# Patient Record
Sex: MALE | Race: WHITE | NOT HISPANIC OR LATINO | Employment: FULL TIME | URBAN - NONMETROPOLITAN AREA
[De-identification: names, ages, dates, MRNs, and addresses within clinical notes are randomized per-mention and may not be internally consistent; named-entity substitution may affect disease eponyms.]

---

## 2017-06-26 ENCOUNTER — OFFICE VISIT (OUTPATIENT)
Dept: FAMILY MEDICINE CLINIC | Facility: CLINIC | Age: 57
End: 2017-06-26

## 2017-06-26 VITALS
HEART RATE: 77 BPM | SYSTOLIC BLOOD PRESSURE: 122 MMHG | WEIGHT: 284 LBS | OXYGEN SATURATION: 98 % | BODY MASS INDEX: 38.47 KG/M2 | HEIGHT: 72 IN | DIASTOLIC BLOOD PRESSURE: 70 MMHG | TEMPERATURE: 98.2 F

## 2017-06-26 DIAGNOSIS — I10 ESSENTIAL HYPERTENSION: ICD-10-CM

## 2017-06-26 DIAGNOSIS — L03.031 CELLULITIS OF TOE, RIGHT: Primary | ICD-10-CM

## 2017-06-26 PROCEDURE — 99213 OFFICE O/P EST LOW 20 MIN: CPT | Performed by: NURSE PRACTITIONER

## 2017-06-26 RX ORDER — LOSARTAN POTASSIUM AND HYDROCHLOROTHIAZIDE 12.5; 1 MG/1; MG/1
1 TABLET ORAL DAILY
Qty: 30 TABLET | Refills: 5 | Status: SHIPPED | OUTPATIENT
Start: 2017-06-26 | End: 2017-09-13 | Stop reason: SDUPTHER

## 2017-06-26 RX ORDER — CEPHALEXIN 500 MG/1
500 CAPSULE ORAL 2 TIMES DAILY
Qty: 10 CAPSULE | Refills: 0 | Status: SHIPPED | OUTPATIENT
Start: 2017-06-26 | End: 2017-09-13

## 2017-06-26 RX ORDER — BIOTIN 1 MG
1000 TABLET ORAL DAILY
COMMUNITY
End: 2017-09-13

## 2017-06-26 RX ORDER — PREDNISONE 10 MG/1
10 TABLET ORAL DAILY
Qty: 7 TABLET | Refills: 0 | Status: SHIPPED | OUTPATIENT
Start: 2017-06-26 | End: 2017-09-13

## 2017-06-26 RX ORDER — FLUTICASONE PROPIONATE 50 MCG
2 SPRAY, SUSPENSION (ML) NASAL DAILY
Qty: 1 EACH | Refills: 0 | Status: SHIPPED | OUTPATIENT
Start: 2017-06-26 | End: 2017-07-26

## 2017-06-28 PROBLEM — L03.031 CELLULITIS OF TOE, RIGHT: Status: ACTIVE | Noted: 2017-06-28

## 2017-06-28 NOTE — PATIENT INSTRUCTIONS
Continue with heart healthy diet and exercise.  Will place on abx x 5 days as prophylaxis, he may also do epsom salt soaks to his Right foot twice daily prn.

## 2017-06-28 NOTE — PROGRESS NOTES
Subjective   Cy Blair is a 57 y.o. male who presents to the office for HTN follow-up.  Also has an area on his Right great toe that he wants assessed.  Had LifeScreening tests (abd aorta, carotid and BLE ultrasounds) performed in March that he states were normal.  History of Present Illness     The following portions of the patient's history were reviewed and updated as appropriate: allergies, current medications, past family history, past medical history, past social history, past surgical history and problem list.    Review of Systems   Constitutional: Negative for chills, fatigue and fever.   HENT: Negative for congestion, sneezing, sore throat and trouble swallowing.    Eyes: Negative for visual disturbance.   Respiratory: Negative for cough, chest tightness, shortness of breath and wheezing.    Cardiovascular: Negative for chest pain, palpitations and leg swelling.   Gastrointestinal: Negative for abdominal pain, constipation, diarrhea, nausea and vomiting.   Genitourinary: Negative for dysuria, frequency and urgency.   Musculoskeletal: Negative for neck pain.   Skin: Positive for wound. Negative for rash.   Neurological: Negative for dizziness, weakness and headaches.   Psychiatric/Behavioral:        In the past two weeks the pt has not felt down, depressed, hopeless or lost interest in doing things   All other systems reviewed and are negative.      Objective   Physical Exam   Constitutional: He is oriented to person, place, and time. He appears well-developed and well-nourished. He is cooperative. He does not have a sickly appearance. He does not appear ill.   HENT:   Head: Normocephalic and atraumatic.   Right Ear: External ear normal.   Left Ear: External ear normal.   Nose: Nose normal.   Mouth/Throat: Uvula is midline, oropharynx is clear and moist and mucous membranes are normal.   Eyes: Conjunctivae, EOM and lids are normal. Pupils are equal, round, and reactive to light. Right eye exhibits no  discharge. Left eye exhibits no discharge. No scleral icterus.   Neck: Trachea normal, normal range of motion and phonation normal. Neck supple. Carotid bruit is not present. No thyromegaly present.   Cardiovascular: Normal rate, regular rhythm, normal heart sounds and intact distal pulses.  Exam reveals no gallop and no friction rub.    No murmur heard.  Pulmonary/Chest: Effort normal and breath sounds normal. No respiratory distress. He has no wheezes. He has no rales.   Abdominal: Soft. Normal appearance and bowel sounds are normal. He exhibits no distension. There is no tenderness. There is no rebound and no guarding.   Musculoskeletal: Normal range of motion. He exhibits no edema.      Skin Integrity  -  Right foot skin intact: Right great toe with cellulitis, crosses over the nail. .  Lymphadenopathy:     He has no cervical adenopathy.   Neurological: He is alert and oriented to person, place, and time. No cranial nerve deficit. GCS eye subscore is 4. GCS verbal subscore is 5. GCS motor subscore is 6.   Skin: Skin is warm, dry and intact. No rash noted.   Psychiatric: He has a normal mood and affect. His behavior is normal. Judgment and thought content normal.   Nursing note and vitals reviewed.      Assessment/Plan   Cy was seen today for follow-up.    Diagnoses and all orders for this visit:    Cellulitis of toe, right    Essential hypertension    Other orders  -     losartan-hydrochlorothiazide (HYZAAR) 100-12.5 MG per tablet; Take 1 tablet by mouth Daily.  -     predniSONE (DELTASONE) 10 MG tablet; Take 1 tablet by mouth Daily.  -     fluticasone (FLONASE) 50 MCG/ACT nasal spray; 2 sprays into each nostril Daily for 30 days.  -     cephalexin (KEFLEX) 500 MG capsule; Take 1 capsule by mouth 2 (Two) Times a Day.

## 2017-09-13 ENCOUNTER — OFFICE VISIT (OUTPATIENT)
Dept: FAMILY MEDICINE CLINIC | Facility: CLINIC | Age: 57
End: 2017-09-13

## 2017-09-13 ENCOUNTER — LAB (OUTPATIENT)
Dept: LAB | Facility: OTHER | Age: 57
End: 2017-09-13

## 2017-09-13 VITALS
WEIGHT: 286 LBS | OXYGEN SATURATION: 99 % | HEART RATE: 85 BPM | TEMPERATURE: 98.2 F | DIASTOLIC BLOOD PRESSURE: 68 MMHG | BODY MASS INDEX: 38.74 KG/M2 | SYSTOLIC BLOOD PRESSURE: 136 MMHG | HEIGHT: 72 IN

## 2017-09-13 DIAGNOSIS — K59.09 OTHER CONSTIPATION: ICD-10-CM

## 2017-09-13 DIAGNOSIS — I10 ESSENTIAL HYPERTENSION: ICD-10-CM

## 2017-09-13 DIAGNOSIS — Z00.00 GENERAL MEDICAL EXAM: ICD-10-CM

## 2017-09-13 DIAGNOSIS — L60.0 INGROWN RIGHT BIG TOENAIL: Primary | ICD-10-CM

## 2017-09-13 DIAGNOSIS — Z12.5 PROSTATE CANCER SCREENING: ICD-10-CM

## 2017-09-13 DIAGNOSIS — L60.0 INGROWN RIGHT BIG TOENAIL: ICD-10-CM

## 2017-09-13 DIAGNOSIS — Z00.00 GENERAL MEDICAL EXAM: Primary | ICD-10-CM

## 2017-09-13 DIAGNOSIS — K21.9 GASTROESOPHAGEAL REFLUX DISEASE WITHOUT ESOPHAGITIS: ICD-10-CM

## 2017-09-13 LAB
ALBUMIN SERPL-MCNC: 4.5 G/DL (ref 3.2–5.5)
ALBUMIN/GLOB SERPL: 1.5 G/DL (ref 1–3)
ALP SERPL-CCNC: 54 U/L (ref 15–121)
ALT SERPL W P-5'-P-CCNC: 21 U/L (ref 10–60)
ANION GAP SERPL CALCULATED.3IONS-SCNC: 10 MMOL/L (ref 5–15)
AST SERPL-CCNC: 20 U/L (ref 10–60)
BASOPHILS # BLD AUTO: 0.03 10*3/MM3 (ref 0–0.2)
BASOPHILS NFR BLD AUTO: 0.6 % (ref 0–2)
BILIRUB SERPL-MCNC: 1 MG/DL (ref 0.2–1)
BUN BLD-MCNC: 18 MG/DL (ref 8–25)
BUN/CREAT SERPL: 18 (ref 7–25)
CALCIUM SPEC-SCNC: 9.7 MG/DL (ref 8.4–10.8)
CHLORIDE SERPL-SCNC: 102 MMOL/L (ref 100–112)
CHOLEST SERPL-MCNC: 150 MG/DL (ref 150–200)
CO2 SERPL-SCNC: 27 MMOL/L (ref 20–32)
CREAT BLD-MCNC: 1 MG/DL (ref 0.4–1.3)
DEPRECATED RDW RBC AUTO: 40.4 FL (ref 35.1–43.9)
EOSINOPHIL # BLD AUTO: 0.14 10*3/MM3 (ref 0–0.7)
EOSINOPHIL NFR BLD AUTO: 2.9 % (ref 0–7)
ERYTHROCYTE [DISTWIDTH] IN BLOOD BY AUTOMATED COUNT: 13 % (ref 11.5–14.5)
GFR SERPL CREATININE-BSD FRML MDRD: 76 ML/MIN/1.73 (ref 56–130)
GLOBULIN UR ELPH-MCNC: 3.1 GM/DL (ref 2.5–4.6)
GLUCOSE BLD-MCNC: 109 MG/DL (ref 70–100)
HBA1C MFR BLD: 5 % (ref 4–5.6)
HCT VFR BLD AUTO: 43.6 % (ref 39–49)
HDLC SERPL-MCNC: 39 MG/DL (ref 35–100)
HGB BLD-MCNC: 15.5 G/DL (ref 13.7–17.3)
LDLC SERPL CALC-MCNC: 82 MG/DL
LDLC/HDLC SERPL: 2.1 {RATIO}
LYMPHOCYTES # BLD AUTO: 1.41 10*3/MM3 (ref 0.6–4.2)
LYMPHOCYTES NFR BLD AUTO: 28.7 % (ref 10–50)
MCH RBC QN AUTO: 31.2 PG (ref 26.5–34)
MCHC RBC AUTO-ENTMCNC: 35.6 G/DL (ref 31.5–36.3)
MCV RBC AUTO: 87.7 FL (ref 80–98)
MONOCYTES # BLD AUTO: 0.46 10*3/MM3 (ref 0–0.9)
MONOCYTES NFR BLD AUTO: 9.4 % (ref 0–12)
NEUTROPHILS # BLD AUTO: 2.87 10*3/MM3 (ref 2–8.6)
NEUTROPHILS NFR BLD AUTO: 58.4 % (ref 37–80)
PLATELET # BLD AUTO: 216 10*3/MM3 (ref 150–450)
PMV BLD AUTO: 10.9 FL (ref 8–12)
POTASSIUM BLD-SCNC: 4.5 MMOL/L (ref 3.4–5.4)
PROT SERPL-MCNC: 7.6 G/DL (ref 6.7–8.2)
RBC # BLD AUTO: 4.97 10*6/MM3 (ref 4.37–5.74)
SODIUM BLD-SCNC: 139 MMOL/L (ref 134–146)
TRIGL SERPL-MCNC: 145 MG/DL (ref 35–160)
VLDLC SERPL-MCNC: 29 MG/DL
WBC NRBC COR # BLD: 4.91 10*3/MM3 (ref 3.2–9.8)

## 2017-09-13 PROCEDURE — 80061 LIPID PANEL: CPT | Performed by: NURSE PRACTITIONER

## 2017-09-13 PROCEDURE — 84443 ASSAY THYROID STIM HORMONE: CPT | Performed by: NURSE PRACTITIONER

## 2017-09-13 PROCEDURE — 80053 COMPREHEN METABOLIC PANEL: CPT | Performed by: NURSE PRACTITIONER

## 2017-09-13 PROCEDURE — 99213 OFFICE O/P EST LOW 20 MIN: CPT | Performed by: NURSE PRACTITIONER

## 2017-09-13 PROCEDURE — 36415 COLL VENOUS BLD VENIPUNCTURE: CPT | Performed by: NURSE PRACTITIONER

## 2017-09-13 PROCEDURE — 83036 HEMOGLOBIN GLYCOSYLATED A1C: CPT | Performed by: NURSE PRACTITIONER

## 2017-09-13 PROCEDURE — 84439 ASSAY OF FREE THYROXINE: CPT | Performed by: NURSE PRACTITIONER

## 2017-09-13 PROCEDURE — 84153 ASSAY OF PSA TOTAL: CPT | Performed by: NURSE PRACTITIONER

## 2017-09-13 PROCEDURE — 85025 COMPLETE CBC W/AUTO DIFF WBC: CPT | Performed by: NURSE PRACTITIONER

## 2017-09-13 RX ORDER — LOSARTAN POTASSIUM AND HYDROCHLOROTHIAZIDE 12.5; 1 MG/1; MG/1
1 TABLET ORAL DAILY
Qty: 90 TABLET | Refills: 1 | Status: SHIPPED | OUTPATIENT
Start: 2017-09-13 | End: 2017-12-20 | Stop reason: SDUPTHER

## 2017-09-13 RX ORDER — OMEPRAZOLE 20 MG/1
20 CAPSULE, DELAYED RELEASE ORAL DAILY
COMMUNITY
End: 2019-03-18

## 2017-09-13 NOTE — PROGRESS NOTES
Subjective   Cy Blair is a 57 y.o. male who presents to the office for HTN followup, is pleased with his blood pressure reading today.  Continues to have pain and drainage with his Right great toe.  Denies streaking but does have an odor.  Will obtain fasting labs today.  History of Present Illness     The following portions of the patient's history were reviewed and updated as appropriate: allergies, current medications, past family history, past medical history, past social history, past surgical history and problem list.    Review of Systems   Constitutional: Negative for chills, fatigue and fever.   HENT: Negative for congestion, sneezing, sore throat and trouble swallowing.    Eyes: Negative for visual disturbance.   Respiratory: Negative for cough, chest tightness, shortness of breath and wheezing.    Cardiovascular: Negative for chest pain, palpitations and leg swelling.   Gastrointestinal: Negative for abdominal pain, constipation, diarrhea, nausea and vomiting.   Genitourinary: Negative for dysuria, frequency and urgency.   Musculoskeletal: Negative for neck pain.   Skin: Positive for wound. Negative for rash.   Neurological: Negative for dizziness, weakness and headaches.   Psychiatric/Behavioral:        In the past two weeks the pt has not felt down, depressed, hopeless or lost interest in doing things   All other systems reviewed and are negative.      Objective   Physical Exam   Constitutional: He is oriented to person, place, and time. He appears well-developed and well-nourished. He is cooperative. He does not have a sickly appearance. He does not appear ill.   HENT:   Head: Normocephalic and atraumatic.   Right Ear: External ear normal.   Left Ear: External ear normal.   Nose: Nose normal.   Mouth/Throat: Uvula is midline, oropharynx is clear and moist and mucous membranes are normal.   Eyes: Conjunctivae, EOM and lids are normal. Pupils are equal, round, and reactive to light. Right eye exhibits  no discharge. Left eye exhibits no discharge. No scleral icterus.   Neck: Trachea normal, normal range of motion and phonation normal. Neck supple. No thyromegaly present.   Cardiovascular: Normal rate, regular rhythm, normal heart sounds and intact distal pulses.  Exam reveals no gallop and no friction rub.    No murmur heard.  Pulmonary/Chest: Effort normal and breath sounds normal. No respiratory distress. He has no wheezes. He has no rales.   Abdominal: Soft. Normal appearance and bowel sounds are normal. He exhibits no distension. There is no tenderness. There is no rebound and no guarding.   Musculoskeletal: Normal range of motion. He exhibits no edema.      Skin Integrity  -  He has right foot ingrown toenail (swelling with bloody exudate and pain he rates a 5)..  Lymphadenopathy:     He has no cervical adenopathy.   Neurological: He is alert and oriented to person, place, and time. No cranial nerve deficit. GCS eye subscore is 4. GCS verbal subscore is 5. GCS motor subscore is 6.   Skin: Skin is warm, dry and intact. No rash noted.   Psychiatric: He has a normal mood and affect. His behavior is normal. Judgment and thought content normal.   Nursing note and vitals reviewed.      Assessment/Plan   Cy was seen today for follow-up.    Diagnoses and all orders for this visit:    Ingrown right big toenail  -     Ambulatory Referral to Podiatry  -     Hemoglobin A1c; Future    Essential hypertension  -     Hemoglobin A1c; Future    Gastroesophageal reflux disease without esophagitis    Other constipation    Prostate cancer screening    Other orders  -     losartan-hydrochlorothiazide (HYZAAR) 100-12.5 MG per tablet; Take 1 tablet by mouth Daily.

## 2017-09-14 LAB
PSA SERPL-MCNC: 0.18 NG/ML (ref 0–4)
T4 FREE SERPL-MCNC: 1.28 NG/DL (ref 0.78–2.19)
TSH SERPL DL<=0.05 MIU/L-ACNC: 1.56 MIU/ML (ref 0.46–4.68)

## 2017-10-05 ENCOUNTER — OFFICE VISIT (OUTPATIENT)
Dept: PODIATRY | Facility: CLINIC | Age: 57
End: 2017-10-05

## 2017-10-05 VITALS — BODY MASS INDEX: 38.74 KG/M2 | WEIGHT: 286 LBS | HEIGHT: 72 IN

## 2017-10-05 DIAGNOSIS — L60.0 INGROWN TOENAIL: Primary | ICD-10-CM

## 2017-10-05 DIAGNOSIS — M79.674 PAIN OF TOE OF RIGHT FOOT: ICD-10-CM

## 2017-10-05 PROCEDURE — 99203 OFFICE O/P NEW LOW 30 MIN: CPT | Performed by: PODIATRIST

## 2017-10-05 RX ORDER — RANITIDINE 150 MG/1
150 TABLET ORAL 2 TIMES DAILY
COMMUNITY
End: 2018-04-25

## 2017-10-05 NOTE — PROGRESS NOTES
Cy Blair  1960  57 y.o. male   Patient presents today for possible right hallux ingrown.     10/05/2017  Chief Complaint   Patient presents with   • Right Foot - Ingrown Toenail           History of Present Illness    Cy Blair is a 57 y.o. male who presents for evaluation of a possible ingrown toenail on his right foot.  States he does not feel that this started out as an ingrown toenail but has irritation when he wore some ill fitting work shoes.  States that progressively noticed the inside of his nail become more painful and started bleeding.  He states he appears there is a skin fold growing over the edge of his nail.  This problem has been present for a couple of months and also recently he hasa similar problem began on the opposite side nail for the past 3 weeks.  He states he has tried trimming the nail with minimal improvement.  He denies any trauma or injuries.  Denies any previous history of ingrown toenails.        Past Medical History:   Diagnosis Date   • Candidiasis of skin     Candidiasis of skin - has flareups during the summer   • Essential (primary) hypertension          Past Surgical History:   Procedure Laterality Date   • KIDNEY STONE SURGERY     • NECK SURGERY      for herniated discs and spinal stenosis         Family History   Problem Relation Age of Onset   • Heart attack Mother 81     POSSIBLE MI   • Heart disease Father    • Sudden death Other    • Hypertension Brother    • Dementia Maternal Grandmother    • No Known Problems Maternal Grandfather    • No Known Problems Paternal Grandmother    • Clotting disorder Paternal Grandfather          Social History     Social History   • Marital status: Unknown     Spouse name: N/A   • Number of children: N/A   • Years of education: N/A     Occupational History   • Not on file.     Social History Main Topics   • Smoking status: Never Smoker   • Smokeless tobacco: Never Used   • Alcohol use No   • Drug use: No   • Sexual activity: Defer  "    Other Topics Concern   • Not on file     Social History Narrative         Current Outpatient Prescriptions   Medication Sig Dispense Refill   • aspirin 81 MG chewable tablet Chew 81 mg Daily.     • diphenhydrAMINE (BENADRYL) 25 mg capsule Take 25 mg by mouth Daily As Needed.     • fluticasone (FLONASE) 50 MCG/ACT nasal spray 2 sprays into each nostril Daily.     • losartan-hydrochlorothiazide (HYZAAR) 100-12.5 MG per tablet Take 1 tablet by mouth Daily. 90 tablet 1   • omeprazole (priLOSEC) 20 MG capsule Take 20 mg by mouth Daily.     • Yktjer-IeQbx-KdBkgu-FA-Omega (MULTIVITAMIN/MINERALS PO) Med Name: multivitamin & minerals #11-folic acid oral     • raNITIdine (ZANTAC) 150 MG tablet Take 150 mg by mouth 2 (Two) Times a Day.     • senna-docusate (SENOKOT S) 8.6-50 MG per tablet 1-2 tablets Daily.       No current facility-administered medications for this visit.          OBJECTIVE    Ht 72\" (182.9 cm)  Wt 286 lb (130 kg)  BMI 38.79 kg/m2      Review of Systems   Constitutional:  Denies recent weight loss, weight gain, fever or chills, no change in exercise tolerance  Musculoskeletal: Toe/foot pain.   Skin: No wounds or lesions  Neurological: Denies paresthesias.  Psychiatric/Behavioral: Denies depression        Physical Exam   Constitutional: he appears well-developed and well-nourished.   CV: No chest pain. Normal RR  Resp: Non labored respirations  Psychiatric: he has a normal mood and affect. her behavior is normal.      Lower Extremity Exam:  Vascular: DP/PT pulses palpable 2+.   right hallux edema  Toes warm  Neuro: Protective sensation intact, b/l.  DTRs intact  Integument: No open wounds.  Ingrown bilateral nail border, right hallux. Mild erythema, edema. +tenderness to palpation.  Web spaces c/d/i  No skin lesions  Musculoskeletal: LE muscle strength 5/5.   Gait normal  Ankle ROM full without pain or crepitus  STJ ROM full without pain or crepitus  No digital deformities      Nail Removal  Date/Time: " 10/11/2017 7:48 PM  Performed by: TERRIE ELLER  Authorized by: TERRIE ELLER   Consent: Written consent obtained.  Risks and benefits: risks, benefits and alternatives were discussed  Consent given by: patient  Location: right foot  Location details: right big toe  Anesthesia: digital block    Anesthesia:  Local Anesthetic: lidocaine 2% without epinephrine  Anesthetic total: 3 mL  Preparation: skin prepped with Betadine  Amount removed: partial  Nail removed location: bilateral borders.  Wedge excision of skin of nail fold: no  Nail bed sutured: no  Nail matrix removed: none  Removed nail replaced and anchored: no  Dressing: 4x4, antibiotic ointment and gauze roll  Patient tolerance: Patient tolerated the procedure well with no immediate complications                ASSESSMENT AND PLAN    Cy was seen today for ingrown toenail.    Diagnoses and all orders for this visit:    Ingrown toenail    Pain of toe of right foot    -Comprehensive foot and ankle exam performed  -Diagnosis, prevention, and treatment of ingrown toe nails discussed with patient, including risks and potential benefits of nail avulsion both temporary and permanent versus simple debridement.  -Pt elected for partial temporary  avulsion of right hallux  -Aftercare instructions for soapy ramos soaks BID  -Recheck 2 weeks          This document has been electronically signed by Terrie Eller DPM on October 11, 2017 7:48 PM     EMR Dragon/Transcription disclaimer:   Much of this encounter note is an electronic transcription/translation of spoken language to printed text. The electronic translation of spoken language may permit erroneous, or at times, nonsensical words or phrases to be inadvertently transcribed; Although I have reviewed the note for such errors, some may still exist.    Terrie Eller DPM  10/11/2017  7:48 PM

## 2017-10-11 PROCEDURE — 11730 AVULSION NAIL PLATE SIMPLE 1: CPT | Performed by: PODIATRIST

## 2017-10-19 ENCOUNTER — OFFICE VISIT (OUTPATIENT)
Dept: PODIATRY | Facility: CLINIC | Age: 57
End: 2017-10-19

## 2017-10-19 VITALS — BODY MASS INDEX: 38.74 KG/M2 | WEIGHT: 286 LBS | HEIGHT: 72 IN

## 2017-10-19 DIAGNOSIS — S91.209D NAIL AVULSION OF TOE, SUBSEQUENT ENCOUNTER: Primary | ICD-10-CM

## 2017-10-19 DIAGNOSIS — L60.0 INGROWN TOENAIL: ICD-10-CM

## 2017-10-19 PROCEDURE — 99212 OFFICE O/P EST SF 10 MIN: CPT | Performed by: PODIATRIST

## 2017-10-19 NOTE — PROGRESS NOTES
Cy Blair  1960  57 y.o. male   Patient presents today for right great toe follow-up.      Chief Complaint   Patient presents with   • Right Foot - Follow-up           History of Present Illness    Cy Blair is a 57 y.o. male who presents for Follow-up of right hallux total temporary nail avulsion.  He is doing well with a little bit of residual drainage and sensitivity at the very distal aspect of his toe.  Much improved overall.    Past Medical History:   Diagnosis Date   • Candidiasis of skin     Candidiasis of skin - has flareups during the summer   • Essential (primary) hypertension          Past Surgical History:   Procedure Laterality Date   • KIDNEY STONE SURGERY     • NECK SURGERY      for herniated discs and spinal stenosis         Family History   Problem Relation Age of Onset   • Heart attack Mother 81     POSSIBLE MI   • Heart disease Father    • Sudden death Other    • Hypertension Brother    • Dementia Maternal Grandmother    • No Known Problems Maternal Grandfather    • No Known Problems Paternal Grandmother    • Clotting disorder Paternal Grandfather          Social History     Social History   • Marital status: Unknown     Spouse name: N/A   • Number of children: N/A   • Years of education: N/A     Occupational History   • Not on file.     Social History Main Topics   • Smoking status: Never Smoker   • Smokeless tobacco: Never Used   • Alcohol use No   • Drug use: No   • Sexual activity: Defer     Other Topics Concern   • Not on file     Social History Narrative         Current Outpatient Prescriptions   Medication Sig Dispense Refill   • aspirin 81 MG chewable tablet Chew 81 mg Daily.     • diphenhydrAMINE (BENADRYL) 25 mg capsule Take 25 mg by mouth Daily As Needed.     • fluticasone (FLONASE) 50 MCG/ACT nasal spray 2 sprays into each nostril Daily.     • losartan-hydrochlorothiazide (HYZAAR) 100-12.5 MG per tablet Take 1 tablet by mouth Daily. 90 tablet 1   • omeprazole (priLOSEC) 20 MG  "capsule Take 20 mg by mouth Daily.     • Gurqnc-AmZve-InQypt-FA-Omega (MULTIVITAMIN/MINERALS PO) Med Name: multivitamin & minerals #11-folic acid oral     • raNITIdine (ZANTAC) 150 MG tablet Take 150 mg by mouth 2 (Two) Times a Day.     • senna-docusate (SENOKOT S) 8.6-50 MG per tablet 1-2 tablets Daily.       No current facility-administered medications for this visit.          OBJECTIVE    Ht 72\" (182.9 cm)  Wt 286 lb (130 kg)  BMI 38.79 kg/m2      Review of Systems   Constitutional:  Denies recent weight loss, weight gain, fever or chills, no change in exercise tolerance  Musculoskeletal: Toe/foot pain.   Skin: No wounds or lesions  Neurological: Denies paresthesias.  Psychiatric/Behavioral: Denies depression        Physical Exam   Constitutional: he appears well-developed and well-nourished.   CV: No chest pain. Normal RR  Resp: Non labored respirations  Psychiatric: he has a normal mood and affect. her behavior is normal.      Lower Extremity Exam:  Vascular: DP/PT pulses palpable 2+.   right hallux edema  Toes warm  Neuro: Protective sensation intact, b/l.  DTRs intact  Integument: No open wounds.  Right hallux nail bed clean dry and intact.  No signs of infection.  Web spaces c/d/i  No skin lesions  Musculoskeletal: LE muscle strength 5/5.   Gait normal  Ankle ROM full without pain or crepitus  STJ ROM full without pain or crepitus  No digital deformities      Procedures          ASSESSMENT AND PLAN    Cy was seen today for follow-up.    Diagnoses and all orders for this visit:    Nail avulsion of toe, subsequent encounter    Ingrown toenail    -Comprehensive foot and ankle exam performed  -Doing well.  May begin to wean soaks and bandaging over the next 2 weeks  -Recheck as needed          This document has been electronically signed by George Porras DPM on October 21, 2017 2:14 PM     EMR Dragon/Transcription disclaimer:   Much of this encounter note is an electronic transcription/translation of " spoken language to printed text. The electronic translation of spoken language may permit erroneous, or at times, nonsensical words or phrases to be inadvertently transcribed; Although I have reviewed the note for such errors, some may still exist.    George Porras DPM  10/21/2017  2:14 PM

## 2017-12-20 RX ORDER — LOSARTAN POTASSIUM AND HYDROCHLOROTHIAZIDE 12.5; 1 MG/1; MG/1
1 TABLET ORAL DAILY
Qty: 90 TABLET | Refills: 1 | Status: SHIPPED | OUTPATIENT
Start: 2017-12-20 | End: 2018-04-25 | Stop reason: SDUPTHER

## 2018-04-25 ENCOUNTER — OFFICE VISIT (OUTPATIENT)
Dept: FAMILY MEDICINE CLINIC | Facility: CLINIC | Age: 58
End: 2018-04-25

## 2018-04-25 VITALS
TEMPERATURE: 97.2 F | OXYGEN SATURATION: 98 % | HEART RATE: 75 BPM | WEIGHT: 276 LBS | BODY MASS INDEX: 37.38 KG/M2 | HEIGHT: 72 IN | DIASTOLIC BLOOD PRESSURE: 86 MMHG | SYSTOLIC BLOOD PRESSURE: 140 MMHG

## 2018-04-25 DIAGNOSIS — I10 ESSENTIAL HYPERTENSION: Primary | ICD-10-CM

## 2018-04-25 PROCEDURE — 99213 OFFICE O/P EST LOW 20 MIN: CPT | Performed by: NURSE PRACTITIONER

## 2018-04-25 RX ORDER — LOSARTAN POTASSIUM AND HYDROCHLOROTHIAZIDE 12.5; 1 MG/1; MG/1
1 TABLET ORAL DAILY
Qty: 90 TABLET | Refills: 1 | Status: SHIPPED | OUTPATIENT
Start: 2018-04-25 | End: 2019-03-15 | Stop reason: SDUPTHER

## 2018-04-26 NOTE — PROGRESS NOTES
Subjective   Cy Blair is a 57 y.o. male who presents to the office for lab follow-up for HTN.  Has had labs drawn by his work and wants to review, these will be scanned under today's visit.  BP was noted to be elevated at that time at 146/98.  Has an appt to see french Vern on Friday.    History of Present Illness     The following portions of the patient's history were reviewed and updated as appropriate: allergies, current medications, past family history, past medical history, past social history, past surgical history and problem list.    Review of Systems   Constitutional: Negative for chills, fatigue and fever.   HENT: Negative for congestion, sneezing, sore throat and trouble swallowing.    Eyes: Negative for visual disturbance.   Respiratory: Negative for cough, chest tightness, shortness of breath and wheezing.    Cardiovascular: Negative for chest pain, palpitations and leg swelling.   Gastrointestinal: Negative for abdominal pain, constipation, diarrhea, nausea and vomiting.   Genitourinary: Negative for dysuria, frequency and urgency.   Musculoskeletal: Negative for neck pain.   Skin: Negative for rash.   Neurological: Negative for dizziness, weakness and headaches.   Psychiatric/Behavioral:        In the past two weeks the pt has not felt down, depressed, hopeless or lost interest in doing things   All other systems reviewed and are negative.      Objective   Physical Exam   Constitutional: He is oriented to person, place, and time. He appears well-developed and well-nourished. He is cooperative.  Non-toxic appearance. He does not have a sickly appearance. He does not appear ill.   HENT:   Head: Normocephalic and atraumatic.   Right Ear: Tympanic membrane and external ear normal.   Left Ear: Tympanic membrane and external ear normal.   Nose: Nose normal.   Mouth/Throat: Uvula is midline, oropharynx is clear and moist and mucous membranes are normal.   Eyes: Conjunctivae, EOM and lids are normal.  Pupils are equal, round, and reactive to light. Right eye exhibits no discharge. Left eye exhibits no discharge. No scleral icterus.   Neck: Normal range of motion. Neck supple. No thyromegaly present.   Cardiovascular: Normal rate, normal heart sounds and intact distal pulses.  Exam reveals no gallop and no friction rub.    No murmur heard.  Pulmonary/Chest: Effort normal and breath sounds normal. No respiratory distress. He has no wheezes. He has no rales.   Abdominal: Soft. Bowel sounds are normal. He exhibits no distension. There is no tenderness. There is no rebound and no guarding.   Musculoskeletal: Normal range of motion. He exhibits no edema.   Lymphadenopathy:     He has no cervical adenopathy.   Neurological: He is alert and oriented to person, place, and time. No cranial nerve deficit. GCS eye subscore is 4. GCS verbal subscore is 5. GCS motor subscore is 6.   Skin: Skin is warm and dry. No rash noted.   Psychiatric: He has a normal mood and affect. His behavior is normal. Judgment and thought content normal.   Nursing note and vitals reviewed.      Assessment/Plan   Cy was seen today for follow-up.    Diagnoses and all orders for this visit:    Essential hypertension  -     Lipid Panel; Future  -     Hemoglobin A1c; Future  -     Basic Metabolic Panel    Other orders  -     lisinopril (PRINIVIL,ZESTRIL) 10 MG tablet; Take one-half tablet by mouth daily.    Encouraged meds as directed as well as healthy lifestyle.  Reviewed JANIYA# [id number].

## 2018-04-27 ENCOUNTER — OFFICE VISIT (OUTPATIENT)
Dept: PODIATRY | Facility: CLINIC | Age: 58
End: 2018-04-27

## 2018-04-27 VITALS — BODY MASS INDEX: 37.65 KG/M2 | HEIGHT: 72 IN | WEIGHT: 278 LBS

## 2018-04-27 DIAGNOSIS — S91.209D NAIL AVULSION OF TOE, SUBSEQUENT ENCOUNTER: Primary | ICD-10-CM

## 2018-04-27 DIAGNOSIS — L60.0 INGROWN TOENAIL: ICD-10-CM

## 2018-04-27 PROCEDURE — 11750 EXCISION NAIL&NAIL MATRIX: CPT | Performed by: PODIATRIST

## 2018-04-27 PROCEDURE — 99212 OFFICE O/P EST SF 10 MIN: CPT | Performed by: PODIATRIST

## 2018-04-27 RX ORDER — OMEGA-3S/DHA/EPA/FISH OIL/D3 300MG-1000
1000 CAPSULE ORAL DAILY
COMMUNITY

## 2018-04-30 RX ORDER — LISINOPRIL 10 MG/1
TABLET ORAL
Qty: 30 TABLET | Refills: 2 | Status: SHIPPED | OUTPATIENT
Start: 2018-04-30 | End: 2018-06-28 | Stop reason: DRUGHIGH

## 2018-05-17 ENCOUNTER — OFFICE VISIT (OUTPATIENT)
Dept: PODIATRY | Facility: CLINIC | Age: 58
End: 2018-05-17

## 2018-05-17 VITALS — WEIGHT: 278 LBS | HEIGHT: 72 IN | BODY MASS INDEX: 37.65 KG/M2

## 2018-05-17 DIAGNOSIS — L60.0 INGROWN RIGHT BIG TOENAIL: ICD-10-CM

## 2018-05-17 DIAGNOSIS — L03.031 CELLULITIS OF TOE, RIGHT: ICD-10-CM

## 2018-05-17 DIAGNOSIS — S91.209D NAIL AVULSION OF TOE, SUBSEQUENT ENCOUNTER: Primary | ICD-10-CM

## 2018-05-17 PROCEDURE — 11750 EXCISION NAIL&NAIL MATRIX: CPT | Performed by: PODIATRIST

## 2018-05-17 PROCEDURE — 99212 OFFICE O/P EST SF 10 MIN: CPT | Performed by: PODIATRIST

## 2018-06-04 ENCOUNTER — OFFICE VISIT (OUTPATIENT)
Dept: PODIATRY | Facility: CLINIC | Age: 58
End: 2018-06-04

## 2018-06-04 VITALS — WEIGHT: 277.78 LBS | BODY MASS INDEX: 37.62 KG/M2 | HEIGHT: 72 IN

## 2018-06-04 DIAGNOSIS — L03.031 ABSCESS OR CELLULITIS, TOE, RIGHT: ICD-10-CM

## 2018-06-04 DIAGNOSIS — S91.209D NAIL AVULSION OF TOE, SUBSEQUENT ENCOUNTER: Primary | ICD-10-CM

## 2018-06-04 DIAGNOSIS — L02.611 ABSCESS OR CELLULITIS, TOE, RIGHT: ICD-10-CM

## 2018-06-04 DIAGNOSIS — L60.0 INGROWN RIGHT BIG TOENAIL: ICD-10-CM

## 2018-06-04 PROCEDURE — 99213 OFFICE O/P EST LOW 20 MIN: CPT | Performed by: PODIATRIST

## 2018-06-04 RX ORDER — CEPHALEXIN 500 MG/1
500 CAPSULE ORAL 3 TIMES DAILY
Qty: 30 CAPSULE | Refills: 0 | Status: SHIPPED | OUTPATIENT
Start: 2018-06-04 | End: 2018-06-28

## 2018-06-04 NOTE — PROGRESS NOTES
Cy Blair  1960  58 y.o. male       Patient presents today for re-check right great toe.      Chief Complaint   Patient presents with   • Right Foot - Follow-up           History of Present Illness    Cy Blair is a 58 y.o. male who presents for Recheck of right hallux partial permanent nail avulsion.  He did have a partial permanent nail avulsion performed of the medial and lateral borders.  He states medial border since we doing very well there is still some discomfort to the lateral border.  There is also associated redness and drainage.    Past Medical History:   Diagnosis Date   • Candidiasis of skin     Candidiasis of skin - has flareups during the summer   • Essential (primary) hypertension          Past Surgical History:   Procedure Laterality Date   • KIDNEY STONE SURGERY     • NECK SURGERY      for herniated discs and spinal stenosis         Family History   Problem Relation Age of Onset   • Heart attack Mother 81        POSSIBLE MI   • Heart disease Father    • Sudden death Other    • Hypertension Brother    • Dementia Maternal Grandmother    • No Known Problems Maternal Grandfather    • No Known Problems Paternal Grandmother    • Clotting disorder Paternal Grandfather          Social History     Social History   • Marital status: Unknown     Spouse name: N/A   • Number of children: N/A   • Years of education: N/A     Occupational History   • Not on file.     Social History Main Topics   • Smoking status: Never Smoker   • Smokeless tobacco: Never Used   • Alcohol use No   • Drug use: No   • Sexual activity: Defer     Other Topics Concern   • Not on file     Social History Narrative   • No narrative on file         Current Outpatient Prescriptions   Medication Sig Dispense Refill   • aspirin 81 MG chewable tablet Chew 81 mg Daily.     • diphenhydrAMINE (BENADRYL) 25 mg capsule Take 25 mg by mouth Daily As Needed.     • fluticasone (FLONASE) 50 MCG/ACT nasal spray 2 sprays into each nostril Daily.   "   • lisinopril (PRINIVIL,ZESTRIL) 10 MG tablet Take one-half tablet by mouth daily. 30 tablet 2   • losartan-hydrochlorothiazide (HYZAAR) 100-12.5 MG per tablet Take 1 tablet by mouth Daily. 90 tablet 1   • Omega-3 Fatty Acids (FISH OIL BURP-LESS) 1200 MG capsule Take  by mouth.     • omeprazole (priLOSEC) 20 MG capsule Take 20 mg by mouth Daily.     • Stmpos-JzNmw-UqKqiy-FA-Omega (MULTIVITAMIN/MINERALS PO) Med Name: multivitamin & minerals #11-folic acid oral     • senna-docusate (SENOKOT S) 8.6-50 MG per tablet 1-2 tablets Daily.     • cephalexin (KEFLEX) 500 MG capsule Take 1 capsule by mouth 3 (Three) Times a Day. 30 capsule 0     No current facility-administered medications for this visit.          OBJECTIVE    Ht 182.9 cm (72\")   Wt 126 kg (277 lb 12.5 oz)   BMI 37.67 kg/m²       Review of Systems   Constitutional:  Denies recent weight loss, weight gain, fever or chills, no change in exercise tolerance  Musculoskeletal: Toe/foot pain.   Skin: No wounds or lesions  Neurological: Denies paresthesias.  Psychiatric/Behavioral: Denies depression        Physical Exam   Constitutional: he appears well-developed and well-nourished.   CV: No chest pain. Normal RR  Resp: Non labored respirations  Psychiatric: he has a normal mood and affect. her behavior is normal.      Lower Extremity Exam:  Vascular: DP/PT pulses palpable 2+.   Right hallux edema  Toes warm  Neuro: Protective sensation intact, b/l.  DTRs intact  Integument: No open wounds.  Medial and lateral nail borders of right hallux with mild debris.  There is surrounding erythema and mild serous drainage most notably to the lateral border.  Positive tenderness to palpation.  Web spaces c/d/i  No skin lesions  Musculoskeletal: LE muscle strength 5/5.   Gait normal  Ankle ROM full without pain or crepitus  STJ ROM full without pain or crepitus  No digital deformities        Procedures          ASSESSMENT AND PLAN    Cy was seen today for " follow-up.    Diagnoses and all orders for this visit:    Nail avulsion of toe, subsequent encounter    Ingrown right big toenail    Abscess or cellulitis, toe, right    Other orders  -     cephalexin (KEFLEX) 500 MG capsule; Take 1 capsule by mouth 3 (Three) Times a Day.      -Comprehensive foot and ankle exam performed  -Right hallux nail borders debrided with small curet.  We'll start on Keflex for persistent erythema.  -Continue soaks and bandaging  -Recheck 2 weeks            This document has been electronically signed by George Porras DPM on June 5, 2018 8:20 AM     EMR Dragon/Transcription disclaimer:   Much of this encounter note is an electronic transcription/translation of spoken language to printed text. The electronic translation of spoken language may permit erroneous, or at times, nonsensical words or phrases to be inadvertently transcribed; Although I have reviewed the note for such errors, some may still exist.    George Porras DPM  6/5/2018  8:20 AM

## 2018-06-05 ENCOUNTER — TELEPHONE (OUTPATIENT)
Dept: PODIATRY | Facility: CLINIC | Age: 58
End: 2018-06-05

## 2018-06-05 NOTE — TELEPHONE ENCOUNTER
Script was faxed and electronically sent in. I tried to call patient there was no answer. I left a voicemail.

## 2018-06-05 NOTE — TELEPHONE ENCOUNTER
DAPHNIE      SAYS HE WENT TO THE PHARMACY LAST NIGHT AND HIS SCRIPT WAS NOT CALLED IN.     CAN YOU PLEASE MAKE SURE IT IS CALLED IN?  PATIENT STATES HE WILL BE LEAVING TOWN TONIGHT.    THANKS.

## 2018-06-05 NOTE — TELEPHONE ENCOUNTER
LEFT  A VOICEMAIL THAT HE IS STILL LOOKING TO GET HIS ANTIBIOTICS CALLED IN.    HE STATED ON THE VOICEMAIL THAT HE HAS AN INFECTION AND HE NEEDS TO START THE MEDS ASAP.  HE STATED AGAIN HE IS GOING OUT OF TOWN.    PLEASE CALL THE SCRIPT IN TO THE PHARMACY.    THANKS.

## 2018-06-19 ENCOUNTER — OFFICE VISIT (OUTPATIENT)
Dept: PODIATRY | Facility: CLINIC | Age: 58
End: 2018-06-19

## 2018-06-19 VITALS — WEIGHT: 277.78 LBS | BODY MASS INDEX: 37.62 KG/M2 | HEIGHT: 72 IN

## 2018-06-19 DIAGNOSIS — S91.209D NAIL AVULSION OF TOE, SUBSEQUENT ENCOUNTER: Primary | ICD-10-CM

## 2018-06-19 DIAGNOSIS — L03.031 CELLULITIS OF TOE, RIGHT: ICD-10-CM

## 2018-06-19 PROCEDURE — 99212 OFFICE O/P EST SF 10 MIN: CPT | Performed by: PODIATRIST

## 2018-06-19 RX ORDER — LOSARTAN POTASSIUM AND HYDROCHLOROTHIAZIDE 12.5; 1 MG/1; MG/1
TABLET ORAL
Qty: 90 TABLET | Refills: 0 | Status: SHIPPED | OUTPATIENT
Start: 2018-06-19 | End: 2018-06-28 | Stop reason: SDUPTHER

## 2018-06-19 NOTE — PROGRESS NOTES
Cy Blair  1960  58 y.o. male       Patient presents today for re-check right great toe.      Chief Complaint   Patient presents with   • Right Foot - Follow-up           History of Present Illness    Cy Blair is a 58 y.o. male who presents for Recheck of right hallux partial permanent nail avulsion.  Redness and tenderness improved.    Past Medical History:   Diagnosis Date   • Candidiasis of skin     Candidiasis of skin - has flareups during the summer   • Essential (primary) hypertension          Past Surgical History:   Procedure Laterality Date   • KIDNEY STONE SURGERY     • NECK SURGERY      for herniated discs and spinal stenosis         Family History   Problem Relation Age of Onset   • Heart attack Mother 81        POSSIBLE MI   • Heart disease Father    • Sudden death Other    • Hypertension Brother    • Dementia Maternal Grandmother    • No Known Problems Maternal Grandfather    • No Known Problems Paternal Grandmother    • Clotting disorder Paternal Grandfather          Social History     Social History   • Marital status: Unknown     Spouse name: N/A   • Number of children: N/A   • Years of education: N/A     Occupational History   • Not on file.     Social History Main Topics   • Smoking status: Never Smoker   • Smokeless tobacco: Never Used   • Alcohol use No   • Drug use: No   • Sexual activity: Defer     Other Topics Concern   • Not on file     Social History Narrative   • No narrative on file         Current Outpatient Prescriptions   Medication Sig Dispense Refill   • aspirin 81 MG chewable tablet Chew 81 mg Daily.     • diphenhydrAMINE (BENADRYL) 25 mg capsule Take 25 mg by mouth Daily As Needed.     • fluticasone (FLONASE) 50 MCG/ACT nasal spray 2 sprays into each nostril Daily.     • lisinopril (PRINIVIL,ZESTRIL) 10 MG tablet Take one-half tablet by mouth daily. 30 tablet 2   • losartan-hydrochlorothiazide (HYZAAR) 100-12.5 MG per tablet Take 1 tablet by mouth Daily. 90 tablet 1   •  "losartan-hydrochlorothiazide (HYZAAR) 100-12.5 MG per tablet TAKE 1 TABLET DAILY 90 tablet 0   • Omega-3 Fatty Acids (FISH OIL BURP-LESS) 1200 MG capsule Take  by mouth.     • omeprazole (priLOSEC) 20 MG capsule Take 20 mg by mouth Daily.     • Rqstiw-JeTzp-XtZday-FA-Omega (MULTIVITAMIN/MINERALS PO) Med Name: multivitamin & minerals #11-folic acid oral     • senna-docusate (SENOKOT S) 8.6-50 MG per tablet 1-2 tablets Daily.     • cephalexin (KEFLEX) 500 MG capsule Take 1 capsule by mouth 3 (Three) Times a Day. 30 capsule 0     No current facility-administered medications for this visit.          OBJECTIVE    Ht 182.9 cm (72\")   Wt 126 kg (277 lb 12.5 oz)   BMI 37.67 kg/m²       Review of Systems   Constitutional:  Denies recent weight loss, weight gain, fever or chills, no change in exercise tolerance  Musculoskeletal: Toe/foot pain.   Skin: No wounds or lesions  Neurological: Denies paresthesias.  Psychiatric/Behavioral: Denies depression        Physical Exam   Constitutional: he appears well-developed and well-nourished.   CV: No chest pain. Normal RR  Resp: Non labored respirations  Psychiatric: he has a normal mood and affect. her behavior is normal.      Lower Extremity Exam:  Vascular: DP/PT pulses palpable 2+.   Right hallux edema  Toes warm  Neuro: Protective sensation intact, b/l.  DTRs intact  Integument: No open wounds.  Medial and lateral nail borders of right hallux c/d/i. Erythema improved  Minimal Positive tenderness to palpation.  Web spaces c/d/i  No skin lesions  Musculoskeletal: LE muscle strength 5/5.   Gait normal  Ankle ROM full without pain or crepitus  STJ ROM full without pain or crepitus  No digital deformities        Procedures          ASSESSMENT AND PLAN    Cy was seen today for follow-up.    Diagnoses and all orders for this visit:    Nail avulsion of toe, subsequent encounter    Cellulitis of toe, right      -Comprehensive foot and ankle exam performed  -Completed Keflex " course  -Wean soaks, bandaging  -Recheck as needed            This document has been electronically signed by George Porras DPM on June 20, 2018 6:07 PM     EMR Dragon/Transcription disclaimer:   Much of this encounter note is an electronic transcription/translation of spoken language to printed text. The electronic translation of spoken language may permit erroneous, or at times, nonsensical words or phrases to be inadvertently transcribed; Although I have reviewed the note for such errors, some may still exist.    George Porras DPM  6/20/2018  6:07 PM

## 2018-06-28 ENCOUNTER — LAB (OUTPATIENT)
Dept: LAB | Facility: OTHER | Age: 58
End: 2018-06-28

## 2018-06-28 ENCOUNTER — OFFICE VISIT (OUTPATIENT)
Dept: FAMILY MEDICINE CLINIC | Facility: CLINIC | Age: 58
End: 2018-06-28

## 2018-06-28 VITALS
OXYGEN SATURATION: 99 % | BODY MASS INDEX: 37.36 KG/M2 | DIASTOLIC BLOOD PRESSURE: 70 MMHG | HEART RATE: 68 BPM | HEIGHT: 72 IN | WEIGHT: 275.8 LBS | SYSTOLIC BLOOD PRESSURE: 142 MMHG | TEMPERATURE: 98.3 F

## 2018-06-28 DIAGNOSIS — I10 ESSENTIAL HYPERTENSION: ICD-10-CM

## 2018-06-28 DIAGNOSIS — K21.9 GASTROESOPHAGEAL REFLUX DISEASE WITHOUT ESOPHAGITIS: Primary | ICD-10-CM

## 2018-06-28 LAB
ANION GAP SERPL CALCULATED.3IONS-SCNC: 11 MMOL/L (ref 5–15)
BUN BLD-MCNC: 17 MG/DL (ref 9–20)
BUN/CREAT SERPL: 17.2 (ref 7–25)
CALCIUM SPEC-SCNC: 9.7 MG/DL (ref 8.4–10.2)
CHLORIDE SERPL-SCNC: 103 MMOL/L (ref 98–107)
CHOLEST SERPL-MCNC: 138 MG/DL (ref 150–200)
CO2 SERPL-SCNC: 27 MMOL/L (ref 22–30)
CREAT BLD-MCNC: 0.99 MG/DL (ref 0.66–1.25)
GFR SERPL CREATININE-BSD FRML MDRD: 77 ML/MIN/1.73 (ref 49–113)
GLUCOSE BLD-MCNC: 102 MG/DL (ref 74–99)
HBA1C MFR BLD: 5.2 % (ref 4–5.6)
HDLC SERPL-MCNC: 39 MG/DL (ref 40–59)
LDLC SERPL CALC-MCNC: 77 MG/DL
LDLC/HDLC SERPL: 1.97 {RATIO} (ref 0–3.55)
POTASSIUM BLD-SCNC: 4.4 MMOL/L (ref 3.4–5)
SODIUM BLD-SCNC: 141 MMOL/L (ref 137–145)
TRIGL SERPL-MCNC: 110 MG/DL
VLDLC SERPL-MCNC: 22 MG/DL

## 2018-06-28 PROCEDURE — 36415 COLL VENOUS BLD VENIPUNCTURE: CPT | Performed by: NURSE PRACTITIONER

## 2018-06-28 PROCEDURE — 80048 BASIC METABOLIC PNL TOTAL CA: CPT | Performed by: NURSE PRACTITIONER

## 2018-06-28 PROCEDURE — 83036 HEMOGLOBIN GLYCOSYLATED A1C: CPT | Performed by: NURSE PRACTITIONER

## 2018-06-28 PROCEDURE — 80061 LIPID PANEL: CPT | Performed by: NURSE PRACTITIONER

## 2018-06-28 PROCEDURE — 99213 OFFICE O/P EST LOW 20 MIN: CPT | Performed by: NURSE PRACTITIONER

## 2018-06-28 RX ORDER — LISINOPRIL 20 MG/1
20 TABLET ORAL DAILY
Qty: 30 TABLET | Refills: 5 | Status: SHIPPED | OUTPATIENT
Start: 2018-06-28 | End: 2018-11-07 | Stop reason: DRUGHIGH

## 2018-06-28 NOTE — PROGRESS NOTES
Subjective   Cy Blair is a 58 y.o. male who presents to the office for HTN follow-up.  Has noticed BP remains elevated and has actually been taking Lisinopril 10 mg to see if that would help.    History of Present Illness     The following portions of the patient's history were reviewed and updated as appropriate: allergies, current medications, past family history, past medical history, past social history, past surgical history and problem list.    Review of Systems   Constitutional: Negative for chills, fatigue and fever.   HENT: Negative for congestion, sneezing, sore throat and trouble swallowing.    Eyes: Negative for visual disturbance.   Respiratory: Negative for cough, chest tightness, shortness of breath and wheezing.    Cardiovascular: Negative for chest pain, palpitations and leg swelling.   Gastrointestinal: Negative for abdominal pain, constipation, diarrhea, nausea and vomiting.   Genitourinary: Negative for dysuria, frequency and urgency.   Musculoskeletal: Negative for neck pain.   Skin: Negative for rash.   Neurological: Negative for dizziness, weakness and headaches.   Psychiatric/Behavioral:        In the past two weeks the pt has not felt down, depressed, hopeless or lost interest in doing things   All other systems reviewed and are negative.      Objective   Physical Exam   Constitutional: He is oriented to person, place, and time. He appears well-developed and well-nourished. He is cooperative.  Non-toxic appearance. He does not have a sickly appearance. He does not appear ill.   HENT:   Head: Normocephalic and atraumatic.   Right Ear: External ear normal.   Left Ear: External ear normal.   Nose: Nose normal.   Mouth/Throat: Uvula is midline, oropharynx is clear and moist and mucous membranes are normal.   Eyes: Conjunctivae, EOM and lids are normal. Pupils are equal, round, and reactive to light. Right eye exhibits no discharge. Left eye exhibits no discharge. No scleral icterus.   Neck:  Trachea normal, normal range of motion and phonation normal. Neck supple. No thyromegaly present.   Cardiovascular: Normal rate, regular rhythm, normal heart sounds and intact distal pulses.  Exam reveals no gallop and no friction rub.    No murmur heard.  Pulmonary/Chest: Effort normal and breath sounds normal. No respiratory distress. He has no wheezes. He has no rales.   Abdominal: Soft. Normal appearance and bowel sounds are normal. He exhibits no distension. There is no tenderness. There is no rebound and no guarding.   Musculoskeletal: Normal range of motion. He exhibits no edema.     Vascular Status -  His right foot exhibits no edema. His left foot exhibits no edema.  Lymphadenopathy:     He has no cervical adenopathy.   Neurological: He is alert and oriented to person, place, and time. No cranial nerve deficit. GCS eye subscore is 4. GCS verbal subscore is 5. GCS motor subscore is 6.   Skin: Skin is warm and dry. No rash noted.   Psychiatric: He has a normal mood and affect. His speech is normal and behavior is normal. Judgment and thought content normal. Cognition and memory are normal.   Nursing note and vitals reviewed.      Assessment/Plan   Cy was seen today for follow-up.    Diagnoses and all orders for this visit:    Gastroesophageal reflux disease without esophagitis    Essential hypertension    Other orders  -     lisinopril (PRINIVIL,ZESTRIL) 20 MG tablet; Take 1 tablet by mouth Daily.    Encouraged meds as directed.  Will trial dual therapy with his HTN agents.

## 2018-07-02 ENCOUNTER — TELEPHONE (OUTPATIENT)
Dept: FAMILY MEDICINE CLINIC | Facility: CLINIC | Age: 58
End: 2018-07-02

## 2018-09-14 RX ORDER — LOSARTAN POTASSIUM AND HYDROCHLOROTHIAZIDE 12.5; 1 MG/1; MG/1
TABLET ORAL
Qty: 90 TABLET | Refills: 1 | Status: SHIPPED | OUTPATIENT
Start: 2018-09-14 | End: 2018-10-31 | Stop reason: SDUPTHER

## 2018-10-31 ENCOUNTER — OFFICE VISIT (OUTPATIENT)
Dept: FAMILY MEDICINE CLINIC | Facility: CLINIC | Age: 58
End: 2018-10-31

## 2018-10-31 VITALS
TEMPERATURE: 97.8 F | SYSTOLIC BLOOD PRESSURE: 162 MMHG | WEIGHT: 278 LBS | BODY MASS INDEX: 37.65 KG/M2 | HEIGHT: 72 IN | OXYGEN SATURATION: 98 % | DIASTOLIC BLOOD PRESSURE: 94 MMHG | HEART RATE: 70 BPM

## 2018-10-31 DIAGNOSIS — I10 ESSENTIAL HYPERTENSION: Primary | ICD-10-CM

## 2018-10-31 DIAGNOSIS — I16.1 HYPERTENSIVE EMERGENCY: ICD-10-CM

## 2018-10-31 PROCEDURE — 99213 OFFICE O/P EST LOW 20 MIN: CPT | Performed by: NURSE PRACTITIONER

## 2018-10-31 RX ORDER — CLONIDINE HYDROCHLORIDE 0.1 MG/1
0.2 TABLET ORAL EVERY 12 HOURS SCHEDULED
Status: DISCONTINUED | OUTPATIENT
Start: 2018-10-31 | End: 2019-03-18

## 2018-11-07 DIAGNOSIS — I10 ESSENTIAL HYPERTENSION: Primary | ICD-10-CM

## 2018-11-07 RX ORDER — LISINOPRIL 40 MG/1
40 TABLET ORAL DAILY
Qty: 30 TABLET | Refills: 5 | Status: SHIPPED | OUTPATIENT
Start: 2018-11-07 | End: 2019-03-18 | Stop reason: SDUPTHER

## 2018-11-15 PROBLEM — I16.1 HYPERTENSIVE EMERGENCY: Status: ACTIVE | Noted: 2018-11-15

## 2018-11-16 NOTE — PROGRESS NOTES
Subjective   Cy Blair is a 58 y.o. male who presents to the office for HTN follow-up.  Has been without his Lisinopril for forth-eight hours.  Denies new headache complaints or vision changes.  History of Present Illness     The following portions of the patient's history were reviewed and updated as appropriate: allergies, current medications, past family history, past medical history, past social history, past surgical history and problem list.    Review of Systems   Constitutional: Negative for chills, fatigue and fever.   HENT: Negative for congestion, sneezing, sore throat and trouble swallowing.    Eyes: Negative for visual disturbance.   Respiratory: Negative for cough, chest tightness, shortness of breath and wheezing.    Cardiovascular: Negative for chest pain, palpitations and leg swelling.   Gastrointestinal: Negative for abdominal pain, constipation, diarrhea, nausea and vomiting.   Genitourinary: Negative for dysuria, frequency and urgency.   Musculoskeletal: Negative for neck pain.   Skin: Negative for rash.   Neurological: Negative for dizziness, weakness and headaches.   Psychiatric/Behavioral:        In the past two weeks the pt has not felt down, depressed, hopeless or lost interest in doing things   All other systems reviewed and are negative.      Objective   Physical Exam   Constitutional: He is oriented to person, place, and time. He appears well-developed and well-nourished. He is cooperative.  Non-toxic appearance. He does not have a sickly appearance. He does not appear ill.   HENT:   Head: Normocephalic and atraumatic.   Right Ear: External ear normal.   Left Ear: External ear normal.   Nose: Nose normal.   Mouth/Throat: Uvula is midline, oropharynx is clear and moist and mucous membranes are normal.   Eyes: Conjunctivae, EOM and lids are normal. Pupils are equal, round, and reactive to light. Right eye exhibits no discharge. Left eye exhibits no discharge. No scleral icterus.   Neck:  Trachea normal, normal range of motion and phonation normal. Neck supple. No thyromegaly present.   Cardiovascular: Normal rate, regular rhythm, normal heart sounds and intact distal pulses. Exam reveals no gallop and no friction rub.   No murmur heard.  Patient received two Clonidine at 3:39 pm and experienced no adverse side effects   Pulmonary/Chest: Effort normal and breath sounds normal. No respiratory distress. He has no wheezes. He has no rales.   Abdominal: Soft. Normal appearance and bowel sounds are normal. He exhibits no distension. There is no tenderness. There is no rebound and no guarding.   Musculoskeletal: Normal range of motion. He exhibits no edema.   Lymphadenopathy:     He has no cervical adenopathy.   Neurological: He is alert and oriented to person, place, and time. No cranial nerve deficit. GCS eye subscore is 4. GCS verbal subscore is 5. GCS motor subscore is 6.   Skin: Skin is warm, dry and intact. Capillary refill takes less than 2 seconds. No rash noted.   Psychiatric: He has a normal mood and affect. His behavior is normal. Judgment and thought content normal.   Nursing note and vitals reviewed.      Assessment/Plan   Cy was seen today for hypertension.    Diagnoses and all orders for this visit:    Essential hypertension  -     CloNIDine (CATAPRES) tablet 0.2 mg; Take 2 tablets by mouth Every 12 (Twelve) Hours.

## 2019-03-15 RX ORDER — LOSARTAN POTASSIUM AND HYDROCHLOROTHIAZIDE 12.5; 1 MG/1; MG/1
TABLET ORAL
Qty: 90 TABLET | Refills: 1 | Status: SHIPPED | OUTPATIENT
Start: 2019-03-15 | End: 2019-09-01 | Stop reason: SDUPTHER

## 2019-03-18 DIAGNOSIS — I10 ESSENTIAL HYPERTENSION: ICD-10-CM

## 2019-03-18 RX ORDER — LISINOPRIL 40 MG/1
40 TABLET ORAL DAILY
Qty: 30 TABLET | Refills: 5 | Status: SHIPPED | OUTPATIENT
Start: 2019-03-18 | End: 2019-03-20 | Stop reason: SDUPTHER

## 2019-03-20 DIAGNOSIS — I10 ESSENTIAL HYPERTENSION: ICD-10-CM

## 2019-03-20 RX ORDER — LISINOPRIL 40 MG/1
40 TABLET ORAL DAILY
Qty: 90 TABLET | Refills: 1 | Status: SHIPPED | OUTPATIENT
Start: 2019-03-20 | End: 2019-09-24 | Stop reason: SDUPTHER

## 2019-09-03 RX ORDER — LOSARTAN POTASSIUM AND HYDROCHLOROTHIAZIDE 12.5; 1 MG/1; MG/1
TABLET ORAL
Qty: 90 TABLET | Refills: 1 | Status: SHIPPED | OUTPATIENT
Start: 2019-09-03 | End: 2020-02-27

## 2019-09-24 DIAGNOSIS — I10 ESSENTIAL HYPERTENSION: ICD-10-CM

## 2019-09-24 RX ORDER — LISINOPRIL 40 MG/1
TABLET ORAL
Qty: 90 TABLET | Refills: 1 | Status: SHIPPED | OUTPATIENT
Start: 2019-09-24 | End: 2020-03-23

## 2019-12-20 ENCOUNTER — LAB (OUTPATIENT)
Dept: LAB | Facility: OTHER | Age: 59
End: 2019-12-20

## 2019-12-20 ENCOUNTER — OFFICE VISIT (OUTPATIENT)
Dept: FAMILY MEDICINE CLINIC | Facility: CLINIC | Age: 59
End: 2019-12-20

## 2019-12-20 VITALS
HEART RATE: 76 BPM | HEIGHT: 72 IN | BODY MASS INDEX: 37.11 KG/M2 | WEIGHT: 274 LBS | OXYGEN SATURATION: 98 % | DIASTOLIC BLOOD PRESSURE: 68 MMHG | SYSTOLIC BLOOD PRESSURE: 124 MMHG | TEMPERATURE: 98.6 F

## 2019-12-20 DIAGNOSIS — I10 ESSENTIAL HYPERTENSION: Primary | ICD-10-CM

## 2019-12-20 DIAGNOSIS — Z12.5 PROSTATE CANCER SCREENING: ICD-10-CM

## 2019-12-20 DIAGNOSIS — I10 ESSENTIAL HYPERTENSION: ICD-10-CM

## 2019-12-20 DIAGNOSIS — K21.9 GASTROESOPHAGEAL REFLUX DISEASE WITHOUT ESOPHAGITIS: ICD-10-CM

## 2019-12-20 DIAGNOSIS — Z82.49 FAMILY HISTORY OF MI (MYOCARDIAL INFARCTION): ICD-10-CM

## 2019-12-20 DIAGNOSIS — R07.9 CHEST PAIN, UNSPECIFIED TYPE: ICD-10-CM

## 2019-12-20 DIAGNOSIS — G47.19 EXCESSIVE DAYTIME SLEEPINESS: ICD-10-CM

## 2019-12-20 LAB
ALBUMIN SERPL-MCNC: 4.9 G/DL (ref 3.5–5)
ALBUMIN/GLOB SERPL: 1.4 G/DL (ref 1.1–1.8)
ALP SERPL-CCNC: 79 U/L (ref 38–126)
ALT SERPL W P-5'-P-CCNC: 19 U/L
ANION GAP SERPL CALCULATED.3IONS-SCNC: 9 MMOL/L (ref 5–15)
AST SERPL-CCNC: 23 U/L (ref 17–59)
BASOPHILS # BLD AUTO: 0.03 10*3/MM3 (ref 0–0.2)
BASOPHILS NFR BLD AUTO: 0.4 % (ref 0–1.5)
BILIRUB SERPL-MCNC: 1.2 MG/DL (ref 0.2–1.3)
BILIRUB UR QL STRIP: NEGATIVE
BUN BLD-MCNC: 15 MG/DL (ref 7–23)
BUN/CREAT SERPL: 13.6 (ref 7–25)
CALCIUM SPEC-SCNC: 10.2 MG/DL (ref 8.4–10.2)
CHLORIDE SERPL-SCNC: 101 MMOL/L (ref 101–112)
CHOLEST SERPL-MCNC: 150 MG/DL (ref 150–200)
CLARITY UR: ABNORMAL
CO2 SERPL-SCNC: 30 MMOL/L (ref 22–30)
COLOR UR: ABNORMAL
CREAT BLD-MCNC: 1.1 MG/DL (ref 0.7–1.3)
DEPRECATED RDW RBC AUTO: 39.8 FL (ref 37–54)
EOSINOPHIL # BLD AUTO: 0.08 10*3/MM3 (ref 0–0.4)
EOSINOPHIL NFR BLD AUTO: 1.1 % (ref 0.3–6.2)
ERYTHROCYTE [DISTWIDTH] IN BLOOD BY AUTOMATED COUNT: 12.8 % (ref 12.3–15.4)
GFR SERPL CREATININE-BSD FRML MDRD: 68 ML/MIN/1.73 (ref 49–113)
GLOBULIN UR ELPH-MCNC: 3.4 GM/DL (ref 2.3–3.5)
GLUCOSE BLD-MCNC: 98 MG/DL (ref 70–99)
GLUCOSE UR STRIP-MCNC: NEGATIVE MG/DL
HCT VFR BLD AUTO: 44.2 % (ref 37.5–51)
HDLC SERPL-MCNC: 42 MG/DL (ref 40–59)
HGB BLD-MCNC: 16 G/DL (ref 13–17.7)
HGB UR QL STRIP.AUTO: NEGATIVE
KETONES UR QL STRIP: ABNORMAL
LDLC SERPL CALC-MCNC: 89 MG/DL
LDLC/HDLC SERPL: 2.11 {RATIO} (ref 0–3.55)
LEUKOCYTE ESTERASE UR QL STRIP.AUTO: NEGATIVE
LYMPHOCYTES # BLD AUTO: 1.84 10*3/MM3 (ref 0.7–3.1)
LYMPHOCYTES NFR BLD AUTO: 25.7 % (ref 19.6–45.3)
MCH RBC QN AUTO: 30.8 PG (ref 26.6–33)
MCHC RBC AUTO-ENTMCNC: 36.2 G/DL (ref 31.5–35.7)
MCV RBC AUTO: 85.2 FL (ref 79–97)
MONOCYTES # BLD AUTO: 0.61 10*3/MM3 (ref 0.1–0.9)
MONOCYTES NFR BLD AUTO: 8.5 % (ref 5–12)
NEUTROPHILS # BLD AUTO: 4.6 10*3/MM3 (ref 1.7–7)
NEUTROPHILS NFR BLD AUTO: 64.3 % (ref 42.7–76)
NITRITE UR QL STRIP: NEGATIVE
PH UR STRIP.AUTO: 6 [PH] (ref 5.5–8)
PLATELET # BLD AUTO: 226 10*3/MM3 (ref 140–450)
PMV BLD AUTO: 10.5 FL (ref 6–12)
POTASSIUM BLD-SCNC: 3.8 MMOL/L (ref 3.4–5)
PROT SERPL-MCNC: 8.3 G/DL (ref 6.3–8.6)
PROT UR QL STRIP: NEGATIVE
RBC # BLD AUTO: 5.19 10*6/MM3 (ref 4.14–5.8)
SODIUM BLD-SCNC: 140 MMOL/L (ref 137–145)
SP GR UR STRIP: 1.01 (ref 1–1.03)
TRIGL SERPL-MCNC: 97 MG/DL
UROBILINOGEN UR QL STRIP: ABNORMAL
VLDLC SERPL-MCNC: 19.4 MG/DL
WBC NRBC COR # BLD: 7.16 10*3/MM3 (ref 3.4–10.8)

## 2019-12-20 PROCEDURE — 84443 ASSAY THYROID STIM HORMONE: CPT | Performed by: NURSE PRACTITIONER

## 2019-12-20 PROCEDURE — 84439 ASSAY OF FREE THYROXINE: CPT | Performed by: NURSE PRACTITIONER

## 2019-12-20 PROCEDURE — 82607 VITAMIN B-12: CPT | Performed by: NURSE PRACTITIONER

## 2019-12-20 PROCEDURE — 80053 COMPREHEN METABOLIC PANEL: CPT | Performed by: NURSE PRACTITIONER

## 2019-12-20 PROCEDURE — 81003 URINALYSIS AUTO W/O SCOPE: CPT | Performed by: NURSE PRACTITIONER

## 2019-12-20 PROCEDURE — 99214 OFFICE O/P EST MOD 30 MIN: CPT | Performed by: NURSE PRACTITIONER

## 2019-12-20 PROCEDURE — 80061 LIPID PANEL: CPT | Performed by: NURSE PRACTITIONER

## 2019-12-20 PROCEDURE — 85025 COMPLETE CBC W/AUTO DIFF WBC: CPT | Performed by: NURSE PRACTITIONER

## 2019-12-20 PROCEDURE — 36415 COLL VENOUS BLD VENIPUNCTURE: CPT | Performed by: NURSE PRACTITIONER

## 2019-12-20 PROCEDURE — G0103 PSA SCREENING: HCPCS | Performed by: NURSE PRACTITIONER

## 2019-12-20 PROCEDURE — 82306 VITAMIN D 25 HYDROXY: CPT | Performed by: NURSE PRACTITIONER

## 2019-12-20 PROCEDURE — 93000 ELECTROCARDIOGRAM COMPLETE: CPT | Performed by: NURSE PRACTITIONER

## 2019-12-20 PROCEDURE — 83036 HEMOGLOBIN GLYCOSYLATED A1C: CPT | Performed by: NURSE PRACTITIONER

## 2019-12-21 LAB
25(OH)D3 SERPL-MCNC: 38.1 NG/ML (ref 30–100)
HBA1C MFR BLD: 5.29 % (ref 4.8–5.6)
PSA SERPL-MCNC: 0.18 NG/ML (ref 0–4)
T4 FREE SERPL-MCNC: 1.52 NG/DL (ref 0.93–1.7)
TSH SERPL DL<=0.05 MIU/L-ACNC: 1.76 UIU/ML (ref 0.27–4.2)
VIT B12 BLD-MCNC: 952 PG/ML (ref 211–946)

## 2020-01-06 PROBLEM — Z82.49 FAMILY HISTORY OF MI (MYOCARDIAL INFARCTION): Status: ACTIVE | Noted: 2020-01-06

## 2020-01-06 NOTE — PROGRESS NOTES
"Subjective   Cy Blair is a 59 y.o. male who presents to the office complaining of an episode that occurred at his work last Saturday.  He works third shift so it occurred at midnight.  During that episode he felt like \"if I go out I may not wake up.\"  Denied pain or that the room was spinning.  Did experience 'lights flashing' and epigastric burning with cold sweats for 15 minutes.  Denies nausea, chest pain or pain down his LEFT arm.  Said that it \"scared me\" but he kept on working.  Did not go to ER.  Since this episode he has felt more tired than normal, heart has been fluttering.  Dad had MI at age 59.  Admits his eating habits are \"horrible because of work.\"  Eats chili cheese Fritos, peanut butter crackers and Cheeze-Its, 5-Hour energy drinks at work.    Last upper GI has been in last ten years.    History of Present Illness     The following portions of the patient's history were reviewed and updated as appropriate: allergies, current medications, past family history, past medical history, past social history, past surgical history and problem list.    Review of Systems   Constitutional: Negative for chills, fatigue and fever.   HENT: Negative for congestion, sneezing, sore throat and trouble swallowing.    Eyes: Negative for visual disturbance.   Respiratory: Negative for cough, chest tightness, shortness of breath and wheezing.    Cardiovascular: Positive for palpitations. Negative for chest pain and leg swelling.   Gastrointestinal: Positive for abdominal pain. Negative for constipation, diarrhea, nausea and vomiting.   Endocrine: Positive for heat intolerance.   Genitourinary: Negative for dysuria, frequency and urgency.   Musculoskeletal: Negative for neck pain.   Skin: Negative for rash.   Neurological: Negative for dizziness, weakness and headaches.   Psychiatric/Behavioral:        In the past two weeks the pt has not felt down, depressed, hopeless or lost interest in doing things   All other " systems reviewed and are negative.      Objective   Physical Exam   Constitutional: He is oriented to person, place, and time. He appears well-developed and well-nourished. He is cooperative.  Non-toxic appearance. No distress.   HENT:   Head: Normocephalic and atraumatic.   Right Ear: Tympanic membrane and external ear normal.   Left Ear: Tympanic membrane and external ear normal.   Nose: Nose normal.   Mouth/Throat: Uvula is midline, oropharynx is clear and moist and mucous membranes are normal.   Eyes: Pupils are equal, round, and reactive to light. Conjunctivae, EOM and lids are normal. Right eye exhibits no discharge. Left eye exhibits no discharge. No scleral icterus.   Neck: Trachea normal, normal range of motion and phonation normal. Neck supple. No thyromegaly present.   Cardiovascular: Normal rate, regular rhythm, normal heart sounds and intact distal pulses. Exam reveals no gallop and no friction rub.   No murmur heard.  EKG shows sinus chepe with a ventricular rate of 57 bpm, incomplete RIGHT BBB with poor R wave progression, no inverted T waves or prolonged QRS, I do not have a previous EKG to compare   Pulmonary/Chest: Effort normal and breath sounds normal. No respiratory distress. He has no wheezes. He has no rales.   Abdominal: Soft. Normal appearance and bowel sounds are normal. He exhibits no distension. There is no tenderness. There is no rebound and no guarding.   Musculoskeletal: Normal range of motion. He exhibits no edema.   Lymphadenopathy:     He has no cervical adenopathy.   Neurological: He is alert and oriented to person, place, and time. No cranial nerve deficit. GCS eye subscore is 4. GCS verbal subscore is 5. GCS motor subscore is 6.   Skin: Skin is warm, dry and intact. Capillary refill takes less than 2 seconds. No rash noted.   Psychiatric: He has a normal mood and affect. His behavior is normal. Judgment and thought content normal.   Nursing note and vitals  reviewed.      Assessment/Plan   Cy was seen today for fatigue.    Diagnoses and all orders for this visit:    Essential hypertension  -     CBC & Differential; Future  -     Comprehensive Metabolic Panel; Future  -     Hemoglobin A1c; Future  -     T4, Free; Future  -     TSH; Future  -     Urinalysis With Culture If Indicated -; Future  -     Lipid Panel; Future    Gastroesophageal reflux disease without esophagitis  -     Ambulatory Referral to Gastroenterology    Prostate cancer screening  -     PSA Screen; Future    Excessive daytime sleepiness  -     Vitamin B12; Future  -     Vitamin D 25 Hydroxy; Future  -     XR Chest 2 View    Family history of MI (myocardial infarction)  -     Ambulatory Referral to Cardiology    Encouraged to start making healthy diet changes.  Walk for 15 minutes daily.

## 2020-01-07 DIAGNOSIS — R07.9 CHEST PAIN, UNSPECIFIED TYPE: Primary | ICD-10-CM

## 2020-02-25 ENCOUNTER — OFFICE VISIT (OUTPATIENT)
Dept: FAMILY MEDICINE CLINIC | Facility: CLINIC | Age: 60
End: 2020-02-25

## 2020-02-25 VITALS
DIASTOLIC BLOOD PRESSURE: 78 MMHG | SYSTOLIC BLOOD PRESSURE: 120 MMHG | WEIGHT: 275 LBS | HEART RATE: 75 BPM | BODY MASS INDEX: 37.25 KG/M2 | OXYGEN SATURATION: 99 % | HEIGHT: 72 IN

## 2020-02-25 DIAGNOSIS — R03.1 LOW BLOOD PRESSURE, NOT HYPOTENSION: Primary | ICD-10-CM

## 2020-02-25 DIAGNOSIS — H53.9 VISUAL DISTURBANCE: ICD-10-CM

## 2020-02-25 DIAGNOSIS — K21.9 GASTROESOPHAGEAL REFLUX DISEASE WITHOUT ESOPHAGITIS: ICD-10-CM

## 2020-02-25 PROCEDURE — 99213 OFFICE O/P EST LOW 20 MIN: CPT | Performed by: NURSE PRACTITIONER

## 2020-02-25 NOTE — PROGRESS NOTES
"Subjective   Cy Blair is a 59 y.o. male who presents to the office for HTN follow-up.  Tells me that he's had another episode of previous issue that occurred back in December with low BP reading after difficult passage of bowel movement and visual disturbance, as in his vision is shaky.  Has brought in blood pressure and pulse readings with time marked, BP dropped to 76/57 but pulse remained normal at 90.  But this episode that happened yesterday was \"not as strong as last time.\"  Says that he feels his heart fluttering.  Abnormal EKG on 1/7/2020.    Referrals were made back in December but he admits to not following through and refused them.  Will have my  open those back up and get him sent to those specialties.  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    Review of Systems   Constitutional: Negative for chills, fatigue and fever.   HENT: Negative for congestion, sneezing, sore throat and trouble swallowing.    Eyes: Positive for visual disturbance.   Respiratory: Negative for cough, chest tightness, shortness of breath and wheezing.    Cardiovascular: Positive for palpitations. Negative for chest pain and leg swelling.   Gastrointestinal: Negative for abdominal pain, constipation, diarrhea, nausea and vomiting.   Genitourinary: Negative for dysuria, frequency and urgency.   Musculoskeletal: Negative for neck pain.   Skin: Negative for rash.   Neurological: Negative for dizziness, weakness and headaches.   Psychiatric/Behavioral:        In the past two weeks the pt has not felt down, depressed, hopeless or lost interest in doing things   All other systems reviewed and are " negative.      Objective   Physical Exam   Constitutional: He is oriented to person, place, and time. He appears well-developed and well-nourished. He is cooperative.  Non-toxic appearance. No distress.   HENT:   Head: Normocephalic and atraumatic.   Right Ear: External ear normal.   Left Ear: External ear normal.   Nose: Nose normal.   Mouth/Throat: Oropharynx is clear and moist.   Eyes: Pupils are equal, round, and reactive to light. Conjunctivae and EOM are normal. Right eye exhibits no discharge. Left eye exhibits no discharge. No scleral icterus.   Neck: Normal range of motion. Neck supple. No thyromegaly present.   Cardiovascular: Normal rate, regular rhythm, normal heart sounds and intact distal pulses. Exam reveals no gallop and no friction rub.   No murmur heard.  Pulmonary/Chest: Effort normal and breath sounds normal. No respiratory distress. He has no wheezes. He has no rales.   Abdominal: Soft. Normal appearance and bowel sounds are normal. He exhibits no distension. There is no tenderness. There is no rebound and no guarding.   Musculoskeletal: Normal range of motion. He exhibits no edema.   Lymphadenopathy:     He has no cervical adenopathy.   Neurological: He is alert and oriented to person, place, and time. No cranial nerve deficit. GCS eye subscore is 4. GCS verbal subscore is 5. GCS motor subscore is 6.   Skin: Skin is warm, dry and intact. Capillary refill takes less than 2 seconds. No rash noted.   Psychiatric: He has a normal mood and affect. His behavior is normal. Judgment and thought content normal.   Nursing note and vitals reviewed.      Assessment/Plan   Cy was seen today for hypertension.    Diagnoses and all orders for this visit:    Low blood pressure, not hypotension    Visual disturbance  -     CT Head Without Contrast; Future    Gastroesophageal reflux disease without esophagitis    Encouraged medications as ordered.    Will reopen GI and cardio referrals.    Make position  changes slowly.  Continue keeping logs of abnormal vital signs.

## 2020-02-26 ENCOUNTER — HOSPITAL ENCOUNTER (OUTPATIENT)
Facility: HOSPITAL | Age: 60
Setting detail: HOSPITAL OUTPATIENT SURGERY
End: 2020-02-26
Attending: INTERNAL MEDICINE | Admitting: INTERNAL MEDICINE

## 2020-02-26 ENCOUNTER — OFFICE VISIT (OUTPATIENT)
Dept: GASTROENTEROLOGY | Facility: CLINIC | Age: 60
End: 2020-02-26

## 2020-02-26 VITALS
BODY MASS INDEX: 37.84 KG/M2 | SYSTOLIC BLOOD PRESSURE: 140 MMHG | WEIGHT: 279.4 LBS | HEIGHT: 72 IN | DIASTOLIC BLOOD PRESSURE: 82 MMHG | HEART RATE: 74 BPM

## 2020-02-26 DIAGNOSIS — R10.13 EPIGASTRIC PAIN: ICD-10-CM

## 2020-02-26 DIAGNOSIS — K21.00 GASTROESOPHAGEAL REFLUX DISEASE WITH ESOPHAGITIS: Primary | ICD-10-CM

## 2020-02-26 PROCEDURE — 99214 OFFICE O/P EST MOD 30 MIN: CPT | Performed by: NURSE PRACTITIONER

## 2020-02-26 RX ORDER — SODIUM CHLORIDE 0.9 % (FLUSH) 0.9 %
10 SYRINGE (ML) INJECTION AS NEEDED
Status: CANCELLED | OUTPATIENT
Start: 2020-02-26

## 2020-02-26 RX ORDER — OMEPRAZOLE 20 MG/1
20 CAPSULE, DELAYED RELEASE ORAL DAILY
COMMUNITY
End: 2020-03-18

## 2020-02-26 RX ORDER — SODIUM CHLORIDE 0.9 % (FLUSH) 0.9 %
3 SYRINGE (ML) INJECTION EVERY 12 HOURS SCHEDULED
Status: CANCELLED | OUTPATIENT
Start: 2020-02-26

## 2020-02-26 RX ORDER — DEXTROSE AND SODIUM CHLORIDE 5; .45 G/100ML; G/100ML
30 INJECTION, SOLUTION INTRAVENOUS CONTINUOUS PRN
Status: CANCELLED | OUTPATIENT
Start: 2020-02-26

## 2020-02-26 NOTE — PROGRESS NOTES
Chief Complaint   Patient presents with   • Heartburn       Subjective    Cy Blair is a 59 y.o. male. he is here today.    59-year-old male presents to discuss reflux.  Reports he was referred previously but did not follow-up.  Reports previously he was evaluated at [facility] but has moved to [place] to take care of his aging parents.  States he works third shift and does not eat as he should typically chili cheese Fritos, peanut butter crackers cheese its and 5-hour energy drinks as needed at work.    Heartburn   He complains of abdominal pain, heartburn and nausea (some not frequent ). He reports no belching, no chest pain, no choking, no coughing, no dysphagia, no early satiety, no globus sensation, no hoarse voice or no sore throat. This is a chronic (several years ago given prilosec then symptoms restarted 3 years ago and have progressively became more severe ) problem. The problem occurs frequently. The problem has been waxing and waning. The heartburn duration is less than a minute. The heartburn is located in the substernum. The heartburn is of moderate intensity. The heartburn wakes him from sleep. The heartburn limits his activity. The heartburn changes with position. The symptoms are aggravated by lying down. Associated symptoms include fatigue. Belching, bloating with intake   Wakes up with tongue burning, gurgling sounds . Risk factors include lack of exercise and obesity. He has tried a PPI (prilosec works best. In the past has tried prevacid and nexium ) for the symptoms. The treatment provided mild relief.     Plan; schedule patient for EGD to further evaluate recommend continue PPI daily increase to 40 mg/day.       The following portions of the patient's history were reviewed and updated as appropriate:   Past Medical History:   Diagnosis Date   • Candidiasis of skin     Candidiasis of skin - has flareups during the summer   • Essential (primary) hypertension      Past Surgical  History:   Procedure Laterality Date   • KIDNEY STONE SURGERY     • NECK SURGERY      for herniated discs and spinal stenosis     Family History   Problem Relation Age of Onset   • Heart attack Mother 81        POSSIBLE MI   • Heart disease Father    • Sudden death Other    • Hypertension Brother    • Dementia Maternal Grandmother    • No Known Problems Maternal Grandfather    • No Known Problems Paternal Grandmother    • Clotting disorder Paternal Grandfather        Prior to Admission medications    Medication Sig Start Date End Date Taking? Authorizing Provider   aspirin 81 MG chewable tablet Chew 81 mg Daily.   Yes ProviderFernando MD   cefdinir (OMNICEF) 300 MG capsule Take 1 capsule by mouth 2 (Two) Times a Day. 3/21/19  Yes Lakesha Lincoln APRN   lisinopril (PRINIVIL,ZESTRIL) 40 MG tablet TAKE 1 TABLET DAILY 9/24/19  Yes Sarah Gilbert APRN   losartan-hydrochlorothiazide (HYZAAR) 100-12.5 MG per tablet TAKE 1 TABLET DAILY 9/3/19  Yes Sarah Gilbert APRN   Omega-3 Fatty Acids (FISH OIL BURP-LESS) 1200 MG capsule Take  by mouth.   Yes ProviderFernando MD   omeprazole (priLOSEC) 20 MG capsule Take 20 mg by mouth Daily.   Yes ProviderFernando MD   [id number]-[id number]-[id number]-FA-Omega (MULTIVITAMIN/MINERALS PO) Med Name: multivitamin & minerals #11-folic acid oral   Yes ProviderFernando MD   senna-docusate (SENOKOT S) 8.6-50 MG per tablet 1-2 tablets Daily.   Yes ProviderFernando MD   vitamin C (ASCORBIC ACID) 500 MG tablet Take 500 mg by mouth Daily.   Yes Emergency, Nurse Chioma, RN     No Known Allergies  Social History     Socioeconomic History   • Marital status: Unknown     Spouse name: Not on file   • Number of children: Not on file   • Years of education: Not on file   • Highest education level: Not on file   Tobacco Use   • Smoking status: Never Smoker   • Smokeless tobacco: Never Used   Substance and Sexual Activity   • Alcohol use: No   • Drug use: No   • Sexual activity: Defer  "      Review of Systems  Review of Systems   Constitutional: Positive for diaphoresis (cold sweats ) and fatigue. Negative for activity change, appetite change, chills, fever and unexpected weight change.   HENT: Negative for hoarse voice, sore throat and trouble swallowing.    Respiratory: Negative for cough, choking and shortness of breath.    Cardiovascular: Negative for chest pain.   Gastrointestinal: Positive for abdominal distention, abdominal pain, constipation (chronic ), heartburn and nausea (some not frequent ). Negative for anal bleeding, blood in stool, diarrhea, dysphagia, rectal pain and vomiting.   Musculoskeletal: Negative for arthralgias.   Skin: Negative for pallor.   Neurological: Negative for light-headedness.        /82 (BP Location: Left arm)   Pulse 74   Ht 182.9 cm (72\")   Wt 127 kg (279 lb 6.4 oz)   BMI 37.89 kg/m²     Objective    Physical Exam   Constitutional: He is oriented to person, place, and time. He appears well-developed and well-nourished. He is cooperative. No distress.   HENT:   Head: Normocephalic and atraumatic.   Neck: Normal range of motion. Neck supple. No thyromegaly present.   Cardiovascular: Normal rate, regular rhythm and normal heart sounds.   Pulmonary/Chest: Effort normal and breath sounds normal. He has no wheezes. He has no rhonchi. He has no rales.   Abdominal: Soft. Normal appearance and bowel sounds are normal. He exhibits no distension. There is no hepatosplenomegaly. There is tenderness in the epigastric area. There is no rigidity and no guarding. No hernia.   Lymphadenopathy:     He has no cervical adenopathy.   Neurological: He is alert and oriented to person, place, and time.   Skin: Skin is warm, dry and intact. No rash noted. No pallor.   Psychiatric: He has a normal mood and affect. His speech is normal.     Lab on 12/20/2019   Component Date Value Ref Range Status   • Color, UA 12/20/2019 Dark Yellow* Yellow, Straw Final   • Appearance, UA " 12/20/2019 Slightly Cloudy* Clear Final   • pH, UA 12/20/2019 6.0  5.5 - 8.0 Final   • Specific Gravity, UA 12/20/2019 1.015  1.005 - 1.030 Final   • Glucose, UA 12/20/2019 Negative  Negative Final   • Ketones, UA 12/20/2019 Trace* Negative Final   • Bilirubin, UA 12/20/2019 Negative  Negative Final   • Blood, UA 12/20/2019 Negative  Negative Final   • Protein, UA 12/20/2019 Negative  Negative Final   • Leuk Esterase, UA 12/20/2019 Negative  Negative Final   • Nitrite, UA 12/20/2019 Negative  Negative Final   • Urobilinogen, UA 12/20/2019 1.0 E.U./dL  0.2 - 1.0 E.U./dL Final   • Glucose 12/20/2019 98  70 - 99 mg/dL Final   • BUN 12/20/2019 15  7 - 23 mg/dL Final   • Creatinine 12/20/2019 1.10  0.70 - 1.30 mg/dL Final   • Sodium 12/20/2019 140  137 - 145 mmol/L Final   • Potassium 12/20/2019 3.8  3.4 - 5.0 mmol/L Final   • Chloride 12/20/2019 101  101 - 112 mmol/L Final   • CO2 12/20/2019 30.0  22.0 - 30.0 mmol/L Final   • Calcium 12/20/2019 10.2  8.4 - 10.2 mg/dL Final   • Total Protein 12/20/2019 8.3  6.3 - 8.6 g/dL Final   • Albumin 12/20/2019 4.90  3.50 - 5.00 g/dL Final   • ALT (SGPT) 12/20/2019 19  <=50 U/L Final   • AST (SGOT) 12/20/2019 23  17 - 59 U/L Final   • Alkaline Phosphatase 12/20/2019 79  38 - 126 U/L Final   • Total Bilirubin 12/20/2019 1.2  0.2 - 1.3 mg/dL Final   • eGFR Non  Amer 12/20/2019 68  49 - 113 mL/min/1.73 Final   • Globulin 12/20/2019 3.4  2.3 - 3.5 gm/dL Final   • A/G Ratio 12/20/2019 1.4  1.1 - 1.8 g/dL Final   • BUN/Creatinine Ratio 12/20/2019 13.6  7.0 - 25.0 Final   • Anion Gap 12/20/2019 9.0  5.0 - 15.0 mmol/L Final   • Hemoglobin A1C 12/20/2019 5.29  4.80 - 5.60 % Final   • Free T4 12/20/2019 1.52  0.93 - 1.70 ng/dL Final   • TSH 12/20/2019 1.760  0.270 - 4.200 uIU/mL Final   • Vitamin B-12 12/20/2019 952* 211 - 946 pg/mL Final   • 25 Hydroxy, Vitamin D 12/20/2019 38.1  30.0 - 100.0 ng/ml Final   • Total Cholesterol 12/20/2019 150  150 - 200 mg/dL Final   • Triglycerides  12/20/2019 97  <=150 mg/dL Final   • HDL Cholesterol 12/20/2019 42  40 - 59 mg/dL Final   • LDL Cholesterol  12/20/2019 89  <=100 mg/dL Final   • VLDL Cholesterol 12/20/2019 19.4  mg/dL Final   • LDL/HDL Ratio 12/20/2019 2.11  0.00 - 3.55 Final   • PSA 12/20/2019 0.180  0.000 - 4.000 ng/mL Final   • WBC 12/20/2019 7.16  3.40 - 10.80 10*3/mm3 Final   • RBC 12/20/2019 5.19  4.14 - 5.80 10*6/mm3 Final   • Hemoglobin 12/20/2019 16.0  13.0 - 17.7 g/dL Final   • Hematocrit 12/20/2019 44.2  37.5 - 51.0 % Final   • MCV 12/20/2019 85.2  79.0 - 97.0 fL Final   • MCH 12/20/2019 30.8  26.6 - 33.0 pg Final   • MCHC 12/20/2019 36.2* 31.5 - 35.7 g/dL Final   • RDW 12/20/2019 12.8  12.3 - 15.4 % Final   • RDW-SD 12/20/2019 39.8  37.0 - 54.0 fl Final   • MPV 12/20/2019 10.5  6.0 - 12.0 fL Final   • Platelets 12/20/2019 226  140 - 450 10*3/mm3 Final   • Neutrophil % 12/20/2019 64.3  42.7 - 76.0 % Final   • Lymphocyte % 12/20/2019 25.7  19.6 - 45.3 % Final   • Monocyte % 12/20/2019 8.5  5.0 - 12.0 % Final   • Eosinophil % 12/20/2019 1.1  0.3 - 6.2 % Final   • Basophil % 12/20/2019 0.4  0.0 - 1.5 % Final   • Neutrophils, Absolute 12/20/2019 4.60  1.70 - 7.00 10*3/mm3 Final   • Lymphocytes, Absolute 12/20/2019 1.84  0.70 - 3.10 10*3/mm3 Final   • Monocytes, Absolute 12/20/2019 0.61  0.10 - 0.90 10*3/mm3 Final   • Eosinophils, Absolute 12/20/2019 0.08  0.00 - 0.40 10*3/mm3 Final   • Basophils, Absolute 12/20/2019 0.03  0.00 - 0.20 10*3/mm3 Final     Assessment/Plan      1. Gastroesophageal reflux disease with esophagitis    2. Epigastric pain    .       Orders placed during this encounter include:  Orders Placed This Encounter   Procedures   • Follow Anesthesia Guidelines / Standing Orders     Standing Status:   Future   • Obtain Informed Consent     Standing Status:   Future     Order Specific Question:   Informed Consent Given For     Answer:   ESOPHAGOGASTRODUODENOSCOPY possible dilation       ESOPHAGOGASTRODUODENOSCOPY possible  dilation (N/A)    Review and/or summary of lab tests, radiology, procedures, medications. Review and summary of old records and obtaining of history. The risks and benefits of my recommendations, as well as other treatment options were discussed with the patient today. Questions were answered.    No orders of the defined types were placed in this encounter.      Follow-up: Return in about 4 weeks (around 3/25/2020).          This document has been electronically signed by MARTINA Montelongo on February 26, 2020 11:47 AM             Results for orders placed or performed in visit on 12/20/19   PSA Screen   Result Value Ref Range    PSA 0.180 0.000 - 4.000 ng/mL   Urinalysis With Culture If Indicated - Urine, Clean Catch   Result Value Ref Range    Color, UA Dark Yellow (A) Yellow, Straw    Appearance, UA Slightly Cloudy (A) Clear    pH, UA 6.0 5.5 - 8.0    Specific Gravity, UA 1.015 1.005 - 1.030    Glucose, UA Negative Negative    Ketones, UA Trace (A) Negative    Bilirubin, UA Negative Negative    Blood, UA Negative Negative    Protein, UA Negative Negative    Leuk Esterase, UA Negative Negative    Nitrite, UA Negative Negative    Urobilinogen, UA 1.0 E.U./dL 0.2 - 1.0 E.U./dL   CBC Auto Differential   Result Value Ref Range    WBC 7.16 3.40 - 10.80 10*3/mm3    RBC 5.19 4.14 - 5.80 10*6/mm3    Hemoglobin 16.0 13.0 - 17.7 g/dL    Hematocrit 44.2 37.5 - 51.0 %    MCV 85.2 79.0 - 97.0 fL    MCH 30.8 26.6 - 33.0 pg    MCHC 36.2 (H) 31.5 - 35.7 g/dL    RDW 12.8 12.3 - 15.4 %    RDW-SD 39.8 37.0 - 54.0 fl    MPV 10.5 6.0 - 12.0 fL    Platelets 226 140 - 450 10*3/mm3    Neutrophil % 64.3 42.7 - 76.0 %    Lymphocyte % 25.7 19.6 - 45.3 %    Monocyte % 8.5 5.0 - 12.0 %    Eosinophil % 1.1 0.3 - 6.2 %    Basophil % 0.4 0.0 - 1.5 %    Neutrophils, Absolute 4.60 1.70 - 7.00 10*3/mm3    Lymphocytes, Absolute 1.84 0.70 - 3.10 10*3/mm3    Monocytes, Absolute 0.61 0.10 - 0.90 10*3/mm3    Eosinophils, Absolute 0.08 0.00 - 0.40  10*3/mm3    Basophils, Absolute 0.03 0.00 - 0.20 10*3/mm3   Vitamin D 25 Hydroxy   Result Value Ref Range    25 Hydroxy, Vitamin D 38.1 30.0 - 100.0 ng/ml   TSH   Result Value Ref Range    TSH 1.760 0.270 - 4.200 uIU/mL   T4, Free   Result Value Ref Range    Free T4 1.52 0.93 - 1.70 ng/dL   Hemoglobin A1c   Result Value Ref Range    Hemoglobin A1C 5.29 4.80 - 5.60 %   Vitamin B12   Result Value Ref Range    Vitamin B-12 952 (H) 211 - 946 pg/mL   Lipid Panel   Result Value Ref Range    Total Cholesterol 150 150 - 200 mg/dL    Triglycerides 97 <=150 mg/dL    HDL Cholesterol 42 40 - 59 mg/dL    LDL Cholesterol  89 <=100 mg/dL    VLDL Cholesterol 19.4 mg/dL    LDL/HDL Ratio 2.11 0.00 - 3.55   Comprehensive Metabolic Panel   Result Value Ref Range    Glucose 98 70 - 99 mg/dL    BUN 15 7 - 23 mg/dL    Creatinine 1.10 0.70 - 1.30 mg/dL    Sodium 140 137 - 145 mmol/L    Potassium 3.8 3.4 - 5.0 mmol/L    Chloride 101 101 - 112 mmol/L    CO2 30.0 22.0 - 30.0 mmol/L    Calcium 10.2 8.4 - 10.2 mg/dL    Total Protein 8.3 6.3 - 8.6 g/dL    Albumin 4.90 3.50 - 5.00 g/dL    ALT (SGPT) 19 <=50 U/L    AST (SGOT) 23 17 - 59 U/L    Alkaline Phosphatase 79 38 - 126 U/L    Total Bilirubin 1.2 0.2 - 1.3 mg/dL    eGFR Non  Amer 68 49 - 113 mL/min/1.73    Globulin 3.4 2.3 - 3.5 gm/dL    A/G Ratio 1.4 1.1 - 1.8 g/dL    BUN/Creatinine Ratio 13.6 7.0 - 25.0    Anion Gap 9.0 5.0 - 15.0 mmol/L   Results for orders placed or performed in visit on 06/28/18   Hemoglobin A1c   Result Value Ref Range    Hemoglobin A1C 5.2 4 - 5.6 %   Lipid Panel   Result Value Ref Range    Total Cholesterol 138 (L) 150 - 200 mg/dL    Triglycerides 110 <=150 mg/dL    HDL Cholesterol 39 (L) 40 - 59 mg/dL    LDL Cholesterol  77 <=100 mg/dL    VLDL Cholesterol 22 mg/dL    LDL/HDL Ratio 1.97 0.00 - 3.55   Results for orders placed or performed in visit on 04/25/18   Basic Metabolic Panel   Result Value Ref Range    Glucose 102 (H) 74 - 99 mg/dL    BUN 17 9 - 20  mg/dL    Creatinine 0.99 0.66 - 1.25 mg/dL    Sodium 141 137 - 145 mmol/L    Potassium 4.4 3.4 - 5.0 mmol/L    Chloride 103 98 - 107 mmol/L    CO2 27.0 22.0 - 30.0 mmol/L    Calcium 9.7 8.4 - 10.2 mg/dL    eGFR Non  Amer 77 49 - 113 mL/min/1.73    BUN/Creatinine Ratio 17.2 7.0 - 25.0    Anion Gap 11.0 5.0 - 15.0 mmol/L   Results for orders placed or performed during the hospital encounter of 02/19/18   POCT Influenza A/B   Result Value Ref Range    Rapid Influenza A Ag neg     Rapid Influenza B Ag pos     Internal Control Passed Passed    Lot Number [id number]     Expiration Date 3/20    Results for orders placed or performed in visit on 09/13/17   CBC Auto Differential   Result Value Ref Range    WBC 4.91 3.20 - 9.80 10*3/mm3    RBC 4.97 4.37 - 5.74 10*6/mm3    Hemoglobin 15.5 13.7 - 17.3 g/dL    Hematocrit 43.6 39.0 - 49.0 %    MCV 87.7 80.0 - 98.0 fL    MCH 31.2 26.5 - 34.0 pg    MCHC 35.6 31.5 - 36.3 g/dL    RDW 13.0 11.5 - 14.5 %    RDW-SD 40.4 35.1 - 43.9 fl    MPV 10.9 8.0 - 12.0 fL    Platelets 216 150 - 450 10*3/mm3    Neutrophil % 58.4 37.0 - 80.0 %    Lymphocyte % 28.7 10.0 - 50.0 %    Monocyte % 9.4 0.0 - 12.0 %    Eosinophil % 2.9 0.0 - 7.0 %    Basophil % 0.6 0.0 - 2.0 %    Neutrophils, Absolute 2.87 2.00 - 8.60 10*3/mm3    Lymphocytes, Absolute 1.41 0.60 - 4.20 10*3/mm3    Monocytes, Absolute 0.46 0.00 - 0.90 10*3/mm3    Eosinophils, Absolute 0.14 0.00 - 0.70 10*3/mm3    Basophils, Absolute 0.03 0.00 - 0.20 10*3/mm3     *Note: Due to a large number of results and/or encounters for the requested time period, some results have not been displayed. A complete set of results can be found in Results Review.

## 2020-02-26 NOTE — PATIENT INSTRUCTIONS
Heartburn  Heartburn is a type of pain or discomfort that can happen in the throat or chest. It is often described as a burning pain. It may also cause a bad, acid-like taste in the mouth. Heartburn may feel worse when you lie down or bend over, and it is often worse at night. Heartburn may be caused by stomach contents that move back up into the esophagus (reflux).  Follow these instructions at home:  Eating and drinking    · Avoid certain foods and drinks as told by your health care provider. This may include:  ? Coffee and tea (with or without caffeine).  ? Drinks that contain alcohol.  ? Energy drinks and sports drinks.  ? Carbonated drinks or sodas.  ? Chocolate and cocoa.  ? Peppermint and mint flavorings.  ? Garlic and onions.  ? Horseradish.  ? Spicy and acidic foods, including peppers, chili powder, jaffe powder, vinegar, hot sauces, and barbecue sauce.  ? Citrus fruit juices and citrus fruits, such as oranges, diego, and limes.  ? Tomato-based foods, such as red sauce, chili, salsa, and pizza with red sauce.  ? Fried and fatty foods, such as donuts, french fries, potato chips, and high-fat dressings.  ? High-fat meats, such as hot dogs and fatty cuts of red and white meats, such as rib eye steak, sausage, ham, and breaux.  ? High-fat dairy items, such as whole milk, butter, and cream cheese.  · Eat small, frequent meals instead of large meals.  · Avoid drinking large amounts of liquid with your meals.  · Avoid eating meals during the 2-3 hours before bedtime.  · Avoid lying down right after you eat.  · Do not exercise right after you eat.  Lifestyle         · If you are overweight, reduce your weight to an amount that is healthy for you. Ask your health care provider for guidance about a safe weight loss goal.  · Do not use any products that contain nicotine or tobacco, such as cigarettes, e-cigarettes, and chewing tobacco. These can make your symptoms worse. If you need help quitting, ask your health care  provider.  · Wear loose-fitting clothing. Do not wear anything tight around your waist that causes pressure on your abdomen.  · Raise (elevate) the head of your bed about 6 inches (15 cm) when you sleep.  · Try to reduce your stress, such as with yoga or meditation. If you need help reducing stress, ask your health care provider.  General instructions  · Pay attention to any changes in your symptoms.  · Take over-the-counter and prescription medicines only as told by your health care provider.  ? Do not take aspirin, ibuprofen, or other NSAIDs unless your health care provider told you to do so.  ? Stop medicines only as told by your health care provider. If you stop taking some medicines too quickly, your symptoms may get worse.  · Keep all follow-up visits as told by your health care provider. This is important.  Contact a health care provider if:  · You have new symptoms.  · You have unexplained weight loss.  · You have difficulty swallowing, or it hurts to swallow.  · You have wheezing or a persistent cough.  · Your symptoms do not improve with treatment.  · You have frequent heartburn for more than 2 weeks.  Get help right away if:  · You have pain in your arms, neck, jaw, teeth, or back.  · You feel sweaty, dizzy, or light-headed.  · You have chest pain or shortness of breath.  · You vomit and your vomit looks like blood or coffee grounds.  · Your stool is bloody or black.  These symptoms may represent a serious problem that is an emergency. Do not wait to see if the symptoms will go away. Get medical help right away. Call your local emergency services (911 in the [place] Do not drive yourself to the hospital.  Summary  · Heartburn is a type of pain or discomfort that can happen in the throat or chest. It is often described as a burning pain. It may also cause a bad, acid-like taste in the mouth.  · Avoid certain foods and drinks as told by your health care provider.  · Take over-the-counter and prescription  medicines only as told by your health care provider. Do not take aspirin, ibuprofen, or other NSAIDs unless your health care provider told you to do so.  · Contact a health care provider if your symptoms do not improve or they get worse.  This information is not intended to replace advice given to you by your health care provider. Make sure you discuss any questions you have with your health care provider.  Document Released: 05/06/2010 Document Revised: 05/20/2019 Document Reviewed: 05/20/2019  [employer] Interactive Patient Education © 2020 [employer]

## 2020-02-27 RX ORDER — LOSARTAN POTASSIUM AND HYDROCHLOROTHIAZIDE 12.5; 1 MG/1; MG/1
TABLET ORAL
Qty: 90 TABLET | Refills: 1 | Status: SHIPPED | OUTPATIENT
Start: 2020-02-27 | End: 2020-08-17

## 2020-03-05 ENCOUNTER — OFFICE VISIT (OUTPATIENT)
Dept: GASTROENTEROLOGY | Facility: CLINIC | Age: 60
End: 2020-03-05

## 2020-03-05 VITALS
DIASTOLIC BLOOD PRESSURE: 88 MMHG | BODY MASS INDEX: 37.65 KG/M2 | WEIGHT: 278 LBS | HEART RATE: 67 BPM | HEIGHT: 72 IN | SYSTOLIC BLOOD PRESSURE: 138 MMHG

## 2020-03-05 DIAGNOSIS — R14.0 BLOATING: ICD-10-CM

## 2020-03-05 DIAGNOSIS — K21.00 GASTROESOPHAGEAL REFLUX DISEASE WITH ESOPHAGITIS: Primary | ICD-10-CM

## 2020-03-05 PROCEDURE — 99213 OFFICE O/P EST LOW 20 MIN: CPT | Performed by: NURSE PRACTITIONER

## 2020-03-05 NOTE — PATIENT INSTRUCTIONS
ÍÑÞÉ ÇáãÚÏÉ  Heartburn  ÍÑÞÉ ÇáãÚÏÉ åí Ãáã Ãæ ÅÒÚÇÌ ÞÏ íÕíÈ ÇáÍáÞ Ãæ ÇáÕÏÑ. æÚÇÏÉ ãÇ íæÕÝ ÈÃäå Ãáã ÍÇÑÞ. ßãÇ Ãäå ÞÏ íÓÈÈ ãÐÇÞðÇ ÍãÖíðÇ ÓíÆðÇ Ýí ÇáÝã. æÑÈãÇ íÒÏÇÏ ÇáÃáã ÇáäÇÊÌ Úä ÍÑÞÉ ÇáãÚÏÉ ÚäÏ ÇáÇÓÊáÞÇÁ Ãæ ÇáÇäÍäÇÁ¡ æÚÇÏÉ ãÇ ÊÓæÁ ÇáÍÇáÉ Ýí ÇáãÓÇÁ. ßÐáß ÞÏ ÊÍÏË ÍÑÞÉ ÇáãÚÏÉ äÊíÌÉ áÇÑÊÌÇÚ ãÍÊæíÇÊ ÇáãÚÏÉ áÃÚáì Åáì ÏÇÎá ÇáãÑíÁ (ÌóÒúÑ).  íÌÈ ÇÊÈÇÚ åÐå ÇáÊÚáíãÇÊ Ýí ÇáãäÒá:  ÇáÃßá æÇáÔÑÈ    • ÊÌäÈ ÊäÇæá ÇáÃØÚãÉ æÇáãÔÑæÈÇÊ ÇáÊí íæÕí ãõÞÏøöã ÇáÑÚÇíÉ ÇáÕÍíÉ ÇáãÊÇÈÚ áß ÈÊÌäÈåÇ. æÑÈãÇ íÔãá Ðáß:  ? ÇáÞåæÉ æÇáÔÇí (ÈÇáßÇÝííä Ãæ ÇáÎÇáííä ãäå).  ? ÇáãÔÑæÈÇÊ ÇáãÍÊæíÉ Úáì ßÍæá.  ? ãÔÑæÈÇÊ ÇáØÇÞÉ æÇáãÔÑæÈÇÊ ÇáÑíÇÖíÉ.  ? ÇáãÔÑæÈÇÊ ÇáÛÇÒíÉ Ãæ ÇáÕæÏÇ.  ? ÇáÔæßæáÇÊÉ æÇáßÇßÇæ.  ? äßåÇÊ ÇáÝáÝá æÇáäÚäÇÚ.  ? ÇáËæã æÇáÈÕá.  ? ÇáÝÌá ÇáÍÇÑ.  ? ÇáÃØÚãÉ ÇáÍÑíÝÉ Ãæ ÇáÍãÖíÉ¡ æãä ÈíäåÇ ÇáÈåÇÑÇÊ æãÓÍæÞ ÇáÝáÝá ÇáÍÇÑ æãÓÍæÞ ÇáßÇÑí æÇáÎá æÇáÕáÕÉ ÇáÍÇÑÉ æÕáÕÉ ÇáÈÇÑÈßíæ.  ? ÚÕÇÆÑ ÇáÝæÇßå ÇáÍãÖíÉ¡ ãËá ÇáÈÑÊÞÇá æÇááíãæä æÇááíãæä ÇáÍÇãÖ.  ? ÇáÃØÚãÉ ÇáÊí ÊÚÊãÏ Úáì ÇáØãÇØã ãËá ÇáÕáÕÉ æÇáÝáÝá ÇáÍÇÑ æÇáÓáØÉ æÇáÈíÊÒÇ ÇáãÒæÏÉ ÈÇáÕáÕÉ ÇáÍãÑÇÁ.  ? ÇáÃØÚãÉ ÇáãÞáíÉ æÇáÛäíÉ ÈÇáÏåæä¡ ãËá ÇáÏæäÇÊÓ æÇáÈØÇØÓ ÇáãÞáíÉ æÔÑÇÆÍ ÇáÈØÇØÓ æÇáÊÊÈíáÇÊ ÚÇáíÉ ÇáÏåæä.  ? ÇááÍæã ÚÇáíÉ ÇáÏåæä¡ ãËá ÇáåæÊ ÏæÌ æÇááÍæã ÇáÍãÑÇÁ æÇáÈíÖÇÁ ÇáÛäíÉ ÈÇáÏåæä¡ ãËá ÔÑÇÆÍ ÇáÖáæÚ æÇáÓæÓíÓ æÇáßÝá æÇááÍã ÇáãÞÏÏ.  ? ãäÊÌÇÊ ÇáÃáÈÇä ÚÇáíÉ ÇáÏåæä¡ ãËá ÇáÍáíÈ ßÇãá ÇáÏÓã æÇáÒÈÏ æÇáÌÈä ÇáßÑíãí.  • íÌÈ ÊäÇæá æÌÈÇÊ ÕÛíÑÉ æãÊßÑÑÉ ÈÏáÇð ãä ÇáæÌÈÇÊ ÇáßÈíÑÉ.  • íÌÈ ÊÌäÈ ÔÑÈ ßãíÇÊ ßÈíÑÉ ãä ÇáÓæÇÆá ãÚ ÇáæÌÈÇÊ.  • íÌÈ ÊÌäÈ ÊäÇæá æÌÈÇÊ ÎáÇá 2-3 ÓÇÚÇÊ ÞÈá Çáäæã.  • íÌÈ ÊÌäÈ ÇáÇÓÊáÞÇÁ ãÈÇÔÑÉ ÈÚÏ ÇáÃßá.  • ýíÊÚíä ÚÏã ããÇÑÓÉ ÇáÊãÑíäÇÊ ÇáÑíÇÖíÉ ÈÚÏ ÇáÃßá ãÈÇÔÑÉ.  äãØ ÇáÍíÇÉ      • Ýí ÍÇáÉ ÒíÇÏÉ ÇáæÒä¡ ÝíÌÈ ÎÝÖ ÇáæÒä Åáì ÇáæÒä ÇáÕÍí. íÌÈ ÇÓÊÔÇÑÉ æØáÈ ÊæÌíåÇÊ ãÞÏã ÇáÑÚÇíÉ ÇáÕÍíÉ Íæá ßíÝíÉ ÎÝÖ ÇáæÒä ÈØÑíÞÉ ÕÍíÉ æÂãäÉ.  • íÊÚíä ÚÏã ÇÓÊÎÏÇã Ãí ãäÊÌÇÊ ÊÍÊæí Úáì ÇáäíßæÊíä Ãæ ÇáÊÈÛ¡ ãËá ÇáÓÌÇÆÑ æÇáÓÌÇÆÑ ÇáÅáßÊÑæäíÉ æÊÈÛ ÇáãÖÛ. ÝåÐå ÇáãäÊÌÇÊ ãä ÔÃäåÇ ÇáÊÓÈÈ Ýí ÊÝÇÞã ÇáÃÚÑÇÖ ÇáÊí ÊÚÇäí ãäåÇ. íÌÈ ÇÓÊÔÇÑÉ ãÞÏã ÇáÑÚÇíÉ ÇáÕÍíÉ ÅÐÇ ÇÍÊÌÊ Åáì  ÇáãÓÇÚÏÉ ááÅÞáÇÚ Úä ÇáÊÏÎíä.  • íÌÈ ÇÑÊÏÇÁ ãáÇÈÓ æÇÓÚÉ. æíÌÈ ÚÏã ÇÑÊÏÇÁ ÔíÁ ÖíÞ Íæá ÇáÎÕÑ ÞÏ íÖÛØ Úáì ÇáãÚÏÉ.  • íÌÈ ÑÝÚ ÑÃÓ ÓÑíÑß ÈãÞÏÇÑ 6 ÈæÕÇÊ (15 Óã) ÊÞÑíÈðÇ ÚäÏ Çáäæã.  • íÌÈ ãÍÇæáÉ ÇáÍÏ ãä ÇáÖÛØ ÇáÐí ÊÊÚÑÖ áå¡ Úä ØÑíÞ ããÇÑÓÉ ÇáíæÌÇ Ãæ ÇáÊÃãá Úáì ÓÈíá ÇáãËÇá. ÃãÇ Ýí ÍÇáÉ ÇáÍÇÌÉ áãÓÇÚÏÉ áÎÝÖ ãÓÊæì ÇáÅÌåÇÏ¡ Ýíãßä ÇÓÊÔÇÑÉ ãõÞÏã ÇáÑÚÇíÉ ÇáÕÍíÉ.  ÊÚáíãÇÊ ÚÇãÉ  • íÊÚíä ÇáÇäÊÈÇå áÌãíÚ ÇáÊÛíÑÇÊ ÇáÊí ÊØÑÃ Úáì ÇáÃÚÑÇÖ ÇáÊí ÊÚÇäíåÇ.  • íÊÚíä ÊäÇæá ÇáÃÏæíÉ ÇáÊí ÊõÕÑÝ ÈæÕÝÉ ØÈíÉ Ãæ Ïæä æÕÝÉ ØÈíÉ æÝÞðÇ áãÇ íÎÈÑßö Èå ãÞÏã ÇáÑÚÇíÉ ÇáÕÍíÉ ÇáÎÇÕ Èß.  ? íÊÚíä ÚÏã ÊäÇæá ÇáÃÓÈÑíä Ãæ ÇáÃíÈæÈÑæÝíä Ãæ ÛíÑå ãä ãÖÇÏÇÊ ÇáÇáÊåÇÈÇÊ ÇááÇÓÊíÑæíÏíÉ ÅáÇ ÈãæÇÝÞÉ ãÞÏã ÇáÑÚÇíÉ.  ? íÌÈ Ãä íßæä ÅíÞÇÝ ÊäÇæá Ãí ÏæÇÁ ÝÞØ ÈÍÓÈ ÅÑÔÇÏÇÊ ãÞÏã ÇáÑÚÇíÉ ÇáÕÍíÉ ÇáãÊÇÈÚ áß. æÓÈÈ Ðáß Ãä ËãÉ ÃÏæíÉ ãÚíäÉ ÞÏ íõÄÏí ÇáÊÚÌá Ýí ÅíÞÇÝåÇ Åáì ÊÝÇÞã ÇáÃÚÑÇÖ æÒíÇÏÊåÇ ÓæÁðÇ.  • íÊÚíä ÇáÇáÊÒÇã ÈÌãíÚ ãæÇÚíÏ ÇáãÊÇÈÚÉ æÝÞðÇ áÊæÌíåÇÊ ãÞÏã ÇáÑÚÇíÉ ÇáÕÍíÉ. ÝåÐÇ ÃãÑ ãåã.  íÌÈ ÇáÇÊÕÇá ÈãÞÏã ÇáÑÚÇíÉ ÇáÕÍíÉ Ýí ÇáÍÇáÇÊ ÇáÊÇáíÉ:  • ÙåæÑ ÃÚÑÇÖ ÌÏíÏÉ Úáíß.  • ÝÞÏÇä ÇáæÒä ÈÔßá ÛíÑ ãÈÑÑ.  • ÕÚæÈÉ ÇáÈáÚ Ãæ ÇáÊÃáã ÚäÏ ÇáÈáÚ.  • ÇáÅÕÇÈÉ ÈÃÒíÒ Ãæ ÓÚÇá ãÓÊãÑ.  • ÚÏã ÇáÔÚæÑ ÈÊÍÓä ãÚ ÊáÞí ÇáÚáÇÌ.  • ÇáÅÕÇÈÉ ÈÍÑÞÉ ÇáãÚÏÉ Úáì äÍæ ãÊßÑÑ áÃßËÑ ãä ÃÓÈæÚíä.  íÊÚíä ØáÈ ÇáãÓÇÚÏÉ Úáì ÇáÝæÑ Ýí ÇáÍÇáÇÊ ÇáÊÇáíÉ:  • ÇáÔÚæÑ ÈÃáã Ýí ÇáÐÑÇÚíä Ãæ ÇáÚäÞ Ãæ ÇáÝßíä Ãæ ÇáÃÓäÇä Ãæ ÇáÙåÑ.  • ÇáÊÚÑÞ Ãæ ÇáÔÚæÑ ÈÏæÇÑ Ãæ ÏæÎÉ.  • ÇáÅÕÇÈÉ ÈÖíÞ Ýí ÇáÊäÝÓ Ãæ Ãáã ÈÇáÕÏÑ.  • ÇáÊÞíÄ ãÚ ÊÔÇÈå ÇáÞíÁ ÈÇáÏã Ãæ ÍÈíÈÇÊ ÇáÞåæÉ.  • ÇÎÊáÇØ ÇáÈÑÇÒ ÈÇáÏã Ãæ íßæä áæäå ÃÓæÏðÇ.  ÑÈãÇ ÊãËá åÐå ÇáÃÚÑÇÖ ãÔßáÉ ÎØíÑÉ ÊÔßøá ÍÇáÉ ØÈíÉ ØÇÑÆÉ. íÊÚíä ÚÏã ÇäÊÙÇÑ ãÇ ÅÐÇ ßÇäÊ ÇáÃÚÑÇÖ ÓÊÒæá Ãã áÇ. æíÌÈ ÇáÍÕæá Úáì ÇáãÓÇÚÏÉ ÇáØÈíÉ Úáì ÇáÝæÑ. ÇÊÕá ÈÎÏãÇÊ ÇáØæÇÑÆ ÇáãÍáíÉ (911 Ýí ÇáæáÇíÇÊ ÇáãÊÍÏÉ). ÊÌäÈ ÇáÞíÇÏÉ ÈäÝÓß Åáì ÇáãÓÊÔÝì.  ãáÎÕ  • ÍÑÞÉ ÇáãÚÏÉ åí Ãáã Ãæ ÅÒÚÇÌ ÞÏ íÕíÈ ÇáÍáÞ Ãæ ÇáÕÏÑ. æÚÇÏÉ ãÇ íæÕÝ ÈÃäå Ãáã ÍÇÑÞ. ßãÇ Ãäå ÞÏ íÓÈÈ ãÐÇÞðÇ ÍãÖíðÇ ÓíÆðÇ Ýí ÇáÝã.  • ÊÌäÈ ÊäÇæá ÇáÃØÚãÉ æÇáãÔÑæÈÇÊ ÇáÊí íæÕí ãõÞÏøöã ÇáÑÚÇíÉ ÇáÕÍíÉ ÇáãÊÇÈÚ áß ÈÊÌäÈåÇ.  • íÊÚíä ÊäÇæá  ÇáÃÏæíÉ ÇáÊí ÊõÕÑÝ ÈæÕÝÉ ØÈíÉ Ãæ Ïæä æÕÝÉ ØÈíÉ æÝÞðÇ áãÇ íÎÈÑßö Èå ãÞÏã ÇáÑÚÇíÉ ÇáÕÍíÉ ÇáÎÇÕ Èß. íÊÚíä ÚÏã ÊäÇæá ÇáÃÓÈÑíä Ãæ ÇáÃíÈæÈÑæÝíä Ãæ ÛíÑå ãä ãÖÇÏÇÊ ÇáÇáÊåÇÈÇÊ ÇááÇÓÊíÑæíÏíÉ ÅáÇ ÈãæÇÝÞÉ ãÞÏã ÇáÑÚÇíÉ.  • íÌÈ ÇáÇÊÕÇá ÈãõÞÏøöã ÇáÑÚÇíÉ ÇáÕÍíÉ ÅÐÇ áã ÊÊÍÓä ÇáÃÚÑÇÖ Ãæ ÅÐÇ ÊÝÇÞãÊ.  áíÓ ÇáåÏÝ ãä åÐå ÇáãÚáæãÇÊ Ãä Êßæä ÈÏíáÇð ááÅÑÔÇÏÇÊ ÇáÊí íÞÏãåÇ ãæÝÑ ÇáÑÚÇíÉ ÇáÕÍíÉ. ÊÃßÏ ãä ãäÇÞÔÉ ÃíÉ ÃÓÆáÉ ÊÏæÑ Ýí Ðåäß ãÚ ãæÝÑ ÇáÑÚÇíÉ ÇáÕÍíÉ.þ  Document Released: 04/10/2017 Document Revised: 06/27/2019 Document Reviewed: 06/27/2019  [employer]vier Interactive Patient Education © 2020 [employer]

## 2020-03-05 NOTE — PROGRESS NOTES
Chief Complaint   Patient presents with   • Heartburn       Subjective    Cy Blair is a 59 y.o. male. he is here today for follow-up.    59-year-old male presents to discuss reflux.  He was previously seen for same symptoms scheduled for EGD but canceled procedure.  Reports he has follow-up with cardiologist tomorrow and is concerned about a blood pressure dropping during anesthesia and is going to first follow-up with cardiology before undergoing sedation for any procedure.  States he still does not eat as well as he should typically eats snacks such as cheese, chips, 5-hour energy as needed to get through night shift at work.  Symptoms seem to be worsening especially with intake.  He has been on Prilosec for some time.  In the past he has tried Nexium and Prevacid.    Heartburn   He complains of abdominal pain, heartburn and nausea. He reports no belching, no chest pain, no choking, no coughing, no dysphagia, no early satiety, no globus sensation, no hoarse voice or no sore throat. This is a chronic problem. The current episode started more than 1 year ago. The problem occurs frequently. The problem has been waxing and waning. The heartburn is located in the substernum. The heartburn is of moderate intensity. The heartburn wakes him from sleep. The heartburn limits his activity. The heartburn changes with position. The symptoms are aggravated by certain foods. Associated symptoms include fatigue. He has tried a PPI for the symptoms. The treatment provided mild relief.            The following portions of the patient's history were reviewed and updated as appropriate:   Past Medical History:   Diagnosis Date   • Candidiasis of skin     Candidiasis of skin - has flareups during the summer   • Essential (primary) hypertension      Past Surgical History:   Procedure Laterality Date   • KIDNEY STONE SURGERY     • NECK SURGERY      for herniated discs and spinal stenosis     Family History   Problem Relation Age of  Onset   • Heart attack Mother 81        POSSIBLE MI   • Heart disease Father    • Sudden death Other    • Hypertension Brother    • Dementia Maternal Grandmother    • No Known Problems Maternal Grandfather    • No Known Problems Paternal Grandmother    • Clotting disorder Paternal Grandfather        Prior to Admission medications    Medication Sig Start Date End Date Taking? Authorizing Provider   aspirin 81 MG chewable tablet Chew 81 mg Daily.   Yes Fernando Saldivar MD   cefdinir (OMNICEF) 300 MG capsule Take 1 capsule by mouth 2 (Two) Times a Day. 3/21/19  Yes Lakesha Lincoln APRN   lisinopril (PRINIVIL,ZESTRIL) 40 MG tablet TAKE 1 TABLET DAILY 9/24/19  Yes Sarah Gilbert APRN   losartan-hydrochlorothiazide (HYZAAR) 100-12.5 MG per tablet TAKE 1 TABLET DAILY 2/27/20  Yes Sarah Gilbert APRN   Omega-3 Fatty Acids (FISH OIL BURP-LESS) 1200 MG capsule Take  by mouth.   Yes ProviderFernando MD   omeprazole (priLOSEC) 20 MG capsule Take 20 mg by mouth Daily.   Yes ProviderFernando MD   Iuqrsu-NeAjx-AhOhzv-FA-Omega (MULTIVITAMIN/MINERALS PO) Med Name: multivitamin & minerals #11-folic acid oral   Yes ProviderFernando MD   senna-docusate (SENOKOT S) 8.6-50 MG per tablet 1-2 tablets Daily.   Yes ProviderFernando MD   vitamin C (ASCORBIC ACID) 500 MG tablet Take 500 mg by mouth Daily.   Yes Emergency, Nurse Chioma, RN     No Known Allergies  Social History     Socioeconomic History   • Marital status: Unknown     Spouse name: Not on file   • Number of children: Not on file   • Years of education: Not on file   • Highest education level: Not on file   Tobacco Use   • Smoking status: Never Smoker   • Smokeless tobacco: Never Used   Substance and Sexual Activity   • Alcohol use: No   • Drug use: No   • Sexual activity: Defer       Review of Systems  Review of Systems   Constitutional: Positive for fatigue. Negative for activity change, appetite change, chills, diaphoresis, fever and  "unexpected weight change.   HENT: Negative for hoarse voice, sore throat and trouble swallowing.    Respiratory: Negative for cough, choking and shortness of breath.    Cardiovascular: Negative for chest pain.   Gastrointestinal: Positive for abdominal distention (bloated ), abdominal pain, heartburn and nausea. Negative for anal bleeding, blood in stool, constipation, diarrhea, dysphagia, rectal pain and vomiting.   Musculoskeletal: Negative for arthralgias.   Skin: Negative for pallor.   Neurological: Negative for light-headedness.        /88 (BP Location: Left arm)   Pulse 67   Ht 182.9 cm (72\")   Wt 126 kg (278 lb)   BMI 37.70 kg/m²     Objective    Physical Exam   Constitutional: He is oriented to person, place, and time. He appears well-developed and well-nourished. He is cooperative. No distress.   HENT:   Head: Normocephalic and atraumatic.   Neck: Normal range of motion. Neck supple. No thyromegaly present.   Cardiovascular: Normal rate, regular rhythm and normal heart sounds.   Pulmonary/Chest: Effort normal and breath sounds normal. He has no wheezes. He has no rhonchi. He has no rales.   Abdominal: Soft. Normal appearance and bowel sounds are normal. He exhibits no distension. There is no hepatosplenomegaly. There is tenderness in the epigastric area. There is no rigidity and no guarding. No hernia.   Lymphadenopathy:     He has no cervical adenopathy.   Neurological: He is alert and oriented to person, place, and time.   Skin: Skin is warm, dry and intact. No rash noted. No pallor.   Psychiatric: He has a normal mood and affect. His speech is normal.     Lab on 12/20/2019   Component Date Value Ref Range Status   • Color, UA 12/20/2019 Dark Yellow* Yellow, Straw Final   • Appearance, UA 12/20/2019 Slightly Cloudy* Clear Final   • pH, UA 12/20/2019 6.0  5.5 - 8.0 Final   • Specific Gravity, UA 12/20/2019 1.015  1.005 - 1.030 Final   • Glucose, UA 12/20/2019 Negative  Negative Final   • Ketones, " UA 12/20/2019 Trace* Negative Final   • Bilirubin, UA 12/20/2019 Negative  Negative Final   • Blood, UA 12/20/2019 Negative  Negative Final   • Protein, UA 12/20/2019 Negative  Negative Final   • Leuk Esterase, UA 12/20/2019 Negative  Negative Final   • Nitrite, UA 12/20/2019 Negative  Negative Final   • Urobilinogen, UA 12/20/2019 1.0 E.U./dL  0.2 - 1.0 E.U./dL Final   • Glucose 12/20/2019 98  70 - 99 mg/dL Final   • BUN 12/20/2019 15  7 - 23 mg/dL Final   • Creatinine 12/20/2019 1.10  0.70 - 1.30 mg/dL Final   • Sodium 12/20/2019 140  137 - 145 mmol/L Final   • Potassium 12/20/2019 3.8  3.4 - 5.0 mmol/L Final   • Chloride 12/20/2019 101  101 - 112 mmol/L Final   • CO2 12/20/2019 30.0  22.0 - 30.0 mmol/L Final   • Calcium 12/20/2019 10.2  8.4 - 10.2 mg/dL Final   • Total Protein 12/20/2019 8.3  6.3 - 8.6 g/dL Final   • Albumin 12/20/2019 4.90  3.50 - 5.00 g/dL Final   • ALT (SGPT) 12/20/2019 19  <=50 U/L Final   • AST (SGOT) 12/20/2019 23  17 - 59 U/L Final   • Alkaline Phosphatase 12/20/2019 79  38 - 126 U/L Final   • Total Bilirubin 12/20/2019 1.2  0.2 - 1.3 mg/dL Final   • eGFR Non  Amer 12/20/2019 68  49 - 113 mL/min/1.73 Final   • Globulin 12/20/2019 3.4  2.3 - 3.5 gm/dL Final   • A/G Ratio 12/20/2019 1.4  1.1 - 1.8 g/dL Final   • BUN/Creatinine Ratio 12/20/2019 13.6  7.0 - 25.0 Final   • Anion Gap 12/20/2019 9.0  5.0 - 15.0 mmol/L Final   • Hemoglobin A1C 12/20/2019 5.29  4.80 - 5.60 % Final   • Free T4 12/20/2019 1.52  0.93 - 1.70 ng/dL Final   • TSH 12/20/2019 1.760  0.270 - 4.200 uIU/mL Final   • Vitamin B-12 12/20/2019 952* 211 - 946 pg/mL Final   • 25 Hydroxy, Vitamin D 12/20/2019 38.1  30.0 - 100.0 ng/ml Final   • Total Cholesterol 12/20/2019 150  150 - 200 mg/dL Final   • Triglycerides 12/20/2019 97  <=150 mg/dL Final   • HDL Cholesterol 12/20/2019 42  40 - 59 mg/dL Final   • LDL Cholesterol  12/20/2019 89  <=100 mg/dL Final   • VLDL Cholesterol 12/20/2019 19.4  mg/dL Final   • LDL/HDL Ratio  12/20/2019 2.11  0.00 - 3.55 Final   • PSA 12/20/2019 0.180  0.000 - 4.000 ng/mL Final   • WBC 12/20/2019 7.16  3.40 - 10.80 10*3/mm3 Final   • RBC 12/20/2019 5.19  4.14 - 5.80 10*6/mm3 Final   • Hemoglobin 12/20/2019 16.0  13.0 - 17.7 g/dL Final   • Hematocrit 12/20/2019 44.2  37.5 - 51.0 % Final   • MCV 12/20/2019 85.2  79.0 - 97.0 fL Final   • MCH 12/20/2019 30.8  26.6 - 33.0 pg Final   • MCHC 12/20/2019 36.2* 31.5 - 35.7 g/dL Final   • RDW 12/20/2019 12.8  12.3 - 15.4 % Final   • RDW-SD 12/20/2019 39.8  37.0 - 54.0 fl Final   • MPV 12/20/2019 10.5  6.0 - 12.0 fL Final   • Platelets 12/20/2019 226  140 - 450 10*3/mm3 Final   • Neutrophil % 12/20/2019 64.3  42.7 - 76.0 % Final   • Lymphocyte % 12/20/2019 25.7  19.6 - 45.3 % Final   • Monocyte % 12/20/2019 8.5  5.0 - 12.0 % Final   • Eosinophil % 12/20/2019 1.1  0.3 - 6.2 % Final   • Basophil % 12/20/2019 0.4  0.0 - 1.5 % Final   • Neutrophils, Absolute 12/20/2019 4.60  1.70 - 7.00 10*3/mm3 Final   • Lymphocytes, Absolute 12/20/2019 1.84  0.70 - 3.10 10*3/mm3 Final   • Monocytes, Absolute 12/20/2019 0.61  0.10 - 0.90 10*3/mm3 Final   • Eosinophils, Absolute 12/20/2019 0.08  0.00 - 0.40 10*3/mm3 Final   • Basophils, Absolute 12/20/2019 0.03  0.00 - 0.20 10*3/mm3 Final     Assessment/Plan      1. Gastroesophageal reflux disease with esophagitis    2. Bloating    .   Schedule patient for upper GI series with small bowel follow-through to further evaluate upper abdominal, periumbilical abdominal pain along with chronic reflux.  Discussed with patient may need to plan EGD after x-ray and he verbalized understanding.  Continue PPI daily have offered to increase dose but he declined.  He will obtain Gaviscon over-the-counter for breakthrough symptoms     Orders placed during this encounter include:  Orders Placed This Encounter   Procedures   • FL Upper GI With Air Contrast & SBFT     Standing Status:   Future     Standing Expiration Date:   3/5/2021     Order Specific  Question:   Reason for Exam:     Answer:   GERD, Nausea       * Surgery not found *    Review and/or summary of lab tests, radiology, procedures, medications. Review and summary of old records and obtaining of history. The risks and benefits of my recommendations, as well as other treatment options were discussed with the patient today. Questions were answered.    No orders of the defined types were placed in this encounter.      Follow-up: Return in about 4 weeks (around 4/2/2020) for After test.          This document has been electronically signed by MARTINA Montelongo on March 5, 2020 2:58 PM             Results for orders placed or performed in visit on 12/20/19   PSA Screen   Result Value Ref Range    PSA 0.180 0.000 - 4.000 ng/mL   Urinalysis With Culture If Indicated - Urine, Clean Catch   Result Value Ref Range    Color, UA Dark Yellow (A) Yellow, Straw    Appearance, UA Slightly Cloudy (A) Clear    pH, UA 6.0 5.5 - 8.0    Specific Gravity, UA 1.015 1.005 - 1.030    Glucose, UA Negative Negative    Ketones, UA Trace (A) Negative    Bilirubin, UA Negative Negative    Blood, UA Negative Negative    Protein, UA Negative Negative    Leuk Esterase, UA Negative Negative    Nitrite, UA Negative Negative    Urobilinogen, UA 1.0 E.U./dL 0.2 - 1.0 E.U./dL   CBC Auto Differential   Result Value Ref Range    WBC 7.16 3.40 - 10.80 10*3/mm3    RBC 5.19 4.14 - 5.80 10*6/mm3    Hemoglobin 16.0 13.0 - 17.7 g/dL    Hematocrit 44.2 37.5 - 51.0 %    MCV 85.2 79.0 - 97.0 fL    MCH 30.8 26.6 - 33.0 pg    MCHC 36.2 (H) 31.5 - 35.7 g/dL    RDW 12.8 12.3 - 15.4 %    RDW-SD 39.8 37.0 - 54.0 fl    MPV 10.5 6.0 - 12.0 fL    Platelets 226 140 - 450 10*3/mm3    Neutrophil % 64.3 42.7 - 76.0 %    Lymphocyte % 25.7 19.6 - 45.3 %    Monocyte % 8.5 5.0 - 12.0 %    Eosinophil % 1.1 0.3 - 6.2 %    Basophil % 0.4 0.0 - 1.5 %    Neutrophils, Absolute 4.60 1.70 - 7.00 10*3/mm3    Lymphocytes, Absolute 1.84 0.70 - 3.10 10*3/mm3    Monocytes,  Absolute 0.61 0.10 - 0.90 10*3/mm3    Eosinophils, Absolute 0.08 0.00 - 0.40 10*3/mm3    Basophils, Absolute 0.03 0.00 - 0.20 10*3/mm3   Vitamin D 25 Hydroxy   Result Value Ref Range    25 Hydroxy, Vitamin D 38.1 30.0 - 100.0 ng/ml   TSH   Result Value Ref Range    TSH 1.760 0.270 - 4.200 uIU/mL   T4, Free   Result Value Ref Range    Free T4 1.52 0.93 - 1.70 ng/dL   Hemoglobin A1c   Result Value Ref Range    Hemoglobin A1C 5.29 4.80 - 5.60 %   Vitamin B12   Result Value Ref Range    Vitamin B-12 952 (H) 211 - 946 pg/mL   Lipid Panel   Result Value Ref Range    Total Cholesterol 150 150 - 200 mg/dL    Triglycerides 97 <=150 mg/dL    HDL Cholesterol 42 40 - 59 mg/dL    LDL Cholesterol  89 <=100 mg/dL    VLDL Cholesterol 19.4 mg/dL    LDL/HDL Ratio 2.11 0.00 - 3.55   Comprehensive Metabolic Panel   Result Value Ref Range    Glucose 98 70 - 99 mg/dL    BUN 15 7 - 23 mg/dL    Creatinine 1.10 0.70 - 1.30 mg/dL    Sodium 140 137 - 145 mmol/L    Potassium 3.8 3.4 - 5.0 mmol/L    Chloride 101 101 - 112 mmol/L    CO2 30.0 22.0 - 30.0 mmol/L    Calcium 10.2 8.4 - 10.2 mg/dL    Total Protein 8.3 6.3 - 8.6 g/dL    Albumin 4.90 3.50 - 5.00 g/dL    ALT (SGPT) 19 <=50 U/L    AST (SGOT) 23 17 - 59 U/L    Alkaline Phosphatase 79 38 - 126 U/L    Total Bilirubin 1.2 0.2 - 1.3 mg/dL    eGFR Non  Amer 68 49 - 113 mL/min/1.73    Globulin 3.4 2.3 - 3.5 gm/dL    A/G Ratio 1.4 1.1 - 1.8 g/dL    BUN/Creatinine Ratio 13.6 7.0 - 25.0    Anion Gap 9.0 5.0 - 15.0 mmol/L   Results for orders placed or performed in visit on 06/28/18   Hemoglobin A1c   Result Value Ref Range    Hemoglobin A1C 5.2 4 - 5.6 %   Lipid Panel   Result Value Ref Range    Total Cholesterol 138 (L) 150 - 200 mg/dL    Triglycerides 110 <=150 mg/dL    HDL Cholesterol 39 (L) 40 - 59 mg/dL    LDL Cholesterol  77 <=100 mg/dL    VLDL Cholesterol 22 mg/dL    LDL/HDL Ratio 1.97 0.00 - 3.55   Results for orders placed or performed in visit on 04/25/18   Basic Metabolic Panel    Result Value Ref Range    Glucose 102 (H) 74 - 99 mg/dL    BUN 17 9 - 20 mg/dL    Creatinine 0.99 0.66 - 1.25 mg/dL    Sodium 141 137 - 145 mmol/L    Potassium 4.4 3.4 - 5.0 mmol/L    Chloride 103 98 - 107 mmol/L    CO2 27.0 22.0 - 30.0 mmol/L    Calcium 9.7 8.4 - 10.2 mg/dL    eGFR Non  Amer 77 49 - 113 mL/min/1.73    BUN/Creatinine Ratio 17.2 7.0 - 25.0    Anion Gap 11.0 5.0 - 15.0 mmol/L   Results for orders placed or performed during the hospital encounter of 02/19/18   POCT Influenza A/B   Result Value Ref Range    Rapid Influenza A Ag neg     Rapid Influenza B Ag pos     Internal Control Passed Passed    Lot Number [id number]     Expiration Date 3/20    Results for orders placed or performed in visit on 09/13/17   CBC Auto Differential   Result Value Ref Range    WBC 4.91 3.20 - 9.80 10*3/mm3    RBC 4.97 4.37 - 5.74 10*6/mm3    Hemoglobin 15.5 13.7 - 17.3 g/dL    Hematocrit 43.6 39.0 - 49.0 %    MCV 87.7 80.0 - 98.0 fL    MCH 31.2 26.5 - 34.0 pg    MCHC 35.6 31.5 - 36.3 g/dL    RDW 13.0 11.5 - 14.5 %    RDW-SD 40.4 35.1 - 43.9 fl    MPV 10.9 8.0 - 12.0 fL    Platelets 216 150 - 450 10*3/mm3    Neutrophil % 58.4 37.0 - 80.0 %    Lymphocyte % 28.7 10.0 - 50.0 %    Monocyte % 9.4 0.0 - 12.0 %    Eosinophil % 2.9 0.0 - 7.0 %    Basophil % 0.6 0.0 - 2.0 %    Neutrophils, Absolute 2.87 2.00 - 8.60 10*3/mm3    Lymphocytes, Absolute 1.41 0.60 - 4.20 10*3/mm3    Monocytes, Absolute 0.46 0.00 - 0.90 10*3/mm3    Eosinophils, Absolute 0.14 0.00 - 0.70 10*3/mm3    Basophils, Absolute 0.03 0.00 - 0.20 10*3/mm3     *Note: Due to a large number of results and/or encounters for the requested time period, some results have not been displayed. A complete set of results can be found in Results Review.

## 2020-03-06 ENCOUNTER — OFFICE VISIT (OUTPATIENT)
Dept: CARDIOLOGY | Facility: CLINIC | Age: 60
End: 2020-03-06

## 2020-03-06 VITALS
OXYGEN SATURATION: 98 % | SYSTOLIC BLOOD PRESSURE: 146 MMHG | HEART RATE: 61 BPM | DIASTOLIC BLOOD PRESSURE: 88 MMHG | BODY MASS INDEX: 37.87 KG/M2 | WEIGHT: 279.6 LBS | HEIGHT: 72 IN

## 2020-03-06 DIAGNOSIS — R00.2 PALPITATIONS: Primary | ICD-10-CM

## 2020-03-06 DIAGNOSIS — R55 SYNCOPE AND COLLAPSE: ICD-10-CM

## 2020-03-06 DIAGNOSIS — Z00.00 GENERAL MEDICAL EXAM: Primary | ICD-10-CM

## 2020-03-06 PROCEDURE — 93000 ELECTROCARDIOGRAM COMPLETE: CPT | Performed by: INTERNAL MEDICINE

## 2020-03-06 PROCEDURE — 99204 OFFICE O/P NEW MOD 45 MIN: CPT | Performed by: INTERNAL MEDICINE

## 2020-03-06 NOTE — PROGRESS NOTES
[facility] Cardiology  OFFICE CONSULT NOTE    Patient Name: Cy Blair  Age/Sex: 59 y.o. male  : 1960  MRN: [id number]      Referring Provider: Robyn Dennis APRN  [address] [place] [address] [address]  [place] [address]        Chief Complaint: Syncope and palpitations    History of Present Illness:  Cy Blair is a 59 y.o. male 2 distinct episodes of early passing out.  One was at work when he was setting down.  He states that is usually cold where he works and he was since high on a break setting down.  Been setting down 15 minutes when he got lightheaded felt like squiggly's before his eyes and he was told that he was kind got wide.  He felt as if he was going to pass out but did not.  He was also a little sweaty from this.  He had done good and actually cancel appointment that was originally scheduled for this.  Then about 2 weeks ago he had been having some constipation and took a laxative.  He had to go to the toilet and at first there was constipation and then he had diarrhea.  Then of that he said he had a similar feeling without the diaphoresis.    His feel like at times his heart is racing.  And he notices it somewhat this.  He denies any chest pain or chest pressure.  He denies any exertional chest pain or shortness of air.    Last Echo: None    Last Cath: None      Past Medical History:  Past Medical History:   Diagnosis Date   • Candidiasis of skin     Candidiasis of skin - has flareups during the summer   • Essential (primary) hypertension        PAST SURGERY   Past Surgical History:   Procedure Laterality Date   • KIDNEY STONE SURGERY     • NECK SURGERY      for herniated discs and spinal stenosis       Home Medications:      Current Outpatient Medications:   •  aspirin 81 MG chewable tablet, Chew 81 mg Daily., Disp: , Rfl:   •  lisinopril (PRINIVIL,ZESTRIL) 40 MG tablet, TAKE 1 TABLET DAILY, Disp: 90 tablet, Rfl: 1  •  losartan-hydrochlorothiazide (HYZAAR) 100-12.5 MG per tablet,  TAKE 1 TABLET DAILY, Disp: 90 tablet, Rfl: 1  •  Omega-3 Fatty Acids (FISH OIL BURP-LESS) 1200 MG capsule, Take  by mouth., Disp: , Rfl:   •  omeprazole (priLOSEC) 20 MG capsule, Take 20 mg by mouth Daily., Disp: , Rfl:   •  Jfdywa-MvRfx-KpEuik-FA-Omega (MULTIVITAMIN/MINERALS PO), Med Name: multivitamin & minerals #11-folic acid oral, Disp: , Rfl:   •  senna-docusate (SENOKOT S) 8.6-50 MG per tablet, 1-2 tablets Daily., Disp: , Rfl:   •  vitamin C (ASCORBIC ACID) 500 MG tablet, Take 500 mg by mouth Daily., Disp: , Rfl:     Allergies:  No Known Allergies     Social History:  Social History     Socioeconomic History   • Marital status: Unknown     Spouse name: Not on file   • Number of children: Not on file   • Years of education: Not on file   • Highest education level: Not on file   Tobacco Use   • Smoking status: Never Smoker   • Smokeless tobacco: Never Used   Substance and Sexual Activity   • Alcohol use: No   • Drug use: No   • Sexual activity: Defer        Family History:  Family History   Problem Relation Age of Onset   • Heart attack Mother 81        POSSIBLE MI   • Heart disease Father    • Sudden death Other    • Hypertension Brother    • Dementia Maternal Grandmother    • No Known Problems Maternal Grandfather    • No Known Problems Paternal Grandmother    • Clotting disorder Paternal Grandfather          Review of Systems:   Constitution: No chills, no rigors, no unexplained weight loss or weight gain    Eyes:  No diplopia, no blurred vision, no loss of vision, conjunctiva is pink and sclera is anicteric    ENT:  No tinnitus, no otorrhea, no epistaxis, no sore throat   Respiratory: No cough, no hemoptysis    Cardiovascular:  As mentioned in HPI    Gastrointestinal: No nausea, no vomiting, no hematemesis, no diarrhea or constipation, no melena    Genitourinary: No frequency of dysuria no hematuria    Integument: positive for  No pruritis and  no skin rash    Hematologic / Lymphatic: No excessive bleeding,  easy bruising, fatigue, lymphadenopathy and petechiae    Musculoskeletal: No joint pain, joint stiffness, joint swelling, muscle pain, muscle weakness and neck pain    Neurological: No dizziness, headaches, light headedness, seizures and vertigo or stroke    Behavioral/Psych: No depression, or bipolar disorder    Endocrine: no h/o diabetes, hyperlipidemia or thyroid problems        Vitals:    03/06/20 1009   BP: 146/88   Pulse: 61   SpO2: 98%       Body mass index is 37.92 kg/m².          Physical Exam:   General Appearance:    Alert, oriented, cooperative, in no acute distress     Head:    Normocephalic, atraumatic, without obvious abnormality     Eyes:            Lids and lashes normal, conjunctivae and sclerae normal, no   icterus, no pallor     Ears:    Ears appear intact with no abnormalities noted     Throat:   Mucous membranes pink and moist     Neck:   Supple, trachea midline, no carotid bruit, no JVD     Lungs:     Air enty equal,  Clear to auscultation, respirations regular, Unlabored. No wheezes, rales, rhonchi    Heart:    Regular rhythm and normal rate, normal S1 and S2, no            murmur, no gallop,      Abdomen:     Normal bowel sounds, no masses, liver and spleen nonpalpable, soft, non-tender, non-distended, no guarding, no rebound tenderness       Extremities:   Moves all extremities well, no edema, no cyanosis, no              Redness, no clubbing     Pulses:   Pulses palpable and equal bilaterally       Neurologic:   Alert and oriented to person, place, and time. No focal neurological deficits       Lab Review:     Lab on 12/20/2019   Component Date Value Ref Range Status   • Color, UA 12/20/2019 Dark Yellow* Yellow, Straw Final   • Appearance, UA 12/20/2019 Slightly Cloudy* Clear Final   • pH, UA 12/20/2019 6.0  5.5 - 8.0 Final   • Specific Gravity, UA 12/20/2019 1.015  1.005 - 1.030 Final   • Glucose, UA 12/20/2019 Negative  Negative Final   • Ketones, UA 12/20/2019 Trace* Negative Final   •  Bilirubin, UA 12/20/2019 Negative  Negative Final   • Blood, UA 12/20/2019 Negative  Negative Final   • Protein, UA 12/20/2019 Negative  Negative Final   • Leuk Esterase, UA 12/20/2019 Negative  Negative Final   • Nitrite, UA 12/20/2019 Negative  Negative Final   • Urobilinogen, UA 12/20/2019 1.0 E.U./dL  0.2 - 1.0 E.U./dL Final   • Glucose 12/20/2019 98  70 - 99 mg/dL Final   • BUN 12/20/2019 15  7 - 23 mg/dL Final   • Creatinine 12/20/2019 1.10  0.70 - 1.30 mg/dL Final   • Sodium 12/20/2019 140  137 - 145 mmol/L Final   • Potassium 12/20/2019 3.8  3.4 - 5.0 mmol/L Final   • Chloride 12/20/2019 101  101 - 112 mmol/L Final   • CO2 12/20/2019 30.0  22.0 - 30.0 mmol/L Final   • Calcium 12/20/2019 10.2  8.4 - 10.2 mg/dL Final   • Total Protein 12/20/2019 8.3  6.3 - 8.6 g/dL Final   • Albumin 12/20/2019 4.90  3.50 - 5.00 g/dL Final   • ALT (SGPT) 12/20/2019 19  <=50 U/L Final   • AST (SGOT) 12/20/2019 23  17 - 59 U/L Final   • Alkaline Phosphatase 12/20/2019 79  38 - 126 U/L Final   • Total Bilirubin 12/20/2019 1.2  0.2 - 1.3 mg/dL Final   • eGFR Non  Amer 12/20/2019 68  49 - 113 mL/min/1.73 Final   • Globulin 12/20/2019 3.4  2.3 - 3.5 gm/dL Final   • A/G Ratio 12/20/2019 1.4  1.1 - 1.8 g/dL Final   • BUN/Creatinine Ratio 12/20/2019 13.6  7.0 - 25.0 Final   • Anion Gap 12/20/2019 9.0  5.0 - 15.0 mmol/L Final   • Hemoglobin A1C 12/20/2019 5.29  4.80 - 5.60 % Final   • Free T4 12/20/2019 1.52  0.93 - 1.70 ng/dL Final   • TSH 12/20/2019 1.760  0.270 - 4.200 uIU/mL Final   • Vitamin B-12 12/20/2019 952* 211 - 946 pg/mL Final   • 25 Hydroxy, Vitamin D 12/20/2019 38.1  30.0 - 100.0 ng/ml Final   • Total Cholesterol 12/20/2019 150  150 - 200 mg/dL Final   • Triglycerides 12/20/2019 97  <=150 mg/dL Final   • HDL Cholesterol 12/20/2019 42  40 - 59 mg/dL Final   • LDL Cholesterol  12/20/2019 89  <=100 mg/dL Final   • VLDL Cholesterol 12/20/2019 19.4  mg/dL Final   • LDL/HDL Ratio 12/20/2019 2.11  0.00 - 3.55 Final   • PSA  12/20/2019 0.180  0.000 - 4.000 ng/mL Final   • WBC 12/20/2019 7.16  3.40 - 10.80 10*3/mm3 Final   • RBC 12/20/2019 5.19  4.14 - 5.80 10*6/mm3 Final   • Hemoglobin 12/20/2019 16.0  13.0 - 17.7 g/dL Final   • Hematocrit 12/20/2019 44.2  37.5 - 51.0 % Final   • MCV 12/20/2019 85.2  79.0 - 97.0 fL Final   • MCH 12/20/2019 30.8  26.6 - 33.0 pg Final   • MCHC 12/20/2019 36.2* 31.5 - 35.7 g/dL Final   • RDW 12/20/2019 12.8  12.3 - 15.4 % Final   • RDW-SD 12/20/2019 39.8  37.0 - 54.0 fl Final   • MPV 12/20/2019 10.5  6.0 - 12.0 fL Final   • Platelets 12/20/2019 226  140 - 450 10*3/mm3 Final   • Neutrophil % 12/20/2019 64.3  42.7 - 76.0 % Final   • Lymphocyte % 12/20/2019 25.7  19.6 - 45.3 % Final   • Monocyte % 12/20/2019 8.5  5.0 - 12.0 % Final   • Eosinophil % 12/20/2019 1.1  0.3 - 6.2 % Final   • Basophil % 12/20/2019 0.4  0.0 - 1.5 % Final   • Neutrophils, Absolute 12/20/2019 4.60  1.70 - 7.00 10*3/mm3 Final   • Lymphocytes, Absolute 12/20/2019 1.84  0.70 - 3.10 10*3/mm3 Final   • Monocytes, Absolute 12/20/2019 0.61  0.10 - 0.90 10*3/mm3 Final   • Eosinophils, Absolute 12/20/2019 0.08  0.00 - 0.40 10*3/mm3 Final   • Basophils, Absolute 12/20/2019 0.03  0.00 - 0.20 10*3/mm3 Final   ]    Assessment/Plan     1. Palpitations  Patient did have some palpitations both with this episode and sometimes without.  He did not want the monitor put on today due to his work so he is going come back for the monitor on the 20th for that.  We will also assess his LV function and valvular structures.  - Adult Transthoracic Echo Complete W/ Cont if Necessary Per Protocol  - Holter Monitor - 72 Hour Up To 21 Days; Future    2. Syncope and collapse  He did have some near syncope.  The second episode probably was related to his bowel movement and the constipation and straining and then the diarrhea.  The first 1 is unsure.  We will go ahead and do the test as mentioned.  These are unremarkable possibly a tilt table test may need to be  done.  - Adult Transthoracic Echo Complete W/ Cont if Necessary Per Protocol  - Holter Monitor - 72 Hour Up To 21 Days; Future        Return in about 3 months (around 6/6/2020).

## 2020-03-10 ENCOUNTER — HOSPITAL ENCOUNTER (OUTPATIENT)
Dept: GENERAL RADIOLOGY | Facility: HOSPITAL | Age: 60
Discharge: HOME | End: 2020-03-10
Admitting: NURSE PRACTITIONER

## 2020-03-10 DIAGNOSIS — K21.00 GASTROESOPHAGEAL REFLUX DISEASE WITH ESOPHAGITIS: ICD-10-CM

## 2020-03-10 PROCEDURE — 74246 X-RAY XM UPR GI TRC 2CNTRST: CPT

## 2020-03-10 PROCEDURE — 74248 X-RAY SM INT F-THRU STD: CPT

## 2020-03-10 RX ADMIN — BARIUM SULFATE 240 ML: 960 POWDER, FOR SUSPENSION ORAL at 09:04

## 2020-03-18 ENCOUNTER — OFFICE VISIT (OUTPATIENT)
Dept: GASTROENTEROLOGY | Facility: CLINIC | Age: 60
End: 2020-03-18

## 2020-03-18 ENCOUNTER — APPOINTMENT (OUTPATIENT)
Dept: LAB | Facility: HOSPITAL | Age: 60
End: 2020-03-18

## 2020-03-18 VITALS
DIASTOLIC BLOOD PRESSURE: 88 MMHG | SYSTOLIC BLOOD PRESSURE: 142 MMHG | HEIGHT: 72 IN | WEIGHT: 280 LBS | BODY MASS INDEX: 37.93 KG/M2 | HEART RATE: 86 BPM

## 2020-03-18 DIAGNOSIS — K21.9 GASTROESOPHAGEAL REFLUX DISEASE, ESOPHAGITIS PRESENCE NOT SPECIFIED: Primary | ICD-10-CM

## 2020-03-18 PROCEDURE — 86677 HELICOBACTER PYLORI ANTIBODY: CPT | Performed by: NURSE PRACTITIONER

## 2020-03-18 PROCEDURE — 99213 OFFICE O/P EST LOW 20 MIN: CPT | Performed by: NURSE PRACTITIONER

## 2020-03-18 PROCEDURE — 36415 COLL VENOUS BLD VENIPUNCTURE: CPT | Performed by: NURSE PRACTITIONER

## 2020-03-18 RX ORDER — PANTOPRAZOLE SODIUM 40 MG/1
40 TABLET, DELAYED RELEASE ORAL DAILY
Qty: 30 TABLET | Refills: 5 | Status: SHIPPED | OUTPATIENT
Start: 2020-03-18 | End: 2020-04-15

## 2020-03-18 NOTE — PATIENT INSTRUCTIONS
Heartburn  Heartburn is a type of pain or discomfort that can happen in the throat or chest. It is often described as a burning pain. It may also cause a bad, acid-like taste in the mouth. Heartburn may feel worse when you lie down or bend over, and it is often worse at night. Heartburn may be caused by stomach contents that move back up into the esophagus (reflux).  Follow these instructions at home:  Eating and drinking    · Avoid certain foods and drinks as told by your health care provider. This may include:  ? Coffee and tea (with or without caffeine).  ? Drinks that contain alcohol.  ? Energy drinks and sports drinks.  ? Carbonated drinks or sodas.  ? Chocolate and cocoa.  ? Peppermint and mint flavorings.  ? Garlic and onions.  ? Horseradish.  ? Spicy and acidic foods, including peppers, chili powder, jaffe powder, vinegar, hot sauces, and barbecue sauce.  ? Citrus fruit juices and citrus fruits, such as oranges, diego, and limes.  ? Tomato-based foods, such as red sauce, chili, salsa, and pizza with red sauce.  ? Fried and fatty foods, such as donuts, french fries, potato chips, and high-fat dressings.  ? High-fat meats, such as hot dogs and fatty cuts of red and white meats, such as rib eye steak, sausage, ham, and breaux.  ? High-fat dairy items, such as whole milk, butter, and cream cheese.  · Eat small, frequent meals instead of large meals.  · Avoid drinking large amounts of liquid with your meals.  · Avoid eating meals during the 2-3 hours before bedtime.  · Avoid lying down right after you eat.  · Do not exercise right after you eat.  Lifestyle         · If you are overweight, reduce your weight to an amount that is healthy for you. Ask your health care provider for guidance about a safe weight loss goal.  · Do not use any products that contain nicotine or tobacco, such as cigarettes, e-cigarettes, and chewing tobacco. These can make your symptoms worse. If you need help quitting, ask your health care  provider.  · Wear loose-fitting clothing. Do not wear anything tight around your waist that causes pressure on your abdomen.  · Raise (elevate) the head of your bed about 6 inches (15 cm) when you sleep.  · Try to reduce your stress, such as with yoga or meditation. If you need help reducing stress, ask your health care provider.  General instructions  · Pay attention to any changes in your symptoms.  · Take over-the-counter and prescription medicines only as told by your health care provider.  ? Do not take aspirin, ibuprofen, or other NSAIDs unless your health care provider told you to do so.  ? Stop medicines only as told by your health care provider. If you stop taking some medicines too quickly, your symptoms may get worse.  · Keep all follow-up visits as told by your health care provider. This is important.  Contact a health care provider if:  · You have new symptoms.  · You have unexplained weight loss.  · You have difficulty swallowing, or it hurts to swallow.  · You have wheezing or a persistent cough.  · Your symptoms do not improve with treatment.  · You have frequent heartburn for more than 2 weeks.  Get help right away if:  · You have pain in your arms, neck, jaw, teeth, or back.  · You feel sweaty, dizzy, or light-headed.  · You have chest pain or shortness of breath.  · You vomit and your vomit looks like blood or coffee grounds.  · Your stool is bloody or black.  These symptoms may represent a serious problem that is an emergency. Do not wait to see if the symptoms will go away. Get medical help right away. Call your local emergency services (911 in the [place] Do not drive yourself to the hospital.  Summary  · Heartburn is a type of pain or discomfort that can happen in the throat or chest. It is often described as a burning pain. It may also cause a bad, acid-like taste in the mouth.  · Avoid certain foods and drinks as told by your health care provider.  · Take over-the-counter and prescription  medicines only as told by your health care provider. Do not take aspirin, ibuprofen, or other NSAIDs unless your health care provider told you to do so.  · Contact a health care provider if your symptoms do not improve or they get worse.  This information is not intended to replace advice given to you by your health care provider. Make sure you discuss any questions you have with your health care provider.  Document Released: 05/06/2010 Document Revised: 05/20/2019 Document Reviewed: 05/20/2019  [employer] Interactive Patient Education © 2020 [employer]

## 2020-03-18 NOTE — PROGRESS NOTES
Chief Complaint   Patient presents with   • Heartburn       Subjective    Cy Blair is a 59 y.o. male. he is here today for follow-up.    59-year-old male presents for follow-up after upper GI series due to indigestion and bloating.  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  He does report he somewhat constipated but takes Senokot every 2 to 3 days.  His weight has remained stable.  He has been on omeprazole 20 for some time without relief in symptoms.  Upper GI was completed 3/10/2020 noted normal upper GI and follow-through no reflux was observed.  Reports symptoms typically only occur with eating and not with drinking.      Heartburn   He complains of abdominal pain and heartburn. He reports no belching, no chest pain, no choking, no coughing, no dysphagia, no early satiety, no globus sensation, no hoarse voice or no nausea. This is a chronic problem. The problem occurs occasionally. The problem has been waxing and waning. The heartburn changes with position. The symptoms are aggravated by certain foods, exertion and lying down. Pertinent negatives include no anemia, fatigue, melena, muscle weakness, orthopnea or weight loss. He has tried a PPI for the symptoms. The treatment provided mild relief.            The following portions of the patient's history were reviewed and updated as appropriate:   Past Medical History:   Diagnosis Date   • Candidiasis of skin     Candidiasis of skin - has flareups during the summer   • Essential (primary) hypertension      Past Surgical History:   Procedure Laterality Date   • KIDNEY STONE SURGERY     • NECK SURGERY      for herniated discs and spinal stenosis     Family History   Problem Relation Age of Onset   • Heart attack Mother 81        POSSIBLE MI   • Heart disease Father    • Sudden death  Other    • Hypertension Brother    • Dementia Maternal Grandmother    • No Known Problems Maternal Grandfather    • No Known Problems Paternal Grandmother    • Clotting disorder Paternal Grandfather        Prior to Admission medications    Medication Sig Start Date End Date Taking? Authorizing Provider   aspirin 81 MG chewable tablet Chew 81 mg Daily.   Yes Fernando Saldivar MD   lisinopril (PRINIVIL,ZESTRIL) 40 MG tablet TAKE 1 TABLET DAILY 9/24/19  Yes Sarah Gilbert APRN   losartan-hydrochlorothiazide (HYZAAR) 100-12.5 MG per tablet TAKE 1 TABLET DAILY 2/27/20  Yes Sarah Gilbert APRN   Omega-3 Fatty Acids (FISH OIL BURP-LESS) 1200 MG capsule Take  by mouth.   Yes ProviderFernando MD Prenat-FeCbn-FeBisg-FA-Omega (MULTIVITAMIN/MINERALS PO) Med Name: multivitamin & minerals #11-folic acid oral   Yes ProviderFernando MD   senna-docusate (SENOKOT S) 8.6-50 MG per tablet 1-2 tablets Daily.   Yes ProviderFernando MD   vitamin C (ASCORBIC ACID) 500 MG tablet Take 500 mg by mouth Daily.   Yes Emergency, Nurse Epic, RN   omeprazole (priLOSEC) 20 MG capsule Take 20 mg by mouth Daily.  3/18/20 Yes Fernando Saldivar MD   pantoprazole (PROTONIX) 40 MG EC tablet Take 1 tablet by mouth Daily. 3/18/20   Robyn Dennis APRN     No Known Allergies  Social History     Socioeconomic History   • Marital status:      Spouse name: Not on file   • Number of children: Not on file   • Years of education: Not on file   • Highest education level: Not on file   Tobacco Use   • Smoking status: Never Smoker   • Smokeless tobacco: Never Used   Substance and Sexual Activity   • Alcohol use: No   • Drug use: No   • Sexual activity: Defer       Review of Systems  Review of Systems   Constitutional: Negative for fatigue and weight loss.   HENT: Negative for hoarse voice.    Respiratory: Negative for cough and choking.    Cardiovascular: Negative for chest pain.   Gastrointestinal: Positive for abdominal pain and  "heartburn. Negative for dysphagia, melena and nausea.   Musculoskeletal: Negative for muscle weakness.        /88 (BP Location: Left arm)   Pulse 86   Ht 182.9 cm (72\")   Wt 127 kg (280 lb)   BMI 37.97 kg/m²     Objective    Physical Exam   Constitutional: He is oriented to person, place, and time. He appears well-developed and well-nourished. He is cooperative. No distress.   HENT:   Head: Normocephalic and atraumatic.   Neck: Normal range of motion. Neck supple. No thyromegaly present.   Cardiovascular: Normal rate, regular rhythm and normal heart sounds.   Pulmonary/Chest: Effort normal and breath sounds normal. He has no wheezes. He has no rhonchi. He has no rales.   Abdominal: Soft. Normal appearance and bowel sounds are normal. He exhibits no distension. There is no hepatosplenomegaly. There is tenderness in the epigastric area and left upper quadrant. There is no rigidity and no guarding. No hernia.   Lymphadenopathy:     He has no cervical adenopathy.   Neurological: He is alert and oriented to person, place, and time.   Skin: Skin is warm, dry and intact. No rash noted. No pallor.   Psychiatric: He has a normal mood and affect. His speech is normal.     Lab on 12/20/2019   Component Date Value Ref Range Status   • Color, UA 12/20/2019 Dark Yellow* Yellow, Straw Final   • Appearance, UA 12/20/2019 Slightly Cloudy* Clear Final   • pH, UA 12/20/2019 6.0  5.5 - 8.0 Final   • Specific Gravity, UA 12/20/2019 1.015  1.005 - 1.030 Final   • Glucose, UA 12/20/2019 Negative  Negative Final   • Ketones, UA 12/20/2019 Trace* Negative Final   • Bilirubin, UA 12/20/2019 Negative  Negative Final   • Blood, UA 12/20/2019 Negative  Negative Final   • Protein, UA 12/20/2019 Negative  Negative Final   • Leuk Esterase, UA 12/20/2019 Negative  Negative Final   • Nitrite, UA 12/20/2019 Negative  Negative Final   • Urobilinogen, UA 12/20/2019 1.0 E.U./dL  0.2 - 1.0 E.U./dL Final   • Glucose 12/20/2019 98  70 - 99 mg/dL " Final   • BUN 12/20/2019 15  7 - 23 mg/dL Final   • Creatinine 12/20/2019 1.10  0.70 - 1.30 mg/dL Final   • Sodium 12/20/2019 140  137 - 145 mmol/L Final   • Potassium 12/20/2019 3.8  3.4 - 5.0 mmol/L Final   • Chloride 12/20/2019 101  101 - 112 mmol/L Final   • CO2 12/20/2019 30.0  22.0 - 30.0 mmol/L Final   • Calcium 12/20/2019 10.2  8.4 - 10.2 mg/dL Final   • Total Protein 12/20/2019 8.3  6.3 - 8.6 g/dL Final   • Albumin 12/20/2019 4.90  3.50 - 5.00 g/dL Final   • ALT (SGPT) 12/20/2019 19  <=50 U/L Final   • AST (SGOT) 12/20/2019 23  17 - 59 U/L Final   • Alkaline Phosphatase 12/20/2019 79  38 - 126 U/L Final   • Total Bilirubin 12/20/2019 1.2  0.2 - 1.3 mg/dL Final   • eGFR Non  Amer 12/20/2019 68  49 - 113 mL/min/1.73 Final   • Globulin 12/20/2019 3.4  2.3 - 3.5 gm/dL Final   • A/G Ratio 12/20/2019 1.4  1.1 - 1.8 g/dL Final   • BUN/Creatinine Ratio 12/20/2019 13.6  7.0 - 25.0 Final   • Anion Gap 12/20/2019 9.0  5.0 - 15.0 mmol/L Final   • Hemoglobin A1C 12/20/2019 5.29  4.80 - 5.60 % Final   • Free T4 12/20/2019 1.52  0.93 - 1.70 ng/dL Final   • TSH 12/20/2019 1.760  0.270 - 4.200 uIU/mL Final   • Vitamin B-12 12/20/2019 952* 211 - 946 pg/mL Final   • 25 Hydroxy, Vitamin D 12/20/2019 38.1  30.0 - 100.0 ng/ml Final   • Total Cholesterol 12/20/2019 150  150 - 200 mg/dL Final   • Triglycerides 12/20/2019 97  <=150 mg/dL Final   • HDL Cholesterol 12/20/2019 42  40 - 59 mg/dL Final   • LDL Cholesterol  12/20/2019 89  <=100 mg/dL Final   • VLDL Cholesterol 12/20/2019 19.4  mg/dL Final   • LDL/HDL Ratio 12/20/2019 2.11  0.00 - 3.55 Final   • PSA 12/20/2019 0.180  0.000 - 4.000 ng/mL Final   • WBC 12/20/2019 7.16  3.40 - 10.80 10*3/mm3 Final   • RBC 12/20/2019 5.19  4.14 - 5.80 10*6/mm3 Final   • Hemoglobin 12/20/2019 16.0  13.0 - 17.7 g/dL Final   • Hematocrit 12/20/2019 44.2  37.5 - 51.0 % Final   • MCV 12/20/2019 85.2  79.0 - 97.0 fL Final   • MCH 12/20/2019 30.8  26.6 - 33.0 pg Final   • MCHC 12/20/2019  36.2* 31.5 - 35.7 g/dL Final   • RDW 12/20/2019 12.8  12.3 - 15.4 % Final   • RDW-SD 12/20/2019 39.8  37.0 - 54.0 fl Final   • MPV 12/20/2019 10.5  6.0 - 12.0 fL Final   • Platelets 12/20/2019 226  140 - 450 10*3/mm3 Final   • Neutrophil % 12/20/2019 64.3  42.7 - 76.0 % Final   • Lymphocyte % 12/20/2019 25.7  19.6 - 45.3 % Final   • Monocyte % 12/20/2019 8.5  5.0 - 12.0 % Final   • Eosinophil % 12/20/2019 1.1  0.3 - 6.2 % Final   • Basophil % 12/20/2019 0.4  0.0 - 1.5 % Final   • Neutrophils, Absolute 12/20/2019 4.60  1.70 - 7.00 10*3/mm3 Final   • Lymphocytes, Absolute 12/20/2019 1.84  0.70 - 3.10 10*3/mm3 Final   • Monocytes, Absolute 12/20/2019 0.61  0.10 - 0.90 10*3/mm3 Final   • Eosinophils, Absolute 12/20/2019 0.08  0.00 - 0.40 10*3/mm3 Final   • Basophils, Absolute 12/20/2019 0.03  0.00 - 0.20 10*3/mm3 Final     Assessment/Plan      1. Gastroesophageal reflux disease, esophagitis presence not specified    .   Will discontinue omeprazole start patient on Protonix 40 mg recommend he continue with avoidance of gastric irritants follow standard antireflux measures.  Also obtain lab work to rule out H. pylori and treat if indicated.  Still recommend EGD but unable to schedule at this time.  Follow-up in 1 month for recheck discussed with patient if he still has symptoms with Protonix we will add Carafate he will contact office if that is needed.    Orders placed during this encounter include:  Orders Placed This Encounter   Procedures   • Helicobacter Pylori, IgA IgG IgM       * Surgery not found *    Review and/or summary of lab tests, radiology, procedures, medications. Review and summary of old records and obtaining of history. The risks and benefits of my recommendations, as well as other treatment options were discussed with the patient today. Questions were answered.    New Medications Ordered This Visit   Medications   • pantoprazole (PROTONIX) 40 MG EC tablet     Sig: Take 1 tablet by mouth Daily.      Dispense:  30 tablet     Refill:  5       Follow-up: Return in about 4 weeks (around 4/15/2020) for Recheck.          This document has been electronically signed by MARTINA Montelongo on March 18, 2020 15:13             Results for orders placed or performed in visit on 12/20/19   PSA Screen   Result Value Ref Range    PSA 0.180 0.000 - 4.000 ng/mL   Urinalysis With Culture If Indicated - Urine, Clean Catch   Result Value Ref Range    Color, UA Dark Yellow (A) Yellow, Straw    Appearance, UA Slightly Cloudy (A) Clear    pH, UA 6.0 5.5 - 8.0    Specific Gravity, UA 1.015 1.005 - 1.030    Glucose, UA Negative Negative    Ketones, UA Trace (A) Negative    Bilirubin, UA Negative Negative    Blood, UA Negative Negative    Protein, UA Negative Negative    Leuk Esterase, UA Negative Negative    Nitrite, UA Negative Negative    Urobilinogen, UA 1.0 E.U./dL 0.2 - 1.0 E.U./dL   CBC Auto Differential   Result Value Ref Range    WBC 7.16 3.40 - 10.80 10*3/mm3    RBC 5.19 4.14 - 5.80 10*6/mm3    Hemoglobin 16.0 13.0 - 17.7 g/dL    Hematocrit 44.2 37.5 - 51.0 %    MCV 85.2 79.0 - 97.0 fL    MCH 30.8 26.6 - 33.0 pg    MCHC 36.2 (H) 31.5 - 35.7 g/dL    RDW 12.8 12.3 - 15.4 %    RDW-SD 39.8 37.0 - 54.0 fl    MPV 10.5 6.0 - 12.0 fL    Platelets 226 140 - 450 10*3/mm3    Neutrophil % 64.3 42.7 - 76.0 %    Lymphocyte % 25.7 19.6 - 45.3 %    Monocyte % 8.5 5.0 - 12.0 %    Eosinophil % 1.1 0.3 - 6.2 %    Basophil % 0.4 0.0 - 1.5 %    Neutrophils, Absolute 4.60 1.70 - 7.00 10*3/mm3    Lymphocytes, Absolute 1.84 0.70 - 3.10 10*3/mm3    Monocytes, Absolute 0.61 0.10 - 0.90 10*3/mm3    Eosinophils, Absolute 0.08 0.00 - 0.40 10*3/mm3    Basophils, Absolute 0.03 0.00 - 0.20 10*3/mm3   Vitamin D 25 Hydroxy   Result Value Ref Range    25 Hydroxy, Vitamin D 38.1 30.0 - 100.0 ng/ml   TSH   Result Value Ref Range    TSH 1.760 0.270 - 4.200 uIU/mL   T4, Free   Result Value Ref Range    Free T4 1.52 0.93 - 1.70 ng/dL   Hemoglobin A1c   Result Value  Ref Range    Hemoglobin A1C 5.29 4.80 - 5.60 %   Vitamin B12   Result Value Ref Range    Vitamin B-12 952 (H) 211 - 946 pg/mL   Lipid Panel   Result Value Ref Range    Total Cholesterol 150 150 - 200 mg/dL    Triglycerides 97 <=150 mg/dL    HDL Cholesterol 42 40 - 59 mg/dL    LDL Cholesterol  89 <=100 mg/dL    VLDL Cholesterol 19.4 mg/dL    LDL/HDL Ratio 2.11 0.00 - 3.55   Comprehensive Metabolic Panel   Result Value Ref Range    Glucose 98 70 - 99 mg/dL    BUN 15 7 - 23 mg/dL    Creatinine 1.10 0.70 - 1.30 mg/dL    Sodium 140 137 - 145 mmol/L    Potassium 3.8 3.4 - 5.0 mmol/L    Chloride 101 101 - 112 mmol/L    CO2 30.0 22.0 - 30.0 mmol/L    Calcium 10.2 8.4 - 10.2 mg/dL    Total Protein 8.3 6.3 - 8.6 g/dL    Albumin 4.90 3.50 - 5.00 g/dL    ALT (SGPT) 19 <=50 U/L    AST (SGOT) 23 17 - 59 U/L    Alkaline Phosphatase 79 38 - 126 U/L    Total Bilirubin 1.2 0.2 - 1.3 mg/dL    eGFR Non  Amer 68 49 - 113 mL/min/1.73    Globulin 3.4 2.3 - 3.5 gm/dL    A/G Ratio 1.4 1.1 - 1.8 g/dL    BUN/Creatinine Ratio 13.6 7.0 - 25.0    Anion Gap 9.0 5.0 - 15.0 mmol/L   Results for orders placed or performed in visit on 06/28/18   Hemoglobin A1c   Result Value Ref Range    Hemoglobin A1C 5.2 4 - 5.6 %   Lipid Panel   Result Value Ref Range    Total Cholesterol 138 (L) 150 - 200 mg/dL    Triglycerides 110 <=150 mg/dL    HDL Cholesterol 39 (L) 40 - 59 mg/dL    LDL Cholesterol  77 <=100 mg/dL    VLDL Cholesterol 22 mg/dL    LDL/HDL Ratio 1.97 0.00 - 3.55   Results for orders placed or performed in visit on 04/25/18   Basic Metabolic Panel   Result Value Ref Range    Glucose 102 (H) 74 - 99 mg/dL    BUN 17 9 - 20 mg/dL    Creatinine 0.99 0.66 - 1.25 mg/dL    Sodium 141 137 - 145 mmol/L    Potassium 4.4 3.4 - 5.0 mmol/L    Chloride 103 98 - 107 mmol/L    CO2 27.0 22.0 - 30.0 mmol/L    Calcium 9.7 8.4 - 10.2 mg/dL    eGFR Non  Amer 77 49 - 113 mL/min/1.73    BUN/Creatinine Ratio 17.2 7.0 - 25.0    Anion Gap 11.0 5.0 - 15.0  mmol/L   Results for orders placed or performed during the hospital encounter of 02/19/18   POCT Influenza A/B   Result Value Ref Range    Rapid Influenza A Ag neg     Rapid Influenza B Ag pos     Internal Control Passed Passed    Lot Number [id number]     Expiration Date 3/20    Results for orders placed or performed in visit on 09/13/17   CBC Auto Differential   Result Value Ref Range    WBC 4.91 3.20 - 9.80 10*3/mm3    RBC 4.97 4.37 - 5.74 10*6/mm3    Hemoglobin 15.5 13.7 - 17.3 g/dL    Hematocrit 43.6 39.0 - 49.0 %    MCV 87.7 80.0 - 98.0 fL    MCH 31.2 26.5 - 34.0 pg    MCHC 35.6 31.5 - 36.3 g/dL    RDW 13.0 11.5 - 14.5 %    RDW-SD 40.4 35.1 - 43.9 fl    MPV 10.9 8.0 - 12.0 fL    Platelets 216 150 - 450 10*3/mm3    Neutrophil % 58.4 37.0 - 80.0 %    Lymphocyte % 28.7 10.0 - 50.0 %    Monocyte % 9.4 0.0 - 12.0 %    Eosinophil % 2.9 0.0 - 7.0 %    Basophil % 0.6 0.0 - 2.0 %    Neutrophils, Absolute 2.87 2.00 - 8.60 10*3/mm3    Lymphocytes, Absolute 1.41 0.60 - 4.20 10*3/mm3    Monocytes, Absolute 0.46 0.00 - 0.90 10*3/mm3    Eosinophils, Absolute 0.14 0.00 - 0.70 10*3/mm3    Basophils, Absolute 0.03 0.00 - 0.20 10*3/mm3     *Note: Due to a large number of results and/or encounters for the requested time period, some results have not been displayed. A complete set of results can be found in Results Review.

## 2020-03-20 LAB
H PYLORI IGA SER IA-ACNC: <9 UNITS (ref 0–8.9)
H PYLORI IGG SER IA-ACNC: 0.2 INDEX VALUE (ref 0–0.79)
H PYLORI IGM SER-ACNC: <9 UNITS (ref 0–8.9)

## 2020-03-23 DIAGNOSIS — I10 ESSENTIAL HYPERTENSION: ICD-10-CM

## 2020-03-23 RX ORDER — LISINOPRIL 40 MG/1
TABLET ORAL
Qty: 90 TABLET | Refills: 1 | Status: SHIPPED | OUTPATIENT
Start: 2020-03-23 | End: 2020-09-30

## 2020-04-10 RX ORDER — METOPROLOL SUCCINATE 25 MG/1
TABLET, EXTENDED RELEASE ORAL
Qty: 30 TABLET | Refills: 3 | Status: SHIPPED | OUTPATIENT
Start: 2020-04-10 | End: 2020-11-03 | Stop reason: SDUPTHER

## 2020-04-15 ENCOUNTER — OFFICE VISIT (OUTPATIENT)
Dept: GASTROENTEROLOGY | Facility: CLINIC | Age: 60
End: 2020-04-15

## 2020-04-15 VITALS
WEIGHT: 281.6 LBS | HEIGHT: 71 IN | BODY MASS INDEX: 39.42 KG/M2 | HEART RATE: 74 BPM | SYSTOLIC BLOOD PRESSURE: 141 MMHG | DIASTOLIC BLOOD PRESSURE: 86 MMHG

## 2020-04-15 DIAGNOSIS — K21.00 GASTROESOPHAGEAL REFLUX DISEASE WITH ESOPHAGITIS: Primary | ICD-10-CM

## 2020-04-15 DIAGNOSIS — R14.0 FLATULENCE/GAS PAIN/BELCHING: ICD-10-CM

## 2020-04-15 PROCEDURE — 99213 OFFICE O/P EST LOW 20 MIN: CPT | Performed by: NURSE PRACTITIONER

## 2020-04-15 RX ORDER — SUCRALFATE 1 G/1
1 TABLET ORAL 4 TIMES DAILY
Qty: 120 TABLET | Refills: 2 | Status: SHIPPED | OUTPATIENT
Start: 2020-04-15 | End: 2021-02-08

## 2020-04-15 RX ORDER — DEXLANSOPRAZOLE 60 MG/1
60 CAPSULE, DELAYED RELEASE ORAL DAILY
Qty: 30 CAPSULE | Refills: 5 | Status: SHIPPED | OUTPATIENT
Start: 2020-04-15 | End: 2020-04-16

## 2020-04-15 NOTE — PATIENT INSTRUCTIONS

## 2020-04-15 NOTE — PROGRESS NOTES
Chief Complaint   Patient presents with   • Heartburn   • Bloated   • Nausea       Subjective    Cy Blair is a 59 y.o. male. he is here today for follow-up.    59-year-old male presents for one-month recheck regarding heartburn, bloating and nausea.  States Protonix did not help and seem to cause more symptoms than Prilosec previously did.  States he has been cleared by Dr. Santiago and is able to go through EGD though we cannot schedule nail due to current pandemic.    Heartburn   He complains of abdominal pain (pressure ), belching (spitting up chunks ), globus sensation and heartburn. He reports no chest pain, no choking, no coughing, no dysphagia, no early satiety, no hoarse voice, no nausea, no sore throat, no stridor, no tooth decay, no water brash or no wheezing. This is a chronic problem. The current episode started more than 1 year ago. Pertinent negatives include no fatigue.     Upper GI with small bowel follow-through noted no abnormalities, H. pylori antibodies were negative.       The following portions of the patient's history were reviewed and updated as appropriate:   Past Medical History:   Diagnosis Date   • Candidiasis of skin     Candidiasis of skin - has flareups during the summer   • Essential (primary) hypertension      Past Surgical History:   Procedure Laterality Date   • KIDNEY STONE SURGERY     • NECK SURGERY      for herniated discs and spinal stenosis     Family History   Problem Relation Age of Onset   • Heart attack Mother 81        POSSIBLE MI   • Heart disease Father    • Sudden death Other    • Hypertension Brother    • Dementia Maternal Grandmother    • No Known Problems Maternal Grandfather    • No Known Problems Paternal Grandmother    • Clotting disorder Paternal Grandfather        Prior to Admission medications    Medication Sig Start Date End Date Taking? Authorizing Provider   aspirin 81 MG chewable tablet Chew 81 mg Daily.   Yes Provider, MD Fernando   lisinopril  (PRINIVIL,ZESTRIL) 40 MG tablet TAKE 1 TABLET DAILY 3/23/20  Yes Sarah Gilbert APRN   losartan-hydrochlorothiazide (HYZAAR) 100-12.5 MG per tablet TAKE 1 TABLET DAILY 2/27/20  Yes Sarah Gilbert APRN   metoprolol succinate XL (Toprol XL) 25 MG 24 hr tablet Take a 0.5 tablet once daily. 4/10/20  Yes Kate Santiago MD   Omega-3 Fatty Acids (FISH OIL BURP-LESS) 1200 MG capsule Take  by mouth.   Yes ProviderFernando MD   pantoprazole (PROTONIX) 40 MG EC tablet Take 1 tablet by mouth Daily. 3/18/20  Yes Robyn Dennis APRN   Rfioer-GmAmn-UmRwsm-FA-Omega (MULTIVITAMIN/MINERALS PO) Med Name: multivitamin & minerals #11-folic acid oral   Yes ProviderFernando MD   senna-docusate (SENOKOT S) 8.6-50 MG per tablet 1-2 tablets Daily.   Yes ProviderFernando MD   vitamin C (ASCORBIC ACID) 500 MG tablet Take 500 mg by mouth Daily.   Yes Emergency, Nurse Epic, RN     No Known Allergies  Social History     Socioeconomic History   • Marital status:      Spouse name: Not on file   • Number of children: Not on file   • Years of education: Not on file   • Highest education level: Not on file   Tobacco Use   • Smoking status: Never Smoker   • Smokeless tobacco: Never Used   Substance and Sexual Activity   • Alcohol use: No   • Drug use: No   • Sexual activity: Defer       Review of Systems  Review of Systems   Constitutional: Negative for activity change, appetite change, chills, diaphoresis, fatigue, fever and unexpected weight change.   HENT: Negative for hoarse voice, sore throat and trouble swallowing.    Respiratory: Negative for cough, choking, shortness of breath and wheezing.    Cardiovascular: Negative for chest pain.   Gastrointestinal: Positive for abdominal pain (pressure ) and heartburn. Negative for abdominal distention, anal bleeding, blood in stool, constipation, diarrhea, dysphagia, nausea, rectal pain and vomiting.   Musculoskeletal: Negative for arthralgias.   Skin: Negative for pallor.  "  Neurological: Negative for light-headedness.        /86 (BP Location: Left arm, Patient Position: Sitting)   Pulse 74   Ht 180.3 cm (71\")   Wt 128 kg (281 lb 9.6 oz)   BMI 39.28 kg/m²     Objective    Physical Exam   Constitutional: He is oriented to person, place, and time. He appears well-developed and well-nourished. He is cooperative. No distress.   HENT:   Head: Normocephalic and atraumatic.   Neck: Normal range of motion. Neck supple. No thyromegaly present.   Cardiovascular: Normal rate, regular rhythm and normal heart sounds.   Pulmonary/Chest: Effort normal and breath sounds normal. He has no wheezes. He has no rhonchi. He has no rales.   Abdominal: Soft. Normal appearance and bowel sounds are normal. He exhibits no distension. There is no hepatosplenomegaly. There is tenderness in the epigastric area. There is no rigidity and no guarding. No hernia.   Lymphadenopathy:     He has no cervical adenopathy.   Neurological: He is alert and oriented to person, place, and time.   Skin: Skin is warm, dry and intact. No rash noted. No pallor.   Psychiatric: He has a normal mood and affect. His speech is normal.     Office Visit on 03/18/2020   Component Date Value Ref Range Status   • H. pylori IgG 03/18/2020 0.20  0.00 - 0.79 Index Value Final                                 Negative           <0.80                               Equivocal    0.80 - 0.89                               Positive           >0.89   • H. pylori, IgA ABS 03/18/2020 <9.0  0.0 - 8.9 units Final                                    Negative          <9.0                                  Equivocal   9.0 - 11.0                                  Positive         >11.0   • H. Pylori, IgM 03/18/2020 <9.0  0.0 - 8.9 units Final                                    Negative          <9.0                                  Equivocal   9.0 - 11.0                                  Positive         >11.0  This test was developed and its performance " characteristics  determined by [facility]. It has not been cleared or approved  by the Food and Drug Administration.     Assessment/Plan      1. Gastroesophageal reflux disease with esophagitis    2. Flatulence/gas pain/belching    .   Discontinue Protonix or patient on Dexilant and add Carafate.  Recommend trial of probiotic he will obtain over-the-counter.  Follow-up in 1 month for recheck if no improvement will go ahead and plan EGD at that time if possible.    Orders placed during this encounter include:  No orders of the defined types were placed in this encounter.      * Surgery not found *    Review and/or summary of lab tests, radiology, procedures, medications. Review and summary of old records and obtaining of history. The risks and benefits of my recommendations, as well as other treatment options were discussed with the patient today. Questions were answered.    New Medications Ordered This Visit   Medications   • dexlansoprazole (DEXILANT) 60 MG capsule     Sig: Take 1 capsule by mouth Daily for 180 days.     Dispense:  30 capsule     Refill:  5   • sucralfate (Carafate) 1 g tablet     Sig: Take 1 tablet by mouth 4 (Four) Times a Day.     Dispense:  120 tablet     Refill:  2       Follow-up: Return in about 4 weeks (around 5/13/2020).          This document has been electronically signed by MARTINA Montelongo on April 15, 2020 14:12             Results for orders placed or performed in visit on 03/18/20   Helicobacter Pylori, IgA IgG IgM   Result Value Ref Range    H. pylori IgG 0.20 0.00 - 0.79 Index Value    H. pylori, IgA ABS <9.0 0.0 - 8.9 units    H. Pylori, IgM <9.0 0.0 - 8.9 units   Results for orders placed or performed in visit on 12/20/19   PSA Screen   Result Value Ref Range    PSA 0.180 0.000 - 4.000 ng/mL   Urinalysis With Culture If Indicated - Urine, Clean Catch   Result Value Ref Range    Color, UA Dark Yellow (A) Yellow, Straw    Appearance, UA Slightly Cloudy (A) Clear    pH, UA 6.0 5.5 -  8.0    Specific Gravity, UA 1.015 1.005 - 1.030    Glucose, UA Negative Negative    Ketones, UA Trace (A) Negative    Bilirubin, UA Negative Negative    Blood, UA Negative Negative    Protein, UA Negative Negative    Leuk Esterase, UA Negative Negative    Nitrite, UA Negative Negative    Urobilinogen, UA 1.0 E.U./dL 0.2 - 1.0 E.U./dL   CBC Auto Differential   Result Value Ref Range    WBC 7.16 3.40 - 10.80 10*3/mm3    RBC 5.19 4.14 - 5.80 10*6/mm3    Hemoglobin 16.0 13.0 - 17.7 g/dL    Hematocrit 44.2 37.5 - 51.0 %    MCV 85.2 79.0 - 97.0 fL    MCH 30.8 26.6 - 33.0 pg    MCHC 36.2 (H) 31.5 - 35.7 g/dL    RDW 12.8 12.3 - 15.4 %    RDW-SD 39.8 37.0 - 54.0 fl    MPV 10.5 6.0 - 12.0 fL    Platelets 226 140 - 450 10*3/mm3    Neutrophil % 64.3 42.7 - 76.0 %    Lymphocyte % 25.7 19.6 - 45.3 %    Monocyte % 8.5 5.0 - 12.0 %    Eosinophil % 1.1 0.3 - 6.2 %    Basophil % 0.4 0.0 - 1.5 %    Neutrophils, Absolute 4.60 1.70 - 7.00 10*3/mm3    Lymphocytes, Absolute 1.84 0.70 - 3.10 10*3/mm3    Monocytes, Absolute 0.61 0.10 - 0.90 10*3/mm3    Eosinophils, Absolute 0.08 0.00 - 0.40 10*3/mm3    Basophils, Absolute 0.03 0.00 - 0.20 10*3/mm3   Vitamin D 25 Hydroxy   Result Value Ref Range    25 Hydroxy, Vitamin D 38.1 30.0 - 100.0 ng/ml   TSH   Result Value Ref Range    TSH 1.760 0.270 - 4.200 uIU/mL   T4, Free   Result Value Ref Range    Free T4 1.52 0.93 - 1.70 ng/dL   Hemoglobin A1c   Result Value Ref Range    Hemoglobin A1C 5.29 4.80 - 5.60 %   Vitamin B12   Result Value Ref Range    Vitamin B-12 952 (H) 211 - 946 pg/mL   Lipid Panel   Result Value Ref Range    Total Cholesterol 150 150 - 200 mg/dL    Triglycerides 97 <=150 mg/dL    HDL Cholesterol 42 40 - 59 mg/dL    LDL Cholesterol  89 <=100 mg/dL    VLDL Cholesterol 19.4 mg/dL    LDL/HDL Ratio 2.11 0.00 - 3.55   Comprehensive Metabolic Panel   Result Value Ref Range    Glucose 98 70 - 99 mg/dL    BUN 15 7 - 23 mg/dL    Creatinine 1.10 0.70 - 1.30 mg/dL    Sodium 140 137 - 145  mmol/L    Potassium 3.8 3.4 - 5.0 mmol/L    Chloride 101 101 - 112 mmol/L    CO2 30.0 22.0 - 30.0 mmol/L    Calcium 10.2 8.4 - 10.2 mg/dL    Total Protein 8.3 6.3 - 8.6 g/dL    Albumin 4.90 3.50 - 5.00 g/dL    ALT (SGPT) 19 <=50 U/L    AST (SGOT) 23 17 - 59 U/L    Alkaline Phosphatase 79 38 - 126 U/L    Total Bilirubin 1.2 0.2 - 1.3 mg/dL    eGFR Non  Amer 68 49 - 113 mL/min/1.73    Globulin 3.4 2.3 - 3.5 gm/dL    A/G Ratio 1.4 1.1 - 1.8 g/dL    BUN/Creatinine Ratio 13.6 7.0 - 25.0    Anion Gap 9.0 5.0 - 15.0 mmol/L   Results for orders placed or performed in visit on 06/28/18   Hemoglobin A1c   Result Value Ref Range    Hemoglobin A1C 5.2 4 - 5.6 %   Lipid Panel   Result Value Ref Range    Total Cholesterol 138 (L) 150 - 200 mg/dL    Triglycerides 110 <=150 mg/dL    HDL Cholesterol 39 (L) 40 - 59 mg/dL    LDL Cholesterol  77 <=100 mg/dL    VLDL Cholesterol 22 mg/dL    LDL/HDL Ratio 1.97 0.00 - 3.55   Results for orders placed or performed in visit on 04/25/18   Basic Metabolic Panel   Result Value Ref Range    Glucose 102 (H) 74 - 99 mg/dL    BUN 17 9 - 20 mg/dL    Creatinine 0.99 0.66 - 1.25 mg/dL    Sodium 141 137 - 145 mmol/L    Potassium 4.4 3.4 - 5.0 mmol/L    Chloride 103 98 - 107 mmol/L    CO2 27.0 22.0 - 30.0 mmol/L    Calcium 9.7 8.4 - 10.2 mg/dL    eGFR Non  Amer 77 49 - 113 mL/min/1.73    BUN/Creatinine Ratio 17.2 7.0 - 25.0    Anion Gap 11.0 5.0 - 15.0 mmol/L   Results for orders placed or performed during the hospital encounter of 02/19/18   POCT Influenza A/B   Result Value Ref Range    Rapid Influenza A Ag neg     Rapid Influenza B Ag pos     Internal Control Passed Passed    Lot Number [id number]     Expiration Date 3/20    Results for orders placed or performed in visit on 09/13/17   CBC Auto Differential   Result Value Ref Range    WBC 4.91 3.20 - 9.80 10*3/mm3    RBC 4.97 4.37 - 5.74 10*6/mm3    Hemoglobin 15.5 13.7 - 17.3 g/dL    Hematocrit 43.6 39.0 - 49.0 %    MCV 87.7 80.0 - 98.0  fL    MCH 31.2 26.5 - 34.0 pg    MCHC 35.6 31.5 - 36.3 g/dL    RDW 13.0 11.5 - 14.5 %    RDW-SD 40.4 35.1 - 43.9 fl    MPV 10.9 8.0 - 12.0 fL    Platelets 216 150 - 450 10*3/mm3    Neutrophil % 58.4 37.0 - 80.0 %    Lymphocyte % 28.7 10.0 - 50.0 %    Monocyte % 9.4 0.0 - 12.0 %    Eosinophil % 2.9 0.0 - 7.0 %    Basophil % 0.6 0.0 - 2.0 %    Neutrophils, Absolute 2.87 2.00 - 8.60 10*3/mm3    Lymphocytes, Absolute 1.41 0.60 - 4.20 10*3/mm3    Monocytes, Absolute 0.46 0.00 - 0.90 10*3/mm3    Eosinophils, Absolute 0.14 0.00 - 0.70 10*3/mm3    Basophils, Absolute 0.03 0.00 - 0.20 10*3/mm3     *Note: Due to a large number of results and/or encounters for the requested time period, some results have not been displayed. A complete set of results can be found in Results Review.

## 2020-04-16 ENCOUNTER — OFFICE VISIT (OUTPATIENT)
Dept: GASTROENTEROLOGY | Facility: CLINIC | Age: 60
End: 2020-04-16

## 2020-04-16 DIAGNOSIS — K21.9 GASTROESOPHAGEAL REFLUX DISEASE, ESOPHAGITIS PRESENCE NOT SPECIFIED: Primary | ICD-10-CM

## 2020-04-16 PROCEDURE — 99441 PR PHYS/QHP TELEPHONE EVALUATION 5-10 MIN: CPT | Performed by: NURSE PRACTITIONER

## 2020-04-16 RX ORDER — OMEPRAZOLE 40 MG/1
40 CAPSULE, DELAYED RELEASE ORAL DAILY
Qty: 30 CAPSULE | Refills: 11 | Status: SHIPPED | OUTPATIENT
Start: 2020-04-16 | End: 2021-04-13

## 2020-04-16 NOTE — PROGRESS NOTES
No chief complaint on file.      Subjective    Cy Blair is a 59 y.o. male. he is here today for follow-up.    You have chosen to receive care through a telephone visit. Do you consent to use a telephone visit for your medical care today? Yes    Heartburn   He complains of abdominal pain (pressure in upper stomach after eating ) and heartburn. He reports no early satiety, no globus sensation, no nausea or no sore throat. Associated symptoms include fatigue.     Patient presents via telephone encounter to discuss medications that were prescribed yesterday.  States Dexilant cost $300 with insurance coverage and he is concerned about interaction of Carafate due to other medications.  We discussed how to take medications to best add Carafate discontinue Dexilant and restarted patient on Prilosec which worked better for him than Protonix in the past.       The following portions of the patient's history were reviewed and updated as appropriate:   Past Medical History:   Diagnosis Date   • Candidiasis of skin     Candidiasis of skin - has flareups during the summer   • Essential (primary) hypertension      Past Surgical History:   Procedure Laterality Date   • KIDNEY STONE SURGERY     • NECK SURGERY      for herniated discs and spinal stenosis     Family History   Problem Relation Age of Onset   • Heart attack Mother 81        POSSIBLE MI   • Heart disease Father    • Sudden death Other    • Hypertension Brother    • Dementia Maternal Grandmother    • No Known Problems Maternal Grandfather    • No Known Problems Paternal Grandmother    • Clotting disorder Paternal Grandfather        Prior to Admission medications    Medication Sig Start Date End Date Taking? Authorizing Provider   aspirin 81 MG chewable tablet Chew 81 mg Daily.    Provider, MD Fernando   dexlansoprazole (DEXILANT) 60 MG capsule Take 1 capsule by mouth Daily for 180 days. 4/15/20 10/12/20  Robyn Dennis APRN   lisinopril (PRINIVIL,ZESTRIL) 40 MG  tablet TAKE 1 TABLET DAILY 3/23/20   Sarah Gilbert APRN   losartan-hydrochlorothiazide (HYZAAR) 100-12.5 MG per tablet TAKE 1 TABLET DAILY 2/27/20   Sarah Gilbert APRN   metoprolol succinate XL (Toprol XL) 25 MG 24 hr tablet Take a 0.5 tablet once daily. 4/10/20   Kate Santiago MD   Omega-3 Fatty Acids (FISH OIL BURP-LESS) 1200 MG capsule Take  by mouth.    ProviderFernando MD Prenat-FeCbn-FeBisg-FA-Omega (MULTIVITAMIN/MINERALS PO) Med Name: multivitamin & minerals #11-folic acid oral    Provider, MD Fernando   senna-docusate (SENOKOT S) 8.6-50 MG per tablet 1-2 tablets Daily.    Provider, MD Fernando   sucralfate (Carafate) 1 g tablet Take 1 tablet by mouth 4 (Four) Times a Day. 4/15/20   Robyn Dennis APRN   vitamin C (ASCORBIC ACID) 500 MG tablet Take 500 mg by mouth Daily.    Emergency, Nurse Epic, RN     No Known Allergies  Social History     Socioeconomic History   • Marital status:      Spouse name: Not on file   • Number of children: Not on file   • Years of education: Not on file   • Highest education level: Not on file   Tobacco Use   • Smoking status: Never Smoker   • Smokeless tobacco: Never Used   Substance and Sexual Activity   • Alcohol use: No   • Drug use: No   • Sexual activity: Defer       Review of Systems  Review of Systems   Constitutional: Positive for fatigue. Negative for activity change, appetite change, chills, diaphoresis, fever and unexpected weight change.   HENT: Negative for sore throat and trouble swallowing.    Respiratory: Negative for shortness of breath.    Gastrointestinal: Positive for abdominal pain (pressure in upper stomach after eating ) and heartburn. Negative for abdominal distention, anal bleeding, blood in stool, constipation, diarrhea, nausea, rectal pain and vomiting.   Musculoskeletal: Negative for arthralgias.   Skin: Negative for pallor.   Neurological: Negative for light-headedness.        There were no vitals taken for this  visit.    Objective    Physical Exam   Psychiatric: He has a normal mood and affect. His speech is normal.     Office Visit on 03/18/2020   Component Date Value Ref Range Status   • H. pylori IgG 03/18/2020 0.20  0.00 - 0.79 Index Value Final                                 Negative           <0.80                               Equivocal    0.80 - 0.89                               Positive           >0.89   • H. pylori, IgA ABS 03/18/2020 <9.0  0.0 - 8.9 units Final                                    Negative          <9.0                                  Equivocal   9.0 - 11.0                                  Positive         >11.0   • H. Pylori, IgM 03/18/2020 <9.0  0.0 - 8.9 units Final                                    Negative          <9.0                                  Equivocal   9.0 - 11.0                                  Positive         >11.0  This test was developed and its performance characteristics  determined by [facility]. It has not been cleared or approved  by the Food and Drug Administration.     Assessment/Plan      1. Gastroesophageal reflux disease, esophagitis presence not specified    .   Discontinue Dexilant start patient back on Prilosec.  Continue with Carafate.  Contact office if he cannot tolerate discontinue medication contact office again if symptoms worsen or fail to improve otherwise follow-up as planned in 1 month for recheck.    Orders placed during this encounter include:  No orders of the defined types were placed in this encounter.  This visit has been rescheduled as a phone visit to comply with patient safety concerns in accordance with CDC recommendations. Total time of discussion was 8 minutes.        * Surgery not found *    Review and/or summary of lab tests, radiology, procedures, medications. Review and summary of old records and obtaining of history. The risks and benefits of my recommendations, as well as other treatment options were discussed with the patient today.  Questions were answered.    New Medications Ordered This Visit   Medications   • omeprazole (priLOSEC) 40 MG capsule     Sig: Take 1 capsule by mouth Daily.     Dispense:  30 capsule     Refill:  11       Follow-up: Return in about 4 weeks (around 5/14/2020) for Recheck, Next scheduled follow up.          This document has been electronically signed by MARTINA Montelongo on April 16, 2020 11:02             Results for orders placed or performed in visit on 03/18/20   Helicobacter Pylori, IgA IgG IgM   Result Value Ref Range    H. pylori IgG 0.20 0.00 - 0.79 Index Value    H. pylori, IgA ABS <9.0 0.0 - 8.9 units    H. Pylori, IgM <9.0 0.0 - 8.9 units   Results for orders placed or performed in visit on 12/20/19   PSA Screen   Result Value Ref Range    PSA 0.180 0.000 - 4.000 ng/mL   Urinalysis With Culture If Indicated - Urine, Clean Catch   Result Value Ref Range    Color, UA Dark Yellow (A) Yellow, Straw    Appearance, UA Slightly Cloudy (A) Clear    pH, UA 6.0 5.5 - 8.0    Specific Gravity, UA 1.015 1.005 - 1.030    Glucose, UA Negative Negative    Ketones, UA Trace (A) Negative    Bilirubin, UA Negative Negative    Blood, UA Negative Negative    Protein, UA Negative Negative    Leuk Esterase, UA Negative Negative    Nitrite, UA Negative Negative    Urobilinogen, UA 1.0 E.U./dL 0.2 - 1.0 E.U./dL   CBC Auto Differential   Result Value Ref Range    WBC 7.16 3.40 - 10.80 10*3/mm3    RBC 5.19 4.14 - 5.80 10*6/mm3    Hemoglobin 16.0 13.0 - 17.7 g/dL    Hematocrit 44.2 37.5 - 51.0 %    MCV 85.2 79.0 - 97.0 fL    MCH 30.8 26.6 - 33.0 pg    MCHC 36.2 (H) 31.5 - 35.7 g/dL    RDW 12.8 12.3 - 15.4 %    RDW-SD 39.8 37.0 - 54.0 fl    MPV 10.5 6.0 - 12.0 fL    Platelets 226 140 - 450 10*3/mm3    Neutrophil % 64.3 42.7 - 76.0 %    Lymphocyte % 25.7 19.6 - 45.3 %    Monocyte % 8.5 5.0 - 12.0 %    Eosinophil % 1.1 0.3 - 6.2 %    Basophil % 0.4 0.0 - 1.5 %    Neutrophils, Absolute 4.60 1.70 - 7.00 10*3/mm3    Lymphocytes, Absolute  1.84 0.70 - 3.10 10*3/mm3    Monocytes, Absolute 0.61 0.10 - 0.90 10*3/mm3    Eosinophils, Absolute 0.08 0.00 - 0.40 10*3/mm3    Basophils, Absolute 0.03 0.00 - 0.20 10*3/mm3   Vitamin D 25 Hydroxy   Result Value Ref Range    25 Hydroxy, Vitamin D 38.1 30.0 - 100.0 ng/ml   TSH   Result Value Ref Range    TSH 1.760 0.270 - 4.200 uIU/mL   T4, Free   Result Value Ref Range    Free T4 1.52 0.93 - 1.70 ng/dL   Hemoglobin A1c   Result Value Ref Range    Hemoglobin A1C 5.29 4.80 - 5.60 %   Vitamin B12   Result Value Ref Range    Vitamin B-12 952 (H) 211 - 946 pg/mL   Lipid Panel   Result Value Ref Range    Total Cholesterol 150 150 - 200 mg/dL    Triglycerides 97 <=150 mg/dL    HDL Cholesterol 42 40 - 59 mg/dL    LDL Cholesterol  89 <=100 mg/dL    VLDL Cholesterol 19.4 mg/dL    LDL/HDL Ratio 2.11 0.00 - 3.55   Comprehensive Metabolic Panel   Result Value Ref Range    Glucose 98 70 - 99 mg/dL    BUN 15 7 - 23 mg/dL    Creatinine 1.10 0.70 - 1.30 mg/dL    Sodium 140 137 - 145 mmol/L    Potassium 3.8 3.4 - 5.0 mmol/L    Chloride 101 101 - 112 mmol/L    CO2 30.0 22.0 - 30.0 mmol/L    Calcium 10.2 8.4 - 10.2 mg/dL    Total Protein 8.3 6.3 - 8.6 g/dL    Albumin 4.90 3.50 - 5.00 g/dL    ALT (SGPT) 19 <=50 U/L    AST (SGOT) 23 17 - 59 U/L    Alkaline Phosphatase 79 38 - 126 U/L    Total Bilirubin 1.2 0.2 - 1.3 mg/dL    eGFR Non  Amer 68 49 - 113 mL/min/1.73    Globulin 3.4 2.3 - 3.5 gm/dL    A/G Ratio 1.4 1.1 - 1.8 g/dL    BUN/Creatinine Ratio 13.6 7.0 - 25.0    Anion Gap 9.0 5.0 - 15.0 mmol/L   Results for orders placed or performed in visit on 06/28/18   Hemoglobin A1c   Result Value Ref Range    Hemoglobin A1C 5.2 4 - 5.6 %   Lipid Panel   Result Value Ref Range    Total Cholesterol 138 (L) 150 - 200 mg/dL    Triglycerides 110 <=150 mg/dL    HDL Cholesterol 39 (L) 40 - 59 mg/dL    LDL Cholesterol  77 <=100 mg/dL    VLDL Cholesterol 22 mg/dL    LDL/HDL Ratio 1.97 0.00 - 3.55   Results for orders placed or performed in  visit on 04/25/18   Basic Metabolic Panel   Result Value Ref Range    Glucose 102 (H) 74 - 99 mg/dL    BUN 17 9 - 20 mg/dL    Creatinine 0.99 0.66 - 1.25 mg/dL    Sodium 141 137 - 145 mmol/L    Potassium 4.4 3.4 - 5.0 mmol/L    Chloride 103 98 - 107 mmol/L    CO2 27.0 22.0 - 30.0 mmol/L    Calcium 9.7 8.4 - 10.2 mg/dL    eGFR Non  Amer 77 49 - 113 mL/min/1.73    BUN/Creatinine Ratio 17.2 7.0 - 25.0    Anion Gap 11.0 5.0 - 15.0 mmol/L   Results for orders placed or performed during the hospital encounter of 02/19/18   POCT Influenza A/B   Result Value Ref Range    Rapid Influenza A Ag neg     Rapid Influenza B Ag pos     Internal Control Passed Passed    Lot Number [id number]     Expiration Date 3/20    Results for orders placed or performed in visit on 09/13/17   CBC Auto Differential   Result Value Ref Range    WBC 4.91 3.20 - 9.80 10*3/mm3    RBC 4.97 4.37 - 5.74 10*6/mm3    Hemoglobin 15.5 13.7 - 17.3 g/dL    Hematocrit 43.6 39.0 - 49.0 %    MCV 87.7 80.0 - 98.0 fL    MCH 31.2 26.5 - 34.0 pg    MCHC 35.6 31.5 - 36.3 g/dL    RDW 13.0 11.5 - 14.5 %    RDW-SD 40.4 35.1 - 43.9 fl    MPV 10.9 8.0 - 12.0 fL    Platelets 216 150 - 450 10*3/mm3    Neutrophil % 58.4 37.0 - 80.0 %    Lymphocyte % 28.7 10.0 - 50.0 %    Monocyte % 9.4 0.0 - 12.0 %    Eosinophil % 2.9 0.0 - 7.0 %    Basophil % 0.6 0.0 - 2.0 %    Neutrophils, Absolute 2.87 2.00 - 8.60 10*3/mm3    Lymphocytes, Absolute 1.41 0.60 - 4.20 10*3/mm3    Monocytes, Absolute 0.46 0.00 - 0.90 10*3/mm3    Eosinophils, Absolute 0.14 0.00 - 0.70 10*3/mm3    Basophils, Absolute 0.03 0.00 - 0.20 10*3/mm3     *Note: Due to a large number of results and/or encounters for the requested time period, some results have not been displayed. A complete set of results can be found in Results Review.

## 2020-06-05 ENCOUNTER — OFFICE VISIT (OUTPATIENT)
Dept: CARDIOLOGY | Facility: CLINIC | Age: 60
End: 2020-06-05

## 2020-06-05 VITALS
HEIGHT: 71 IN | BODY MASS INDEX: 39.98 KG/M2 | OXYGEN SATURATION: 97 % | HEART RATE: 68 BPM | DIASTOLIC BLOOD PRESSURE: 82 MMHG | SYSTOLIC BLOOD PRESSURE: 134 MMHG | WEIGHT: 285.6 LBS

## 2020-06-05 DIAGNOSIS — R55 SYNCOPE AND COLLAPSE: ICD-10-CM

## 2020-06-05 DIAGNOSIS — I10 ESSENTIAL HYPERTENSION: ICD-10-CM

## 2020-06-05 DIAGNOSIS — R00.2 PALPITATIONS: Primary | ICD-10-CM

## 2020-06-05 PROCEDURE — 99214 OFFICE O/P EST MOD 30 MIN: CPT | Performed by: INTERNAL MEDICINE

## 2020-06-05 NOTE — PROGRESS NOTES
Cy Blair  60 y.o. male    06/05/2020  Palpitations      History of Present Illness  Patient originally came first for palpitations syncope.  Due to the COVID-19 and then his schedule he never had the echo done.  We did do a Holter monitor which did show ectopy and start him on Toprol he says this is helped very much.  He does state that he is to complete syncopal episodes of went away but he did get dizzy about a week ago.  He has no increased shortness of air.  He has no angina per se but I am somewhat concerned that this dizziness could be coronary disease.  He has a family history of possible MI also has hypertension.   -        SUBJECTIVE    No Known Allergies      Past Medical History:   Diagnosis Date   • Candidiasis of skin     Candidiasis of skin - has flareups during the summer   • Essential (primary) hypertension          Past Surgical History:   Procedure Laterality Date   • KIDNEY STONE SURGERY     • NECK SURGERY      for herniated discs and spinal stenosis         Family History   Problem Relation Age of Onset   • Heart attack Mother 81        POSSIBLE MI   • Heart disease Father    • Sudden death Other    • Hypertension Brother    • Dementia Maternal Grandmother    • No Known Problems Maternal Grandfather    • No Known Problems Paternal Grandmother    • Clotting disorder Paternal Grandfather          Social History     Socioeconomic History   • Marital status:      Spouse name: Not on file   • Number of children: Not on file   • Years of education: Not on file   • Highest education level: Not on file   Tobacco Use   • Smoking status: Never Smoker   • Smokeless tobacco: Never Used   Substance and Sexual Activity   • Alcohol use: No   • Drug use: No   • Sexual activity: Defer         Prior to Admission medications    Medication Sig Start Date End Date Taking? Authorizing Provider   aspirin 81 MG chewable tablet Chew 81 mg Daily.   Yes Provider, MD Fernando   lisinopril  "(PRINIVIL,ZESTRIL) 40 MG tablet TAKE 1 TABLET DAILY 3/23/20  Yes Sarah Gilbert APRN   losartan-hydrochlorothiazide (HYZAAR) 100-12.5 MG per tablet TAKE 1 TABLET DAILY 2/27/20  Yes Sarah Gilbert APRN   metoprolol succinate XL (Toprol XL) 25 MG 24 hr tablet Take a 0.5 tablet once daily. 4/10/20  Yes Kate Santiago MD   Omega-3 Fatty Acids (FISH OIL BURP-LESS) 1200 MG capsule Take  by mouth.   Yes ProviderFernando MD   omeprazole (priLOSEC) 40 MG capsule Take 1 capsule by mouth Daily. 4/16/20  Yes Robyn Dennis APRN   Htthwv-OmYqb-SaWxks-FA-Omega (MULTIVITAMIN/MINERALS PO) Med Name: multivitamin & minerals #11-folic acid oral   Yes ProviderFernando MD   vitamin C (ASCORBIC ACID) 500 MG tablet Take 500 mg by mouth Daily.   Yes Emergency, Nurse Epic, RN   senna-docusate (SENOKOT S) 8.6-50 MG per tablet 1-2 tablets Daily.    Provider, MD Fernando   sucralfate (Carafate) 1 g tablet Take 1 tablet by mouth 4 (Four) Times a Day. 4/15/20   Robyn Dennis APRN         OBJECTIVE    /82   Pulse 68   Ht 180.3 cm (71\")   Wt 130 kg (285 lb 9.6 oz)   SpO2 97%   BMI 39.83 kg/m²         Review of Systems     Constitutional:  Denies recent weight loss, weight gain, fever or chills, no change in exercise tolerance     HENT:  Denies any hearing loss, epistaxis, hoarseness, or difficulty speaking.     Eyes: Wears eyeglasses or contact lenses     Respiratory:  Denies dyspnea with exertion,no cough, wheezing, or hemoptysis.     Cardiovascular: Negative for palpations, chest pain, orthopnea, PND, peripheral edema, syncope, or claudication.     Gastrointestinal:  Denies change in bowel habits, dyspepsia, ulcer disease, hematochezia, or melena.     Endocrine: Negative for cold intolerance, heat intolerance, polydipsia, polyphagia and polyuria. Denies any history of weight change, heat/cold intolerance, polydipsia, polyuria     Genitourinary: Negative.      Musculoskeletal: Denies any history of arthritic " symptoms or back problems     Skin:  Denies any change in hair or nails, rashes, or skin lesions.     Allergic/Immunologic: Negative.  Negative for environmental allergies, food allergies and immunocompromised state.     Neurological:  Denies any history of recurrent headaches, strokes, TIA, or seizure disorder.     Hematological: Denies any food allergies, seasonal allergies, bleeding disorders, or lymphadenopathy.     Psychiatric/Behavioral: Denies any history of depression, substance abuse, or change in cognitive function.         Physical Exam     Constitutional: Cooperative, alert and oriented, well-developed, well-nourished, in no acute distress.     HENT:   Head: Normocephalic, normal hair patterns, no masses or tenderness.  Ears, Nose, and Throat: No gross abnormalities. No pallor or cyanosis. Dentition good.   Eyes: EOMS intact, PERRL, conjunctivae and lids unremarkable. Fundoscopic exam and visual fields not performed.   Neck: No palpable masses or adenopathy, no thyromegaly, no JVD, carotid pulses are full and equal bilaterally and without  Bruits.     Cardiovascular: Regular rhythm, S1 and S2 normal, no S3 or S4. Apical impulse not displaced. No murmurs, gallops, or rubs detected.     Pulmonary/Chest: Chest: normal symmetry, no tenderness to palpation, normal respiratory excursion, no intercostal retraction, no use of accessory muscles.            Pulmonary: Normal breath sounds. No rales or ronchi.    Abdominal: Abdomen soft, bowel sounds normoactive, no masses, no hepatosplenomegaly, non-tender, no bruits.     Musculoskeletal: No deformities, clubbing, cyanosis, erythema, or edema observed. There are no spinal abnormalities noted. Normal muscle strength and tone. Pulses full and equal in all extremities, no bruits auscultated.     Neurological: No gross motor or sensory deficits noted, affect appropriate, oriented to time, person, place.     Skin: Warm and dry to the touch, no apparent skin lesions or  masses noted.     Psychiatric: He has a normal mood and affect. His behavior is normal. Judgment and thought content normal.         Procedures      Lab Results   Component Value Date    WBC 7.16 12/20/2019    HGB 16.0 12/20/2019    HCT 44.2 12/20/2019    MCV 85.2 12/20/2019     12/20/2019     Lab Results   Component Value Date    GLUCOSE 98 12/20/2019    BUN 15 12/20/2019    CREATININE 1.10 12/20/2019    EGFRIFNONA 68 12/20/2019    BCR 13.6 12/20/2019    CO2 30.0 12/20/2019    CALCIUM 10.2 12/20/2019    ALBUMIN 4.90 12/20/2019    AST 23 12/20/2019    ALT 19 12/20/2019     Lab Results   Component Value Date    CHOL 150 12/20/2019    CHOL 138 (L) 06/28/2018    CHOL 150 09/13/2017     Lab Results   Component Value Date    TRIG 97 12/20/2019    TRIG 110 06/28/2018    TRIG 145 09/13/2017     Lab Results   Component Value Date    HDL 42 12/20/2019    HDL 39 (L) 06/28/2018    HDL 39 09/13/2017     No components found for: LDLCALC  Lab Results   Component Value Date    LDL 89 12/20/2019    LDL 77 06/28/2018    LDL 82 09/13/2017     No results found for: HDLLDLRATIO  No components found for: CHOLHDL  Lab Results   Component Value Date    HGBA1C 5.29 12/20/2019     Lab Results   Component Value Date    TSH 1.760 12/20/2019           ASSESSMENT AND PLAN      Diagnoses and all orders for this visit:    Palpitations  This has resolved with the Toprol.  We will go ahead and have him complete his echo and I would like to get a treadmill test.  -     Treadmill Stress Test    Syncope and collapse  See above.  -     Treadmill Stress Test    Essential hypertension  Under control.  -     Treadmill Stress Test        Patient's Body mass index is 39.83 kg/m². BMI is above normal parameters. Recommendations include: none (medical contraindication).                Kate Santiago MD  6/5/2020  12:01

## 2020-06-10 LAB
BH CV ECHO MEAS - ACS: 2.6 CM
BH CV ECHO MEAS - AO MAX PG (FULL): 1.9 MMHG
BH CV ECHO MEAS - AO MAX PG: 9.7 MMHG
BH CV ECHO MEAS - AO MEAN PG (FULL): 1 MMHG
BH CV ECHO MEAS - AO MEAN PG: 5 MMHG
BH CV ECHO MEAS - AO ROOT AREA (BSA CORRECTED): 1.4
BH CV ECHO MEAS - AO ROOT AREA: 9.6 CM^2
BH CV ECHO MEAS - AO ROOT DIAM: 3.5 CM
BH CV ECHO MEAS - AO V2 MAX: 156 CM/SEC
BH CV ECHO MEAS - AO V2 MEAN: 105 CM/SEC
BH CV ECHO MEAS - AO V2 VTI: 29.9 CM
BH CV ECHO MEAS - ASC AORTA: 3.2 CM
BH CV ECHO MEAS - AVA(I,A): 5.7 CM^2
BH CV ECHO MEAS - AVA(I,D): 5.7 CM^2
BH CV ECHO MEAS - AVA(V,A): 6.3 CM^2
BH CV ECHO MEAS - AVA(V,D): 6.3 CM^2
BH CV ECHO MEAS - BSA(HAYCOCK): 2.6 M^2
BH CV ECHO MEAS - BSA: 2.5 M^2
BH CV ECHO MEAS - BZI_BMI: 39.7 KILOGRAMS/M^2
BH CV ECHO MEAS - BZI_METRIC_HEIGHT: 180.3 CM
BH CV ECHO MEAS - BZI_METRIC_WEIGHT: 129.3 KG
BH CV ECHO MEAS - EDV(CUBED): 140.6 ML
BH CV ECHO MEAS - EDV(MOD-SP2): 96.6 ML
BH CV ECHO MEAS - EDV(MOD-SP4): 116 ML
BH CV ECHO MEAS - EDV(TEICH): 129.5 ML
BH CV ECHO MEAS - EF(CUBED): 83.4 %
BH CV ECHO MEAS - EF(MOD-SP2): 49.1 %
BH CV ECHO MEAS - EF(MOD-SP4): 65 %
BH CV ECHO MEAS - EF(TEICH): 76 %
BH CV ECHO MEAS - ESV(CUBED): 23.4 ML
BH CV ECHO MEAS - ESV(MOD-SP2): 49.2 ML
BH CV ECHO MEAS - ESV(MOD-SP4): 40.6 ML
BH CV ECHO MEAS - ESV(TEICH): 31.1 ML
BH CV ECHO MEAS - FS: 45 %
BH CV ECHO MEAS - IVS/LVPW: 0.67
BH CV ECHO MEAS - IVSD: 0.83 CM
BH CV ECHO MEAS - LA DIMENSION: 4.1 CM
BH CV ECHO MEAS - LA/AO: 1.2
BH CV ECHO MEAS - LV DIASTOLIC VOL/BSA (35-75): 47.3 ML/M^2
BH CV ECHO MEAS - LV MASS(C)D: 203.4 GRAMS
BH CV ECHO MEAS - LV MASS(C)DI: 83 GRAMS/M^2
BH CV ECHO MEAS - LV MAX PG: 7.8 MMHG
BH CV ECHO MEAS - LV MEAN PG: 4 MMHG
BH CV ECHO MEAS - LV SYSTOLIC VOL/BSA (12-30): 16.6 ML/M^2
BH CV ECHO MEAS - LV V1 MAX: 140 CM/SEC
BH CV ECHO MEAS - LV V1 MEAN: 89 CM/SEC
BH CV ECHO MEAS - LV V1 VTI: 24.3 CM
BH CV ECHO MEAS - LVIDD: 5.2 CM
BH CV ECHO MEAS - LVIDS: 2.9 CM
BH CV ECHO MEAS - LVLD AP2: 8.8 CM
BH CV ECHO MEAS - LVLD AP4: 8.9 CM
BH CV ECHO MEAS - LVLS AP2: 7.2 CM
BH CV ECHO MEAS - LVLS AP4: 7.7 CM
BH CV ECHO MEAS - LVOT AREA (M): 7.1 CM^2
BH CV ECHO MEAS - LVOT AREA: 7.1 CM^2
BH CV ECHO MEAS - LVOT DIAM: 3 CM
BH CV ECHO MEAS - LVPWD: 1.2 CM
BH CV ECHO MEAS - MR MAX PG: 9.1 MMHG
BH CV ECHO MEAS - MR MAX VEL: 151 CM/SEC
BH CV ECHO MEAS - MV A MAX VEL: 109 CM/SEC
BH CV ECHO MEAS - MV DEC SLOPE: 388 CM/SEC^2
BH CV ECHO MEAS - MV E MAX VEL: 93.6 CM/SEC
BH CV ECHO MEAS - MV E/A: 0.86
BH CV ECHO MEAS - MV MAX PG: 4.8 MMHG
BH CV ECHO MEAS - MV MEAN PG: 2 MMHG
BH CV ECHO MEAS - MV P1/2T MAX VEL: 111 CM/SEC
BH CV ECHO MEAS - MV P1/2T: 83.8 MSEC
BH CV ECHO MEAS - MV V2 MAX: 110 CM/SEC
BH CV ECHO MEAS - MV V2 MEAN: 53.6 CM/SEC
BH CV ECHO MEAS - MV V2 VTI: 39.4 CM
BH CV ECHO MEAS - MVA P1/2T LCG: 2 CM^2
BH CV ECHO MEAS - MVA(P1/2T): 2.6 CM^2
BH CV ECHO MEAS - MVA(VTI): 4.4 CM^2
BH CV ECHO MEAS - PA MAX PG: 3.3 MMHG
BH CV ECHO MEAS - PA MEAN PG: 2 MMHG
BH CV ECHO MEAS - PA V2 MAX: 90.5 CM/SEC
BH CV ECHO MEAS - PA V2 MEAN: 61.3 CM/SEC
BH CV ECHO MEAS - PA V2 VTI: 16.7 CM
BH CV ECHO MEAS - RAP SYSTOLE: 10 MMHG
BH CV ECHO MEAS - RVOT AREA: 8 CM^2
BH CV ECHO MEAS - RVOT DIAM: 3.2 CM
BH CV ECHO MEAS - RVSP: 32.8 MMHG
BH CV ECHO MEAS - SI(AO): 117.3 ML/M^2
BH CV ECHO MEAS - SI(CUBED): 47.8 ML/M^2
BH CV ECHO MEAS - SI(LVOT): 70.1 ML/M^2
BH CV ECHO MEAS - SI(MOD-SP2): 19.3 ML/M^2
BH CV ECHO MEAS - SI(MOD-SP4): 30.8 ML/M^2
BH CV ECHO MEAS - SI(TEICH): 40.1 ML/M^2
BH CV ECHO MEAS - SV(AO): 287.7 ML
BH CV ECHO MEAS - SV(CUBED): 117.2 ML
BH CV ECHO MEAS - SV(LVOT): 171.8 ML
BH CV ECHO MEAS - SV(MOD-SP2): 47.4 ML
BH CV ECHO MEAS - SV(MOD-SP4): 75.4 ML
BH CV ECHO MEAS - SV(TEICH): 98.4 ML
BH CV ECHO MEAS - TR MAX VEL: 239 CM/SEC
MAXIMAL PREDICTED HEART RATE: 158 BPM
STRESS TARGET HR: 134 BPM

## 2020-06-11 ENCOUNTER — TELEPHONE (OUTPATIENT)
Dept: CARDIOLOGY | Facility: CLINIC | Age: 60
End: 2020-06-11

## 2020-06-11 NOTE — TELEPHONE ENCOUNTER
Kate Santiago MD Brown, Karen M, RN             Looks good      Tried to call patient to discuss echo results

## 2020-06-17 ENCOUNTER — TELEPHONE (OUTPATIENT)
Dept: CARDIOLOGY | Facility: CLINIC | Age: 60
End: 2020-06-17

## 2020-06-17 LAB
BH CV STRESS BP STAGE 1: NORMAL
BH CV STRESS DURATION MIN STAGE 1: 3
BH CV STRESS DURATION MIN STAGE 2: 1
BH CV STRESS DURATION SEC STAGE 1: 0
BH CV STRESS DURATION SEC STAGE 2: 43
BH CV STRESS GRADE STAGE 1: 10
BH CV STRESS GRADE STAGE 2: 12
BH CV STRESS HR STAGE 1: 121
BH CV STRESS HR STAGE 2: 148
BH CV STRESS METS STAGE 1: 5
BH CV STRESS METS STAGE 2: 7.5
BH CV STRESS PROTOCOL 1: NORMAL
BH CV STRESS RECOVERY BP: NORMAL MMHG
BH CV STRESS RECOVERY HR: 75 BPM
BH CV STRESS SPEED STAGE 1: 1.7
BH CV STRESS SPEED STAGE 2: 2.5
BH CV STRESS STAGE 1: 1
BH CV STRESS STAGE 2: 2
MAXIMAL PREDICTED HEART RATE: 158 BPM
PERCENT MAX PREDICTED HR: 93.67 %
STRESS BASELINE BP: NORMAL MMHG
STRESS BASELINE HR: 67 BPM
STRESS PERCENT HR: 110 %
STRESS POST EXERCISE DUR MIN: 4 MIN
STRESS POST EXERCISE DUR SEC: 42 SEC
STRESS POST PEAK BP: NORMAL MMHG
STRESS POST PEAK HR: 148 BPM
STRESS TARGET HR: 134 BPM

## 2020-06-17 NOTE — TELEPHONE ENCOUNTER
echo   Received: 1 week ago   Message Contents   Kate Santiago MD Brown, Karen M, RN             Looks good      tst   Received: Today   Message Contents   Kate Santiago MD Brown, Karen M, RN             Let pt know tmt show low chance for cad     Called patient. Unable to reach . Left message to call me

## 2020-08-17 RX ORDER — LOSARTAN POTASSIUM AND HYDROCHLOROTHIAZIDE 12.5; 1 MG/1; MG/1
TABLET ORAL
Qty: 90 TABLET | Refills: 1 | Status: SHIPPED | OUTPATIENT
Start: 2020-08-17 | End: 2020-10-13

## 2020-09-30 DIAGNOSIS — I10 ESSENTIAL HYPERTENSION: ICD-10-CM

## 2020-09-30 RX ORDER — LISINOPRIL 40 MG/1
TABLET ORAL
Qty: 90 TABLET | Refills: 1 | Status: SHIPPED | OUTPATIENT
Start: 2020-09-30 | End: 2020-11-03

## 2020-10-12 ENCOUNTER — OFFICE VISIT (OUTPATIENT)
Dept: FAMILY MEDICINE CLINIC | Facility: CLINIC | Age: 60
End: 2020-10-12

## 2020-10-12 VITALS
DIASTOLIC BLOOD PRESSURE: 90 MMHG | OXYGEN SATURATION: 99 % | TEMPERATURE: 98.8 F | HEART RATE: 75 BPM | HEIGHT: 71 IN | BODY MASS INDEX: 39.9 KG/M2 | SYSTOLIC BLOOD PRESSURE: 138 MMHG | WEIGHT: 285 LBS

## 2020-10-12 DIAGNOSIS — G47.9 SLEEP DISORDER: Primary | ICD-10-CM

## 2020-10-12 DIAGNOSIS — I10 ESSENTIAL HYPERTENSION: ICD-10-CM

## 2020-10-12 DIAGNOSIS — N50.9 TESTICLE TROUBLE: ICD-10-CM

## 2020-10-12 PROCEDURE — 99213 OFFICE O/P EST LOW 20 MIN: CPT | Performed by: NURSE PRACTITIONER

## 2020-10-12 RX ORDER — TRAZODONE HYDROCHLORIDE 50 MG/1
TABLET ORAL
Qty: 30 TABLET | Refills: 2 | Status: SHIPPED | OUTPATIENT
Start: 2020-10-12 | End: 2021-03-05

## 2020-10-12 RX ORDER — DIPHENHYDRAMINE HCL 25 MG
25 TABLET ORAL EVERY 6 HOURS PRN
COMMUNITY
End: 2021-02-08

## 2020-10-13 ENCOUNTER — OFFICE VISIT (OUTPATIENT)
Dept: CARDIOLOGY | Facility: CLINIC | Age: 60
End: 2020-10-13

## 2020-10-13 VITALS
BODY MASS INDEX: 40.46 KG/M2 | OXYGEN SATURATION: 99 % | DIASTOLIC BLOOD PRESSURE: 88 MMHG | HEIGHT: 71 IN | WEIGHT: 289 LBS | SYSTOLIC BLOOD PRESSURE: 152 MMHG | HEART RATE: 66 BPM

## 2020-10-13 DIAGNOSIS — R00.2 PALPITATIONS: ICD-10-CM

## 2020-10-13 DIAGNOSIS — I10 ESSENTIAL HYPERTENSION: Primary | ICD-10-CM

## 2020-10-13 PROCEDURE — 99214 OFFICE O/P EST MOD 30 MIN: CPT | Performed by: INTERNAL MEDICINE

## 2020-10-13 RX ORDER — AZILSARTAN KAMEDOXOMIL AND CHLORTHALIDONE 40; 25 MG/1; MG/1
1 TABLET ORAL DAILY
Qty: 90 TABLET | Refills: 4 | COMMUNITY
Start: 2020-10-13 | End: 2020-11-03 | Stop reason: SDUPTHER

## 2020-10-13 NOTE — PROGRESS NOTES
Cy Blair  60 y.o. male    10/13/2020  Chief Complaint   Patient presents with   • Follow-up   • Hypertension       History of Present Illness  Patient main complaint is hypertension palpitations    -        SUBJECTIVE  Patient has had somewhat difficult to control hypertension.  We have started him on metoprolol for palpitations.  Since that time he says his blood pressure does go up.  Overall the metoprolol has seemed to help however he has had some nighttime where he wakes up with his heart racing.  He has no angina.  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  This is secondary to his going from evening to daytime.  He has been started on a trazodone to take to help his sleep.  He usually takes his metoprolol in the morning.  No Known Allergies      Past Medical History:   Diagnosis Date   • Candidiasis of skin     Candidiasis of skin - has flareups during the summer   • Essential (primary) hypertension          Past Surgical History:   Procedure Laterality Date   • KIDNEY STONE SURGERY     • NECK SURGERY      for herniated discs and spinal stenosis         Family History   Problem Relation Age of Onset   • Heart attack Mother 81        POSSIBLE MI   • Heart disease Father    • Sudden death Other    • Hypertension Brother    • Dementia Maternal Grandmother    • No Known Problems Maternal Grandfather    • No Known Problems Paternal Grandmother    • Clotting disorder Paternal Grandfather          Social History     Socioeconomic History   • Marital status:      Spouse name: Not on file   • Number of children: Not on file   • Years of education: Not on file   • Highest education level: Not on file   Tobacco Use   • Smoking status: Never Smoker   • Smokeless tobacco: Never Used   Substance and Sexual Activity   • Alcohol use: No   • Drug use: No   • Sexual activity: Defer         Prior to Admission medications    Medication Sig Start Date End Date Taking? Authorizing Provider  "  aspirin 81 MG chewable tablet Chew 81 mg Daily.   Yes ProviderFernando MD   diphenhydrAMINE (BENADRYL) 25 MG tablet Take 25 mg by mouth Every 6 (Six) Hours As Needed for Itching.   Yes Fernando Saldivar MD   lisinopril (PRINIVIL,ZESTRIL) 40 MG tablet TAKE 1 TABLET DAILY 9/30/20  Yes Sarah Gilbert APRN   metoprolol succinate XL (Toprol XL) 25 MG 24 hr tablet Take a 0.5 tablet once daily. 4/10/20  Yes Kate Santiago MD   Omega-3 Fatty Acids (FISH OIL BURP-LESS) 1200 MG capsule Take  by mouth.   Yes ProviderFernando MD   omeprazole (priLOSEC) 40 MG capsule Take 1 capsule by mouth Daily. 4/16/20  Yes Robyn Dennis APRN   Tegycg-ZvQrp-ExTmqi-FA-Omega (MULTIVITAMIN/MINERALS PO) Med Name: multivitamin & minerals #11-folic acid oral   Yes ProviderFernando MD   senna-docusate (SENOKOT S) 8.6-50 MG per tablet 1-2 tablets Daily.   Yes ProviderFernando MD   sucralfate (Carafate) 1 g tablet Take 1 tablet by mouth 4 (Four) Times a Day. 4/15/20  Yes Robyn Dennis APRN   traZODone (DESYREL) 50 MG tablet May take one-half to one tablet by mouth 30 minutes prior to bed as needed for help with sleep. 10/12/20  Yes Sarah Gilbert APRN   vitamin C (ASCORBIC ACID) 500 MG tablet Take 500 mg by mouth Daily.   Yes Emergency, Nurse Epic, RN   losartan-hydrochlorothiazide (HYZAAR) 100-12.5 MG per tablet TAKE 1 TABLET DAILY 8/17/20 10/13/20 Yes Sarah Gilbert APRN   Azilsartan-Chlorthalidone (Edarbyclor) 40-25 MG tablet Take 1 tablet by mouth Daily. 10/13/20   Kate Santiago MD         OBJECTIVE    /88 (BP Location: Right arm, Patient Position: Sitting)   Pulse 66   Ht 180.3 cm (71\")   Wt 131 kg (289 lb)   SpO2 99%   BMI 40.31 kg/m²         Review of Systems     Constitutional:  Denies recent weight loss, weight gain, fever or chills, no change in exercise tolerance     HENT:  Denies any hearing loss, epistaxis, hoarseness, or difficulty speaking.     Eyes: Wears eyeglasses or contact " lenses     Respiratory:  Denies dyspnea with exertion,no cough, wheezing, or hemoptysis.     Cardiovascular: Negative for palpations, chest pain, orthopnea, PND, peripheral edema, syncope, or claudication.     Gastrointestinal:  Denies change in bowel habits, dyspepsia, ulcer disease, hematochezia, or melena.     Endocrine: Negative for cold intolerance, heat intolerance, polydipsia, polyphagia and polyuria. Denies any history of weight change, heat/cold intolerance, polydipsia, polyuria     Genitourinary: Negative.      Musculoskeletal: Denies any history of arthritic symptoms or back problems     Skin:  Denies any change in hair or nails, rashes, or skin lesions.     Allergic/Immunologic: Negative.  Negative for environmental allergies, food allergies and immunocompromised state.     Neurological:  Denies any history of recurrent headaches, strokes, TIA, or seizure disorder.     Hematological: Denies any food allergies, seasonal allergies, bleeding disorders, or lymphadenopathy.     Psychiatric/Behavioral: Denies any history of depression, substance abuse, or change in cognitive function.         Physical Exam     Constitutional: Cooperative, alert and oriented, well-developed, well-nourished, in no acute distress.     HENT:   Head: Normocephalic, normal hair patterns, no masses or tenderness.  Ears, Nose, and Throat: No gross abnormalities. No pallor or cyanosis. Dentition good.   Eyes: EOMS intact, PERRL, conjunctivae and lids unremarkable. Fundoscopic exam and visual fields not performed.   Neck: No palpable masses or adenopathy, no thyromegaly, no JVD, carotid pulses are full and equal bilaterally and without  Bruits.     Cardiovascular: Regular rhythm, S1 and S2 normal, no S3 or S4. Apical impulse not displaced. No murmurs, gallops, or rubs detected.     Pulmonary/Chest: Chest: normal symmetry, no tenderness to palpation, normal respiratory excursion, no intercostal retraction, no use of accessory muscles.             Pulmonary: Normal breath sounds. No rales or ronchi.    Abdominal: Abdomen soft, bowel sounds normoactive, no masses, no hepatosplenomegaly, non-tender, no bruits.     Musculoskeletal: No deformities, clubbing, cyanosis, erythema, or edema observed. There are no spinal abnormalities noted. Normal muscle strength and tone. Pulses full and equal in all extremities, no bruits auscultated.     Neurological: No gross motor or sensory deficits noted, affect appropriate, oriented to time, person, place.     Skin: Warm and dry to the touch, no apparent skin lesions or masses noted.     Psychiatric: He has a normal mood and affect. His behavior is normal. Judgment and thought content normal.         Procedures      Lab Results   Component Value Date    WBC 7.16 12/20/2019    HGB 16.0 12/20/2019    HCT 44.2 12/20/2019    MCV 85.2 12/20/2019     12/20/2019     Lab Results   Component Value Date    GLUCOSE 98 12/20/2019    BUN 15 12/20/2019    CREATININE 1.10 12/20/2019    EGFRIFNONA 68 12/20/2019    BCR 13.6 12/20/2019    CO2 30.0 12/20/2019    CALCIUM 10.2 12/20/2019    ALBUMIN 4.90 12/20/2019    AST 23 12/20/2019    ALT 19 12/20/2019     Lab Results   Component Value Date    CHOL 150 12/20/2019    CHOL 138 (L) 06/28/2018    CHOL 150 09/13/2017     Lab Results   Component Value Date    TRIG 97 12/20/2019    TRIG 110 06/28/2018    TRIG 145 09/13/2017     Lab Results   Component Value Date    HDL 42 12/20/2019    HDL 39 (L) 06/28/2018    HDL 39 09/13/2017     No components found for: LDLCALC  Lab Results   Component Value Date    LDL 89 12/20/2019    LDL 77 06/28/2018    LDL 82 09/13/2017     No results found for: HDLLDLRATIO  No components found for: CHOLHDL  Lab Results   Component Value Date    HGBA1C 5.29 12/20/2019     Lab Results   Component Value Date    TSH 1.760 12/20/2019           ASSESSMENT AND PLAN      Diagnoses and all orders for this visit:    1. Essential hypertension (Primary)  He still  having trouble controlling his blood pressure.  Think some of it has to do with his sleep habits and having to go from night today shift.  This seems when there is a lot of issue.  He is only on losartan-HCTZ which is the weakest of the ARB so therefore I have given him samples of Edarbyclor.  He will start taking that in the morning.  We will move the metoprolol to sleep time.  Hopefully this will help.  I would like to get him off his lisinopril if possible.  2. Palpitations  This is help with his palpitations except occasionally when he has dreams at night.  Other orders  -     Azilsartan-Chlorthalidone (Edarbyclor) 40-25 MG tablet; Take 1 tablet by mouth Daily.  Dispense: 90 tablet; Refill: 4        Patient's Body mass index is 40.31 kg/m². BMI is above normal parameters. Recommendations include: referral to primary care.                Kate Santiago MD  10/13/2020  11:08 CDT

## 2020-11-02 NOTE — PROGRESS NOTES
Subjective   Cy Blair is a 60 y.o. male who presents to the office complaining of several issues.  First he states he is not sleeping at nighttime and would like something to help him.  He does work night shift.  Second has noticed that his blood pressure is elevated at times while his pulse may be low.  Will see cardio Dr. Santiago tomorrow.    Also states that he's having pressure in his LEFT testicle.  Says that he does have extra weight in his pannus which causes his LEFT testicle to ascend and seems smaller than his RIGHT.  History of Present Illness     The following portions of the patient's history were reviewed and updated as appropriate: allergies, current medications, past family history, past medical history, past social history, past surgical history and problem list.    Review of Systems   Constitutional: Negative for chills, fatigue and fever.   HENT: Negative for congestion, sneezing, sore throat and trouble swallowing.    Eyes: Negative for visual disturbance.   Respiratory: Negative for cough, chest tightness, shortness of breath and wheezing.    Cardiovascular: Negative for chest pain, palpitations and leg swelling.   Gastrointestinal: Negative for abdominal pain, constipation, diarrhea, nausea and vomiting.   Genitourinary: Negative for dysuria, frequency and urgency.   Musculoskeletal: Negative for neck pain.   Skin: Negative for rash.   Neurological: Negative for dizziness, weakness and headaches.   Psychiatric/Behavioral: Positive for sleep disturbance.        In the past two weeks the pt has not felt down, depressed, hopeless or lost interest in doing things   All other systems reviewed and are negative.      Objective   Physical Exam  Vitals signs and nursing note reviewed.   Constitutional:       Appearance: He is well-developed.   HENT:      Head: Normocephalic and atraumatic.      Right Ear: External ear normal.      Left Ear: External ear normal.      Nose: Nose normal.   Eyes:       General: No scleral icterus.        Right eye: No discharge.         Left eye: No discharge.      Conjunctiva/sclera: Conjunctivae normal.      Pupils: Pupils are equal, round, and reactive to light.   Neck:      Musculoskeletal: Normal range of motion and neck supple.      Thyroid: No thyromegaly.   Cardiovascular:      Rate and Rhythm: Normal rate and regular rhythm.      Heart sounds: Normal heart sounds. No murmur. No friction rub. No gallop.    Pulmonary:      Effort: Pulmonary effort is normal. No respiratory distress.      Breath sounds: Normal breath sounds. No wheezing or rales.   Abdominal:      General: Bowel sounds are normal. There is no distension.      Palpations: Abdomen is soft.      Tenderness: There is no abdominal tenderness. There is no guarding or rebound.   Musculoskeletal: Normal range of motion.   Lymphadenopathy:      Cervical: No cervical adenopathy.   Skin:     General: Skin is warm and dry.      Findings: No rash.   Neurological:      Mental Status: He is alert and oriented to person, place, and time.      Cranial Nerves: No cranial nerve deficit.   Psychiatric:         Behavior: Behavior normal.         Thought Content: Thought content normal.         Judgment: Judgment normal.         Assessment/Plan   Diagnoses and all orders for this visit:    1. Sleep disorder (Primary)    2. Testicle trouble  -     US Testicular or Ovarian Vascular Complete; Future    3. Essential hypertension    Other orders  -     traZODone (DESYREL) 50 MG tablet; May take one-half to one tablet by mouth 30 minutes prior to bed as needed for help with sleep.  Dispense: 30 tablet; Refill: 2    Encouraged medications as ordered.  Will send for ultrasound.  Insomnia precautions.               This document has been electronically signed by MARTINA Milan on November 2, 2020 06:36 CST

## 2020-11-03 ENCOUNTER — OFFICE VISIT (OUTPATIENT)
Dept: CARDIOLOGY | Facility: CLINIC | Age: 60
End: 2020-11-03

## 2020-11-03 VITALS
OXYGEN SATURATION: 99 % | DIASTOLIC BLOOD PRESSURE: 80 MMHG | BODY MASS INDEX: 39.93 KG/M2 | SYSTOLIC BLOOD PRESSURE: 118 MMHG | HEIGHT: 71 IN | HEART RATE: 61 BPM | WEIGHT: 285.2 LBS

## 2020-11-03 DIAGNOSIS — R00.2 PALPITATIONS: Primary | ICD-10-CM

## 2020-11-03 DIAGNOSIS — R55 SYNCOPE AND COLLAPSE: ICD-10-CM

## 2020-11-03 DIAGNOSIS — I10 ESSENTIAL HYPERTENSION: ICD-10-CM

## 2020-11-03 PROCEDURE — 93000 ELECTROCARDIOGRAM COMPLETE: CPT | Performed by: INTERNAL MEDICINE

## 2020-11-03 PROCEDURE — 99215 OFFICE O/P EST HI 40 MIN: CPT | Performed by: INTERNAL MEDICINE

## 2020-11-03 RX ORDER — AZILSARTAN KAMEDOXOMIL AND CHLORTHALIDONE 40; 25 MG/1; MG/1
1 TABLET ORAL DAILY
Qty: 30 TABLET | Refills: 11 | COMMUNITY
Start: 2020-11-03 | End: 2021-03-01 | Stop reason: SDUPTHER

## 2020-11-03 RX ORDER — METOPROLOL SUCCINATE 25 MG/1
TABLET, EXTENDED RELEASE ORAL
Qty: 30 TABLET | Refills: 3 | Status: SHIPPED | OUTPATIENT
Start: 2020-11-03 | End: 2021-04-16

## 2020-11-03 NOTE — PROGRESS NOTES
Cy Blair  60 y.o. male    11/03/2020  Chief Complaint   Patient presents with   • Hypertension   • Palpitations       History of Present Illness  History of high blood pressure     -        SUBJECTIVE  Patient is here with a blood pressure list.  He has had a couple episodes that his blood pressures drop.  He was recently taking lisinopril with his Edarbi chlor and we stopped that.  He has had one other episode.  It appears to happen while he is at work.  He works a night shift and due to his sleep habits sometimes he doubles his medicine for his timeframe within 24 hours.  We went over this.  We took approximately 45 minutes to go through his medication regimen and timing.  He has dizziness when he had this at times but no true syncope.  No Known Allergies      Past Medical History:   Diagnosis Date   • Candidiasis of skin     Candidiasis of skin - has flareups during the summer   • Essential (primary) hypertension          Past Surgical History:   Procedure Laterality Date   • KIDNEY STONE SURGERY     • NECK SURGERY      for herniated discs and spinal stenosis         Family History   Problem Relation Age of Onset   • Heart attack Mother 81        POSSIBLE MI   • Heart disease Father    • Sudden death Other    • Hypertension Brother    • Dementia Maternal Grandmother    • No Known Problems Maternal Grandfather    • No Known Problems Paternal Grandmother    • Clotting disorder Paternal Grandfather          Social History     Socioeconomic History   • Marital status:      Spouse name: Not on file   • Number of children: Not on file   • Years of education: Not on file   • Highest education level: Not on file   Tobacco Use   • Smoking status: Never Smoker   • Smokeless tobacco: Never Used   Substance and Sexual Activity   • Alcohol use: No   • Drug use: No   • Sexual activity: Defer         Prior to Admission medications    Medication Sig Start Date End Date Taking? Authorizing Provider   aspirin 81  "MG chewable tablet Chew 81 mg Daily.   Yes ProviderFernando MD   Azilsartan-Chlorthalidone (Edarbyclor) 40-25 MG tablet Take 1 tablet by mouth Daily. 10/13/20  Yes Kate Santiago MD   diphenhydrAMINE (BENADRYL) 25 MG tablet Take 25 mg by mouth Every 6 (Six) Hours As Needed for Itching.   Yes Fernando Saldivar MD   metoprolol succinate XL (Toprol XL) 25 MG 24 hr tablet Take a 0.5 tablet once daily. 4/10/20  Yes Kate Santiago MD   Omega-3 Fatty Acids (FISH OIL BURP-LESS) 1200 MG capsule Take  by mouth.   Yes ProviderFernando MD   omeprazole (priLOSEC) 40 MG capsule Take 1 capsule by mouth Daily. 4/16/20  Yes Robyn Dennis APRN   Toylcx-LoKgw-XfXhbh-FA-Omega (MULTIVITAMIN/MINERALS PO) Med Name: multivitamin & minerals #11-folic acid oral   Yes ProviderFernando MD   senna-docusate (SENOKOT S) 8.6-50 MG per tablet 1-2 tablets Daily.   Yes ProviderFernando MD   traZODone (DESYREL) 50 MG tablet May take one-half to one tablet by mouth 30 minutes prior to bed as needed for help with sleep. 10/12/20  Yes Sarah Gilbert APRN   vitamin C (ASCORBIC ACID) 500 MG tablet Take 500 mg by mouth Daily.   Yes Emergency, Nurse Chioma, RN   lisinopril (PRINIVIL,ZESTRIL) 40 MG tablet TAKE 1 TABLET DAILY 9/30/20   Sarah Gilbert APRN   sucralfate (Carafate) 1 g tablet Take 1 tablet by mouth 4 (Four) Times a Day. 4/15/20   Robyn Dennis APRN         OBJECTIVE    /80 (BP Location: Right arm, Patient Position: Sitting)   Pulse 61   Ht 180.3 cm (71\")   Wt 129 kg (285 lb 3.2 oz)   SpO2 99%   BMI 39.78 kg/m²         Review of Systems     Constitutional:  Denies recent weight loss, weight gain, fever or chills, no change in exercise tolerance     HENT:  Denies any hearing loss, epistaxis, hoarseness, or difficulty speaking.     Eyes: Wears eyeglasses or contact lenses     Respiratory:  Denies dyspnea with exertion,no cough, wheezing, or hemoptysis.     Cardiovascular: Negative for palpations, " chest pain, orthopnea, PND, peripheral edema, syncope, or claudication.     Gastrointestinal:  Denies change in bowel habits, dyspepsia, ulcer disease, hematochezia, or melena.     Endocrine: Negative for cold intolerance, heat intolerance, polydipsia, polyphagia and polyuria. Denies any history of weight change, heat/cold intolerance, polydipsia, polyuria     Genitourinary: Negative.      Musculoskeletal: Denies any history of arthritic symptoms or back problems     Skin:  Denies any change in hair or nails, rashes, or skin lesions.     Allergic/Immunologic: Negative.  Negative for environmental allergies, food allergies and immunocompromised state.     Neurological:  Denies any history of recurrent headaches, strokes, TIA, or seizure disorder.     Hematological: Denies any food allergies, seasonal allergies, bleeding disorders, or lymphadenopathy.     Psychiatric/Behavioral: Denies any history of depression, substance abuse, or change in cognitive function.         Physical Exam     Constitutional: Cooperative, alert and oriented, well-developed, well-nourished, in no acute distress.     HENT:   Head: Normocephalic, normal hair patterns, no masses or tenderness.  Ears, Nose, and Throat: No gross abnormalities. No pallor or cyanosis. Dentition good.   Eyes: EOMS intact, PERRL, conjunctivae and lids unremarkable. Fundoscopic exam and visual fields not performed.   Neck: No palpable masses or adenopathy, no thyromegaly, no JVD, carotid pulses are full and equal bilaterally and without  Bruits.     Cardiovascular: Regular rhythm, S1 and S2 normal, no S3 or S4. Apical impulse not displaced. No murmurs, gallops, or rubs detected.     Pulmonary/Chest: Chest: normal symmetry, no tenderness to palpation, normal respiratory excursion, no intercostal retraction, no use of accessory muscles.            Pulmonary: Normal breath sounds. No rales or ronchi.    Abdominal: Abdomen soft, bowel sounds normoactive, no masses, no  hepatosplenomegaly, non-tender, no bruits.     Musculoskeletal: No deformities, clubbing, cyanosis, erythema, or edema observed. There are no spinal abnormalities noted. Normal muscle strength and tone. Pulses full and equal in all extremities, no bruits auscultated.     Neurological: No gross motor or sensory deficits noted, affect appropriate, oriented to time, person, place.     Skin: Warm and dry to the touch, no apparent skin lesions or masses noted.     Psychiatric: He has a normal mood and affect. His behavior is normal. Judgment and thought content normal.         Procedures      Lab Results   Component Value Date    WBC 7.16 12/20/2019    HGB 16.0 12/20/2019    HCT 44.2 12/20/2019    MCV 85.2 12/20/2019     12/20/2019     Lab Results   Component Value Date    GLUCOSE 98 12/20/2019    BUN 15 12/20/2019    CREATININE 1.10 12/20/2019    EGFRIFNONA 68 12/20/2019    BCR 13.6 12/20/2019    CO2 30.0 12/20/2019    CALCIUM 10.2 12/20/2019    ALBUMIN 4.90 12/20/2019    AST 23 12/20/2019    ALT 19 12/20/2019     Lab Results   Component Value Date    CHOL 150 12/20/2019    CHOL 138 (L) 06/28/2018    CHOL 150 09/13/2017     Lab Results   Component Value Date    TRIG 97 12/20/2019    TRIG 110 06/28/2018    TRIG 145 09/13/2017     Lab Results   Component Value Date    HDL 42 12/20/2019    HDL 39 (L) 06/28/2018    HDL 39 09/13/2017     No components found for: LDLCALC  Lab Results   Component Value Date    LDL 89 12/20/2019    LDL 77 06/28/2018    LDL 82 09/13/2017     No results found for: HDLLDLRATIO  No components found for: CHOLHDL  Lab Results   Component Value Date    HGBA1C 5.29 12/20/2019     Lab Results   Component Value Date    TSH 1.760 12/20/2019           ASSESSMENT AND PLAN      Diagnoses and all orders for this visit:    1. Palpitations (Primary)  These had resolved primarily until he had a change in timing of his medicine.  He is still feeling as if his heart racing at times.  -     ECG 12 Lead  -      Mobile Cardiac Outpatient Telemetry; Future    2. Syncope and collapse  I think this is related to taking both the ACE and ARB along with a beta-blocker also due to his shift changes.  He has stopped his ACE inhibitor and we will change his ARB which is Edarbyclor with chlorthalidone to the days that he works he is only going to take 12.5 mg of the chlorthalidone instead of 25 mg to see if that helps.  -     Mobile Cardiac Outpatient Telemetry; Future    3. Essential hypertension  Overall his blood pressure is in stable condition.  Other orders  -     Azilsartan-Chlorthalidone (Edarbyclor) 40-25 MG tablet; Take 1 tablet by mouth Daily.  Dispense: 30 tablet; Refill: 11  -     metoprolol succinate XL (Toprol XL) 25 MG 24 hr tablet; Take a 0.5 tablet once daily.  Dispense: 30 tablet; Refill: 3    Overall visit took approximately 1 hour.    Patient's Body mass index is 39.78 kg/m². BMI is above normal parameters. Recommendations include: referral to primary care.                Kate Santiago MD  11/4/2020  12:19 CST

## 2020-11-05 LAB
QT INTERVAL: 398 MS
QTC INTERVAL: 400 MS

## 2020-11-19 ENCOUNTER — TELEPHONE (OUTPATIENT)
Dept: CARDIOLOGY | Facility: CLINIC | Age: 60
End: 2020-11-19

## 2020-11-23 ENCOUNTER — TELEPHONE (OUTPATIENT)
Dept: CARDIOLOGY | Facility: CLINIC | Age: 60
End: 2020-11-23

## 2020-11-23 NOTE — TELEPHONE ENCOUNTER
Attempted to contact patient about setting up an appointment for him to come in and get a new monitor put on and I had to leave a , this patient needs to be scheduled for the [place] [facility]

## 2020-12-01 ENCOUNTER — TELEPHONE (OUTPATIENT)
Dept: CARDIOLOGY | Facility: CLINIC | Age: 60
End: 2020-12-01

## 2020-12-01 NOTE — TELEPHONE ENCOUNTER
Contacted patient about his monitor and he informed me that he is currently stuck in [place] due to his son testing positive for corona. They are being tested again tomorrow and if it comes back negative then they will be on their way back. Patient will call and let me know what the  resuts  are

## 2020-12-18 DIAGNOSIS — R00.2 PALPITATIONS: Primary | ICD-10-CM

## 2021-01-11 ENCOUNTER — TELEPHONE (OUTPATIENT)
Dept: CARDIOLOGY | Facility: CLINIC | Age: 61
End: 2021-01-11

## 2021-01-11 NOTE — TELEPHONE ENCOUNTER
CALLED PATIENT TO LET HIM KNOW SAMPLES OF EDARBYCLOR 40-25 MG WILL BE SENT TO [facility] ON 1/12/21 FOR HIM TO .

## 2021-01-13 ENCOUNTER — TELEPHONE (OUTPATIENT)
Dept: CARDIOLOGY | Facility: CLINIC | Age: 61
End: 2021-01-13

## 2021-01-13 NOTE — TELEPHONE ENCOUNTER
Monitor  Received: Yesterday  Message Contents   Kate Santiago MD Brown, Karen M RN             Tell him occasional extra beats but nothing serious.       Left message

## 2021-02-08 ENCOUNTER — OFFICE VISIT (OUTPATIENT)
Dept: FAMILY MEDICINE CLINIC | Facility: CLINIC | Age: 61
End: 2021-02-08

## 2021-02-08 VITALS
SYSTOLIC BLOOD PRESSURE: 142 MMHG | HEIGHT: 71 IN | BODY MASS INDEX: 39.9 KG/M2 | DIASTOLIC BLOOD PRESSURE: 84 MMHG | OXYGEN SATURATION: 100 % | HEART RATE: 73 BPM | WEIGHT: 285 LBS

## 2021-02-08 DIAGNOSIS — M48.02 CERVICAL STENOSIS OF SPINE: Primary | ICD-10-CM

## 2021-02-08 DIAGNOSIS — Z98.1 HX OF FUSION OF CERVICAL SPINE: ICD-10-CM

## 2021-02-08 DIAGNOSIS — M54.2 CERVICAL SPINE PAIN: ICD-10-CM

## 2021-02-08 PROCEDURE — 99213 OFFICE O/P EST LOW 20 MIN: CPT | Performed by: NURSE PRACTITIONER

## 2021-02-08 NOTE — PROGRESS NOTES
"Subjective   Cy Blair is a 60 y.o. male who presents to the office complaining of acute neck pain over the last six weeks.  Takes Aleve prn which does help to \"calm it down.\"  Is now having numbness down his LEFT arm to his hand, especially when he sleeps.    Has had four neck surgeries, all with the same neurosurgeon in [place] who is now retired.  Last surgery was in 2014.  Says he only has two cervical discs left due to spinal stenosis.  Has four distinct cages with cadaver bones.    History of Present Illness     The following portions of the patient's history were reviewed and updated as appropriate: allergies, current medications, past family history, past medical history, past social history, past surgical history and problem list.    Review of Systems   Constitutional: Negative for chills, fatigue and fever.   HENT: Negative for congestion, sneezing, sore throat and trouble swallowing.    Eyes: Negative for visual disturbance.   Respiratory: Negative for cough, chest tightness, shortness of breath and wheezing.    Cardiovascular: Negative for chest pain, palpitations and leg swelling.   Gastrointestinal: Negative for abdominal pain, constipation, diarrhea, nausea and vomiting.   Genitourinary: Negative for dysuria, frequency and urgency.   Musculoskeletal: Positive for neck pain.   Skin: Negative for rash.   Neurological: Positive for numbness (LEFT ARM). Negative for dizziness, weakness and headaches.   Psychiatric/Behavioral:        In the past two weeks the pt has not felt down, depressed, hopeless or lost interest in doing things   All other systems reviewed and are negative.      Objective   Physical Exam  Vitals signs and nursing note reviewed.   Constitutional:       General: He is not in acute distress.     Appearance: He is well-developed. He is not ill-appearing, toxic-appearing or diaphoretic.   HENT:      Head: Normocephalic and atraumatic.      Right Ear: External ear normal.      " Left Ear: External ear normal.      Nose: Nose normal.   Eyes:      General: No scleral icterus.        Right eye: No discharge.         Left eye: No discharge.      Conjunctiva/sclera: Conjunctivae normal.      Pupils: Pupils are equal, round, and reactive to light.   Neck:      Musculoskeletal: Normal range of motion and neck supple.      Thyroid: No thyromegaly.   Cardiovascular:      Rate and Rhythm: Normal rate and regular rhythm.      Heart sounds: Normal heart sounds. No murmur. No friction rub. No gallop.    Pulmonary:      Effort: Pulmonary effort is normal. No respiratory distress.      Breath sounds: Normal breath sounds. No wheezing or rales.   Abdominal:      General: Bowel sounds are normal. There is no distension.      Palpations: Abdomen is soft.      Tenderness: There is no abdominal tenderness. There is no guarding or rebound.   Musculoskeletal:      Cervical back: He exhibits decreased range of motion, tenderness and bony tenderness.   Lymphadenopathy:      Cervical: No cervical adenopathy.   Skin:     General: Skin is warm and dry.      Capillary Refill: Capillary refill takes less than 2 seconds.      Findings: No rash.   Neurological:      Mental Status: He is alert and oriented to person, place, and time.      GCS: GCS eye subscore is 4. GCS verbal subscore is 5. GCS motor subscore is 6.      Cranial Nerves: No cranial nerve deficit.   Psychiatric:         Behavior: Behavior normal.         Thought Content: Thought content normal.         Judgment: Judgment normal.         Assessment/Plan   Diagnoses and all orders for this visit:    1. Cervical stenosis of spine (Primary)  -     XR Spine Cervical 2 or 3 View; Future  -     MRI Cervical Spine Without Contrast; Future    2. Hx of fusion of cervical spine  -     XR Spine Cervical 2 or 3 View; Future  -     MRI Cervical Spine Without Contrast; Future    3. Cervical spine pain  -     XR Spine Cervical 2 or 3 View; Future  -     MRI Cervical Spine  Without Contrast; Future    Encouraged to take his Aleve with food in order to diminish any GI issues as he does see this specialty.  Heat/ice prn.    Probably neurosurgery referral.               This document has been electronically signed by MARTINA Milan on February 8, 2021 15:12 CST

## 2021-02-09 RX ORDER — TIZANIDINE 4 MG/1
4 TABLET ORAL NIGHTLY PRN
Qty: 30 TABLET | Refills: 0 | Status: SHIPPED | OUTPATIENT
Start: 2021-02-09 | End: 2021-03-05

## 2021-02-25 ENCOUNTER — APPOINTMENT (OUTPATIENT)
Dept: MRI IMAGING | Facility: HOSPITAL | Age: 61
End: 2021-02-25

## 2021-02-26 ENCOUNTER — HOSPITAL ENCOUNTER (OUTPATIENT)
Dept: MRI IMAGING | Facility: HOSPITAL | Age: 61
Discharge: HOME | End: 2021-02-26
Admitting: NURSE PRACTITIONER

## 2021-02-26 DIAGNOSIS — M54.2 CERVICAL SPINE PAIN: ICD-10-CM

## 2021-02-26 DIAGNOSIS — Z98.1 HX OF FUSION OF CERVICAL SPINE: ICD-10-CM

## 2021-02-26 DIAGNOSIS — M48.02 CERVICAL STENOSIS OF SPINE: ICD-10-CM

## 2021-02-26 PROCEDURE — 72141 MRI NECK SPINE W/O DYE: CPT

## 2021-03-01 ENCOUNTER — PRIOR AUTHORIZATION (OUTPATIENT)
Dept: CARDIOLOGY | Facility: CLINIC | Age: 61
End: 2021-03-01

## 2021-03-01 RX ORDER — AZILSARTAN KAMEDOXOMIL AND CHLORTHALIDONE 40; 25 MG/1; MG/1
1 TABLET ORAL DAILY
Qty: 30 TABLET | Refills: 5 | Status: SHIPPED | OUTPATIENT
Start: 2021-03-01 | End: 2021-03-05

## 2021-03-03 RX ORDER — IRBESARTAN AND HYDROCHLOROTHIAZIDE 300; 12.5 MG/1; MG/1
1 TABLET, FILM COATED ORAL DAILY
Qty: 30 TABLET | Refills: 5 | Status: CANCELLED | OUTPATIENT
Start: 2021-03-03

## 2021-03-05 ENCOUNTER — OFFICE VISIT (OUTPATIENT)
Dept: FAMILY MEDICINE CLINIC | Facility: CLINIC | Age: 61
End: 2021-03-05

## 2021-03-05 VITALS
WEIGHT: 285 LBS | SYSTOLIC BLOOD PRESSURE: 121 MMHG | BODY MASS INDEX: 39.9 KG/M2 | HEIGHT: 71 IN | DIASTOLIC BLOOD PRESSURE: 75 MMHG

## 2021-03-05 DIAGNOSIS — Z98.890 H/O CERVICAL DISCECTOMY: ICD-10-CM

## 2021-03-05 DIAGNOSIS — M25.78 SPINAL OSTEOPHYTOSIS: Primary | ICD-10-CM

## 2021-03-05 DIAGNOSIS — G95.89 MYELOMALACIA OF CERVICAL CORD (HCC): ICD-10-CM

## 2021-03-05 PROCEDURE — 99214 OFFICE O/P EST MOD 30 MIN: CPT | Performed by: NURSE PRACTITIONER

## 2021-03-05 RX ORDER — IRBESARTAN AND HYDROCHLOROTHIAZIDE 300; 12.5 MG/1; MG/1
1 TABLET, FILM COATED ORAL DAILY
COMMUNITY
End: 2021-04-16

## 2021-03-05 NOTE — PROGRESS NOTES
Subjective   Cy Blair is a 60 y.o. male who presents to the office for cervical neck pain follow-up.  Does continue to suffer from cervical spine pain.  Pain increases with certain movements and activities.  Has had four neck surgeries with the last one taking place six years ago at [facility] in [place].  Is here to review MRI performed at [facility].  You have chosen to receive care through a telephone visit. Do you consent to use a telephone visit for your medical care today? Yes  I have spent 30 minutes in discussion with this patient.  History of Present Illness     The following portions of the patient's history were reviewed and updated as appropriate: allergies, current medications, past family history, past medical history, past social history, past surgical history and problem list.    Review of Systems   Constitutional: Negative for chills, fatigue and fever.   HENT: Negative for congestion, sneezing, sore throat and trouble swallowing.    Eyes: Negative for visual disturbance.   Respiratory: Negative for cough, chest tightness, shortness of breath and wheezing.    Cardiovascular: Negative for chest pain, palpitations and leg swelling.   Gastrointestinal: Negative for abdominal pain, constipation, diarrhea, nausea and vomiting.   Genitourinary: Negative for dysuria, frequency and urgency.   Musculoskeletal: Positive for neck pain.   Skin: Negative for rash.   Neurological: Negative for dizziness, weakness and headaches.   Psychiatric/Behavioral:        In the past two weeks the pt has not felt down, depressed, hopeless or lost interest in doing things   All other systems reviewed and are negative.      Objective   Physical Exam  Pulmonary:      Effort: Pulmonary effort is normal.   Neurological:      Mental Status: He is alert and oriented to person, place, and time.         Assessment/Plan   Diagnoses and all orders for this visit:    1. Spinal  osteophytosis (Primary)  -     Ambulatory Referral to Neurosurgery    2. Myelomalacia of cervical cord (CMS/HCC)  -     Ambulatory Referral to Neurosurgery    3. H/O cervical discectomy  -     Ambulatory Referral to Neurosurgery    Is certainly encouraged to make position changes slowly.  Refusing any pain medications at this time, will continue with NSAIDs prn.  Will notify my office should the pain worsen and we will cover for pain control.  Will send back to neurosurgery group that performed his surgeries.             This document has been electronically signed by MARTINA Milan on March 5, 2021 09:43 CST

## 2021-04-13 RX ORDER — OMEPRAZOLE 40 MG/1
CAPSULE, DELAYED RELEASE ORAL
Qty: 30 CAPSULE | Refills: 0 | Status: SHIPPED | OUTPATIENT
Start: 2021-04-13

## 2021-04-16 ENCOUNTER — OFFICE VISIT (OUTPATIENT)
Dept: CARDIOLOGY | Facility: CLINIC | Age: 61
End: 2021-04-16

## 2021-04-16 VITALS
HEIGHT: 71 IN | BODY MASS INDEX: 41.72 KG/M2 | WEIGHT: 298 LBS | OXYGEN SATURATION: 98 % | SYSTOLIC BLOOD PRESSURE: 144 MMHG | HEART RATE: 77 BPM | DIASTOLIC BLOOD PRESSURE: 90 MMHG

## 2021-04-16 DIAGNOSIS — R00.2 PALPITATIONS: Primary | ICD-10-CM

## 2021-04-16 DIAGNOSIS — I10 ESSENTIAL HYPERTENSION: ICD-10-CM

## 2021-04-16 PROCEDURE — 99214 OFFICE O/P EST MOD 30 MIN: CPT | Performed by: INTERNAL MEDICINE

## 2021-04-16 RX ORDER — AZILSARTAN KAMEDOXOMIL AND CHLORTHALIDONE 40; 25 MG/1; MG/1
1 TABLET ORAL DAILY
Qty: 90 TABLET | Refills: 3 | Status: SHIPPED | OUTPATIENT
Start: 2021-04-16 | End: 2022-05-04

## 2021-04-16 RX ORDER — METOPROLOL SUCCINATE 25 MG/1
TABLET, EXTENDED RELEASE ORAL
Qty: 30 TABLET | Refills: 3 | Status: SHIPPED | OUTPATIENT
Start: 2021-04-16 | End: 2021-08-24

## 2021-04-16 NOTE — PROGRESS NOTES
Cy Blair  60 y.o. male    04/16/2021  Chief Complaint   Patient presents with   • Palpitations   • Hypertension       History of Present Illness    Hypertension, palpitations  -        SUBJECTIVE  Patient is here today. He had been in [place] for a while during the quarantine. He says since he switch from the Edarbi chlor which was controlling his blood pressure well to the irbesartan HCTZ he is not had good blood pressure control and he has had palpitations return. He is asking to switch back. He is increase his metoprolol succinate to 1 tablet daily and he said that did help control the palpitations. He has no angina. He has no shortness of air.  No Known Allergies      Past Medical History:   Diagnosis Date   • Candidiasis of skin     Candidiasis of skin - has flareups during the summer   • Essential (primary) hypertension          Past Surgical History:   Procedure Laterality Date   • KIDNEY STONE SURGERY     • NECK SURGERY      for herniated discs and spinal stenosis         Family History   Problem Relation Age of Onset   • Heart attack Mother 81        POSSIBLE MI   • Heart disease Father    • Sudden death Other    • Hypertension Brother    • Dementia Maternal Grandmother    • No Known Problems Maternal Grandfather    • No Known Problems Paternal Grandmother    • Clotting disorder Paternal Grandfather          Social History     Socioeconomic History   • Marital status:      Spouse name: Not on file   • Number of children: Not on file   • Years of education: Not on file   • Highest education level: Not on file   Tobacco Use   • Smoking status: Never Smoker   • Smokeless tobacco: Never Used   Vaping Use   • Vaping Use: Never used   Substance and Sexual Activity   • Alcohol use: No   • Drug use: No   • Sexual activity: Defer         Prior to Admission medications    Medication Sig Start Date End Date Taking? Authorizing Provider   aspirin 81 MG chewable tablet Chew 81 mg Daily.   Yes Provider,  "MD Fernando   irbesartan-hydrochlorothiazide (AVALIDE) 300-12.5 MG tablet Take 1 tablet by mouth Daily.   Yes Provider, MD Fernando   metoprolol succinate XL (Toprol XL) 25 MG 24 hr tablet Take a 0.5 tablet once daily.  Patient taking differently: 25 mg. Take a 0.5 tablet once daily. 11/3/20  Yes Kate Santiago MD   Omega-3 Fatty Acids (FISH OIL BURP-LESS) 1200 MG capsule Take  by mouth.   Yes ProviderFernando MD   omeprazole (priLOSEC) 40 MG capsule Take 1 capsule by mouth once daily 4/13/21  Yes Robyn Dennis APRN   Lzkxkx-OvZuf-JjYadu-FA-Omega (MULTIVITAMIN/MINERALS PO) Med Name: multivitamin & minerals #11-folic acid oral   Yes ProviderFernando MD   senna-docusate (SENOKOT S) 8.6-50 MG per tablet 1-2 tablets Daily.   Yes ProviderFernando MD   vitamin C (ASCORBIC ACID) 500 MG tablet Take 1,000 mg by mouth Daily.   Yes Emergency, Nurse Epic, RN         OBJECTIVE    /90   Pulse 77   Ht 180.3 cm (71\")   Wt 135 kg (298 lb)   SpO2 98%   BMI 41.56 kg/m²         Review of Systems     Constitutional:  Denies recent weight loss, weight gain, fever or chills, no change in exercise tolerance     HENT:  Denies any hearing loss, epistaxis, hoarseness, or difficulty speaking.     Eyes: Wears eyeglasses or contact lenses     Respiratory:  Denies dyspnea with exertion,no cough, wheezing, or hemoptysis.     Cardiovascular: Negative for palpations, chest pain, orthopnea, PND, peripheral edema, syncope, or claudication.     Gastrointestinal:  Denies change in bowel habits, dyspepsia, ulcer disease, hematochezia, or melena.     Endocrine: Negative for cold intolerance, heat intolerance, polydipsia, polyphagia and polyuria. Denies any history of weight change, heat/cold intolerance, polydipsia, polyuria     Genitourinary: Negative.      Musculoskeletal: Denies any history of arthritic symptoms or back problems     Skin:  Denies any change in hair or nails, rashes, or skin lesions. "     Allergic/Immunologic: Negative.  Negative for environmental allergies, food allergies and immunocompromised state.     Neurological:  Denies any history of recurrent headaches, strokes, TIA, or seizure disorder.     Hematological: Denies any food allergies, seasonal allergies, bleeding disorders, or lymphadenopathy.     Psychiatric/Behavioral: Denies any history of depression, substance abuse, or change in cognitive function.         Physical Exam     Constitutional: Cooperative, alert and oriented, well-developed, well-nourished, in no acute distress.     HENT:   Head: Normocephalic, normal hair patterns, no masses or tenderness.  Ears, Nose, and Throat: No gross abnormalities. No pallor or cyanosis. Dentition good.   Eyes: EOMS intact, PERRL, conjunctivae and lids unremarkable. Fundoscopic exam and visual fields not performed.   Neck: No palpable masses or adenopathy, no thyromegaly, no JVD, carotid pulses are full and equal bilaterally and without  Bruits.     Cardiovascular: Regular rhythm, S1 and S2 normal, no S3 or S4. Apical impulse not displaced. No murmurs, gallops, or rubs detected.     Pulmonary/Chest: Chest: normal symmetry, no tenderness to palpation, normal respiratory excursion, no intercostal retraction, no use of accessory muscles.            Pulmonary: Normal breath sounds. No rales or ronchi.    Abdominal: Abdomen soft, bowel sounds normoactive, no masses, no hepatosplenomegaly, non-tender, no bruits.     Musculoskeletal: No deformities, clubbing, cyanosis, erythema, or edema observed. There are no spinal abnormalities noted. Normal muscle strength and tone. Pulses full and equal in all extremities, no bruits auscultated.     Neurological: No gross motor or sensory deficits noted, affect appropriate, oriented to time, person, place.     Skin: Warm and dry to the touch, no apparent skin lesions or masses noted.     Psychiatric: He has a normal mood and affect. His behavior is normal. Judgment  and thought content normal.         Procedures      Lab Results   Component Value Date    WBC 7.16 12/20/2019    HGB 16.0 12/20/2019    HCT 44.2 12/20/2019    MCV 85.2 12/20/2019     12/20/2019     Lab Results   Component Value Date    GLUCOSE 98 12/20/2019    BUN 15 12/20/2019    CREATININE 1.10 12/20/2019    EGFRIFNONA 68 12/20/2019    BCR 13.6 12/20/2019    CO2 30.0 12/20/2019    CALCIUM 10.2 12/20/2019    ALBUMIN 4.90 12/20/2019    AST 23 12/20/2019    ALT 19 12/20/2019     Lab Results   Component Value Date    CHOL 150 12/20/2019    CHOL 138 (L) 06/28/2018    CHOL 150 09/13/2017     Lab Results   Component Value Date    TRIG 97 12/20/2019    TRIG 110 06/28/2018    TRIG 145 09/13/2017     Lab Results   Component Value Date    HDL 42 12/20/2019    HDL 39 (L) 06/28/2018    HDL 39 09/13/2017     No components found for: LDLCALC  Lab Results   Component Value Date    LDL 89 12/20/2019    LDL 77 06/28/2018    LDL 82 09/13/2017     No results found for: HDLLDLRATIO  No components found for: CHOLHDL  Lab Results   Component Value Date    HGBA1C 5.29 12/20/2019     Lab Results   Component Value Date    TSH 1.760 12/20/2019           ASSESSMENT AND PLAN      Diagnoses and all orders for this visit:    1. Palpitations (Primary)  -     Cancel: ECG 12 Lead    2. Essential hypertension    Other orders  -     metoprolol succinate XL (Toprol XL) 25 MG 24 hr tablet; Take a 1 tablet once daily.  Dispense: 30 tablet; Refill: 3    Patient would like to go back on his Edarbi chlor. I have talked to him about the discount program through [facility] and he is agreeable to do this as a mail order. He is still going to continue on the Toprol daily as 1 tablet as his heart rate is still in the 70s. He is going to come back in 4 weeks for a blood pressure check and then we will go ahead and see him back regular in 6 months. He did not want a EKG today as he says this is all medication related. I have told him if the switch in  the medicine does not make a difference then we can always put another monitor on him which he is agreeable to.    I have given him a bottle of Edarbi chlor 40/25.    Patient's Body mass index is 41.56 kg/m². BMI is above normal parameters. Recommendations include: referral to primary care.                Kate Santiago MD  4/16/2021  09:43 CDT

## 2021-05-17 DIAGNOSIS — I10 ESSENTIAL HYPERTENSION: Primary | ICD-10-CM

## 2021-05-27 ENCOUNTER — TELEPHONE (OUTPATIENT)
Dept: CARDIOLOGY | Facility: CLINIC | Age: 61
End: 2021-05-27

## 2021-05-27 ENCOUNTER — LAB (OUTPATIENT)
Dept: LAB | Facility: OTHER | Age: 61
End: 2021-05-27

## 2021-05-27 DIAGNOSIS — I10 ESSENTIAL HYPERTENSION: ICD-10-CM

## 2021-05-27 DIAGNOSIS — R89.9 ABNORMAL LABORATORY TEST: Primary | ICD-10-CM

## 2021-05-27 LAB
ANION GAP SERPL CALCULATED.3IONS-SCNC: 7 MMOL/L (ref 5–15)
BUN SERPL-MCNC: 20 MG/DL (ref 7–23)
BUN/CREAT SERPL: 13.9 (ref 7–25)
CALCIUM SPEC-SCNC: 9.5 MG/DL (ref 8.4–10.2)
CHLORIDE SERPL-SCNC: 99 MMOL/L (ref 101–112)
CO2 SERPL-SCNC: 31 MMOL/L (ref 22–30)
CREAT SERPL-MCNC: 1.44 MG/DL (ref 0.7–1.3)
GFR SERPL CREATININE-BSD FRML MDRD: 50 ML/MIN/1.73 (ref 49–113)
GLUCOSE SERPL-MCNC: 95 MG/DL (ref 70–99)
POTASSIUM SERPL-SCNC: 4 MMOL/L (ref 3.4–5)
SODIUM SERPL-SCNC: 137 MMOL/L (ref 137–145)

## 2021-05-27 PROCEDURE — 36415 COLL VENOUS BLD VENIPUNCTURE: CPT | Performed by: INTERNAL MEDICINE

## 2021-05-27 PROCEDURE — 80048 BASIC METABOLIC PNL TOTAL CA: CPT | Performed by: INTERNAL MEDICINE

## 2021-05-27 NOTE — TELEPHONE ENCOUNTER
Message left for Mr. Blair to return my call.  I am needing to go over his test results.  They are as follows:  His creatinine is little bit higher which is not unexpected when he first gets the blood pressure under control.  Would like to repeat BMP it in 2 weeks for comparison.  Increase his fluid intake.

## 2021-05-27 NOTE — TELEPHONE ENCOUNTER
Per Jack, patient notified that His creatinine is little bit higher which is not unexpected when he first gets the blood pressure under control.  Would like to repeat BMP it in 2 weeks for comparison.  Increase his fluid intake.

## 2021-06-15 ENCOUNTER — LAB (OUTPATIENT)
Dept: LAB | Facility: OTHER | Age: 61
End: 2021-06-15

## 2021-06-15 DIAGNOSIS — R89.9 ABNORMAL LABORATORY TEST: ICD-10-CM

## 2021-06-15 LAB
ANION GAP SERPL CALCULATED.3IONS-SCNC: 4 MMOL/L (ref 5–15)
BUN SERPL-MCNC: 21 MG/DL (ref 7–23)
BUN/CREAT SERPL: 15.9 (ref 7–25)
CALCIUM SPEC-SCNC: 9.6 MG/DL (ref 8.4–10.2)
CHLORIDE SERPL-SCNC: 103 MMOL/L (ref 101–112)
CO2 SERPL-SCNC: 30 MMOL/L (ref 22–30)
CREAT SERPL-MCNC: 1.32 MG/DL (ref 0.7–1.3)
GFR SERPL CREATININE-BSD FRML MDRD: 55 ML/MIN/1.73 (ref 49–113)
GLUCOSE SERPL-MCNC: 109 MG/DL (ref 70–99)
POTASSIUM SERPL-SCNC: 4.3 MMOL/L (ref 3.4–5)
SODIUM SERPL-SCNC: 137 MMOL/L (ref 137–145)

## 2021-06-15 PROCEDURE — 36415 COLL VENOUS BLD VENIPUNCTURE: CPT | Performed by: INTERNAL MEDICINE

## 2021-06-15 PROCEDURE — 80048 BASIC METABOLIC PNL TOTAL CA: CPT | Performed by: INTERNAL MEDICINE

## 2021-06-25 ENCOUNTER — TELEPHONE (OUTPATIENT)
Dept: PULMONOLOGY | Facility: CLINIC | Age: 61
End: 2021-06-25

## 2021-08-24 ENCOUNTER — DOCUMENTATION (OUTPATIENT)
Dept: CARDIOLOGY | Facility: CLINIC | Age: 61
End: 2021-08-24

## 2021-08-24 DIAGNOSIS — I10 ESSENTIAL HYPERTENSION: Primary | ICD-10-CM

## 2021-08-24 DIAGNOSIS — I10 ESSENTIAL HYPERTENSION: ICD-10-CM

## 2021-08-24 RX ORDER — METOPROLOL SUCCINATE 25 MG/1
TABLET, EXTENDED RELEASE ORAL
Qty: 30 TABLET | Refills: 0 | Status: SHIPPED | OUTPATIENT
Start: 2021-08-24 | End: 2021-08-24 | Stop reason: SDUPTHER

## 2021-08-24 RX ORDER — METOPROLOL SUCCINATE 25 MG/1
TABLET, EXTENDED RELEASE ORAL
Qty: 30 TABLET | Refills: 0 | Status: SHIPPED | OUTPATIENT
Start: 2021-08-24 | End: 2021-10-15 | Stop reason: SDUPTHER

## 2021-08-24 NOTE — PROGRESS NOTES
Refill for metoprolol succinate XL (TOPROL-XL) 25 MG 24 hr tablet has been approved and sent to the pharmacy

## 2021-10-01 RX ORDER — TIZANIDINE 4 MG/1
TABLET ORAL
Qty: 30 TABLET | Refills: 0 | Status: SHIPPED | OUTPATIENT
Start: 2021-10-01 | End: 2021-10-19

## 2021-10-11 RX ORDER — TRAZODONE HYDROCHLORIDE 50 MG/1
TABLET ORAL
Qty: 30 TABLET | Refills: 0 | OUTPATIENT
Start: 2021-10-11

## 2021-10-13 RX ORDER — TRAZODONE HYDROCHLORIDE 50 MG/1
TABLET ORAL
Qty: 30 TABLET | Refills: 0 | OUTPATIENT
Start: 2021-10-13

## 2021-10-15 ENCOUNTER — OFFICE VISIT (OUTPATIENT)
Dept: CARDIOLOGY | Facility: CLINIC | Age: 61
End: 2021-10-15

## 2021-10-15 VITALS
HEART RATE: 60 BPM | OXYGEN SATURATION: 97 % | DIASTOLIC BLOOD PRESSURE: 90 MMHG | SYSTOLIC BLOOD PRESSURE: 138 MMHG | WEIGHT: 291.2 LBS | HEIGHT: 71 IN | BODY MASS INDEX: 40.77 KG/M2

## 2021-10-15 DIAGNOSIS — I10 ESSENTIAL HYPERTENSION: ICD-10-CM

## 2021-10-15 DIAGNOSIS — R00.2 PALPITATIONS: Primary | ICD-10-CM

## 2021-10-15 LAB
QT INTERVAL: 390 MS
QTC INTERVAL: 390 MS

## 2021-10-15 PROCEDURE — 93000 ELECTROCARDIOGRAM COMPLETE: CPT | Performed by: INTERNAL MEDICINE

## 2021-10-15 PROCEDURE — 99214 OFFICE O/P EST MOD 30 MIN: CPT | Performed by: INTERNAL MEDICINE

## 2021-10-15 RX ORDER — CYCLOBENZAPRINE HCL 10 MG
TABLET ORAL
COMMUNITY
Start: 2021-09-02 | End: 2021-10-19

## 2021-10-15 RX ORDER — TRAZODONE HYDROCHLORIDE 50 MG/1
TABLET ORAL
COMMUNITY
Start: 2021-08-19 | End: 2021-10-19 | Stop reason: SDUPTHER

## 2021-10-15 RX ORDER — METOPROLOL SUCCINATE 25 MG/1
TABLET, EXTENDED RELEASE ORAL
Qty: 30 TABLET | Refills: 11 | Status: SHIPPED | OUTPATIENT
Start: 2021-10-15 | End: 2022-04-08

## 2021-10-15 NOTE — PROGRESS NOTES
Cy Blair  61 y.o. male    10/15/2021  Chief Complaint   Patient presents with   • Palpitations     Chief Complaint       History of Present Illness  Patient with palpitations and hypertension    -        SUBJECTIVE  Patient overall is doing well.  He has developed neck pain and is seeing urology for that.  His weight has went up.  His blood pressure pressure has went up as his weight has gained.  He has gained 6 pounds.  He had cut his Edarbyclor in half.  With all that his blood pressure has risen from where he was under good control.  No Known Allergies      Past Medical History:   Diagnosis Date   • Candidiasis of skin     Candidiasis of skin - has flareups during the summer   • Essential (primary) hypertension          Past Surgical History:   Procedure Laterality Date   • KIDNEY STONE SURGERY     • NECK SURGERY      for herniated discs and spinal stenosis         Family History   Problem Relation Age of Onset   • Heart attack Mother 81        POSSIBLE MI   • Heart disease Father    • Sudden death Other    • Hypertension Brother    • Dementia Maternal Grandmother    • No Known Problems Maternal Grandfather    • No Known Problems Paternal Grandmother    • Clotting disorder Paternal Grandfather          Social History     Socioeconomic History   • Marital status:    Tobacco Use   • Smoking status: Never Smoker   • Smokeless tobacco: Never Used   Vaping Use   • Vaping Use: Never used   Substance and Sexual Activity   • Alcohol use: No   • Drug use: No   • Sexual activity: Defer         Prior to Admission medications    Medication Sig Start Date End Date Taking? Authorizing Provider   aspirin 81 MG chewable tablet Chew 81 mg Daily.   Yes Fernando Sladivar MD   Azilsartan-Chlorthalidone (Edarbyclor) 40-25 MG tablet Take 1 tablet by mouth Daily. 4/16/21  Yes Kate Santiago MD   cyclobenzaprine (FLEXERIL) 10 MG tablet  9/2/21  Yes Fernando Saldivar MD   metoprolol succinate XL (TOPROL-XL)  "25 MG 24 hr tablet Take 1 tablet by mouth once daily 8/24/21  Yes Kate Santiago MD   Omega-3 Fatty Acids (FISH OIL BURP-LESS) 1200 MG capsule Take  by mouth.   Yes Fernando Saldivar MD   omeprazole (priLOSEC) 40 MG capsule Take 1 capsule by mouth once daily 4/13/21  Yes Robyn Dennis APRN   Lovltx-QwZbb-FoMmgg-FA-Omega (MULTIVITAMIN/MINERALS PO) Med Name: multivitamin & minerals #11-folic acid oral   Yes Fernando Saldivar MD   senna-docusate (SENOKOT S) 8.6-50 MG per tablet 1-2 tablets Daily.   Yes Fernando Saldivar MD   tiZANidine (ZANAFLEX) 4 MG tablet TAKE 1 TABLET BY MOUTH AT NIGHT AS NEEDED FOR MUSCLE SPASM 10/1/21  Yes Sarah Gilbert APRN   traZODone (DESYREL) 50 MG tablet TAKE 1 2 TO 1 TABLET BY MOUTH 30 MINUTES PRIOR BEDTIME AS NEEDED FOR HELP WITH SLEEP 8/19/21  Yes Fernando Saldivar MD   vitamin C (ASCORBIC ACID) 500 MG tablet Take 1,000 mg by mouth Daily.   Yes Emergency, Nurse [place], RN         OBJECTIVE    /90 (BP Location: Left arm, Patient Position: Sitting, Cuff Size: Adult)   Pulse 60   Ht 180.3 cm (71\")   Wt 132 kg (291 lb 3.2 oz)   SpO2 97%   BMI 40.61 kg/m²         Review of Systems     Constitutional:  Denies recent weight loss, weight gain, fever or chills, no change in exercise tolerance     HENT:  Denies any hearing loss, epistaxis, hoarseness, or difficulty speaking.     Eyes: Wears eyeglasses or contact lenses     Respiratory:  Denies dyspnea with exertion,no cough, wheezing, or hemoptysis.     Cardiovascular: Negative for palpations, chest pain, orthopnea, PND, peripheral edema, syncope, or claudication.     Gastrointestinal:  Denies change in bowel habits, dyspepsia, ulcer disease, hematochezia, or melena.     Endocrine: Negative for cold intolerance, heat intolerance, polydipsia, polyphagia and polyuria. Denies any history of weight change, heat/cold intolerance, polydipsia, polyuria     Genitourinary: Negative.      Musculoskeletal: Denies any history " of arthritic symptoms or back problems     Skin:  Denies any change in hair or nails, rashes, or skin lesions.     Allergic/Immunologic: Negative.  Negative for environmental allergies, food allergies and immunocompromised state.     Neurological:  Denies any history of recurrent headaches, strokes, TIA, or seizure disorder.     Hematological: Denies any food allergies, seasonal allergies, bleeding disorders, or lymphadenopathy.     Psychiatric/Behavioral: Denies any history of depression, substance abuse, or change in cognitive function.         Physical Exam     Constitutional: Cooperative, alert and oriented, well-developed, well-nourished, in no acute distress.     HENT:   Head: Normocephalic, normal hair patterns, no masses or tenderness.  Ears, Nose, and Throat: No gross abnormalities. No pallor or cyanosis. Dentition good.   Eyes: EOMS intact, PERRL, conjunctivae and lids unremarkable. Fundoscopic exam and visual fields not performed.   Neck: No palpable masses or adenopathy, no thyromegaly, no JVD, carotid pulses are full and equal bilaterally and without  Bruits.     Cardiovascular: Regular rhythm, S1 and S2 normal, no S3 or S4. Apical impulse not displaced. No murmurs, gallops, or rubs detected.     Pulmonary/Chest: Chest: normal symmetry, no tenderness to palpation, normal respiratory excursion, no intercostal retraction, no use of accessory muscles.            Pulmonary: Normal breath sounds. No rales or ronchi.    Abdominal: Abdomen soft, bowel sounds normoactive, no masses, no hepatosplenomegaly, non-tender, no bruits.     Musculoskeletal: No deformities, clubbing, cyanosis, erythema, or edema observed. There are no spinal abnormalities noted. Normal muscle strength and tone. Pulses full and equal in all extremities, no bruits auscultated.     Neurological: No gross motor or sensory deficits noted, affect appropriate, oriented to time, person, place.     Skin: Warm and dry to the touch, no apparent  skin lesions or masses noted.     Psychiatric: He has a normal mood and affect. His behavior is normal. Judgment and thought content normal.         Procedures      Lab Results   Component Value Date    WBC 7.16 12/20/2019    HGB 16.0 12/20/2019    HCT 44.2 12/20/2019    MCV 85.2 12/20/2019     12/20/2019     Lab Results   Component Value Date    GLUCOSE 109 (H) 06/15/2021    BUN 21 06/15/2021    CREATININE 1.32 (H) 06/15/2021    EGFRIFNONA 55 06/15/2021    BCR 15.9 06/15/2021    CO2 30.0 06/15/2021    CALCIUM 9.6 06/15/2021    ALBUMIN 4.90 12/20/2019    AST 23 12/20/2019    ALT 19 12/20/2019     Lab Results   Component Value Date    CHOL 150 12/20/2019    CHOL 138 (L) 06/28/2018    CHOL 150 09/13/2017     Lab Results   Component Value Date    TRIG 97 12/20/2019    TRIG 110 06/28/2018    TRIG 145 09/13/2017     Lab Results   Component Value Date    HDL 42 12/20/2019    HDL 39 (L) 06/28/2018    HDL 39 09/13/2017     No components found for: LDLCALC  Lab Results   Component Value Date    LDL 89 12/20/2019    LDL 77 06/28/2018    LDL 82 09/13/2017     No results found for: HDLLDLRATIO  No components found for: CHOLHDL  Lab Results   Component Value Date    HGBA1C 5.29 12/20/2019     Lab Results   Component Value Date    TSH 1.760 12/20/2019           ASSESSMENT AND PLAN      Diagnoses and all orders for this visit:    1. Palpitations (Primary)  -     ECG 12 Lead    2. Essential hypertension  -     metoprolol succinate XL (TOPROL-XL) 25 MG 24 hr tablet; Take 1 tablet by mouth once daily  Dispense: 30 tablet; Refill: 11    Overall he is doing well.  His blood pressure is up as well as his weight.  I have tell him to go ahead and start taking the Edarbi chlor as prescribed, which is 1 full pill a day.  I have refilled his metoprolol.    He is seeing neurology for his neck pain and obviously if this gets under control I think his blood pressure will improve.  His last issue is his weight gain.-Recommended that he  would talk to you about starting with  Wegovy .      Patient's Body mass index is 40.61 kg/m². indicating that he is morbidly obese (BMI > 40 or > 35 with obesity - related health condition). Obesity-related health conditions include the following: hypertension. Obesity is worsening. BMI is is above average; BMI management plan is completed. We discussed portion control, increasing exercise and pharmacologic options including Wegovy...                Kate Santiago MD  10/15/2021  09:28 CDT

## 2021-10-19 ENCOUNTER — OFFICE VISIT (OUTPATIENT)
Dept: FAMILY MEDICINE CLINIC | Facility: CLINIC | Age: 61
End: 2021-10-19

## 2021-10-19 VITALS
SYSTOLIC BLOOD PRESSURE: 126 MMHG | BODY MASS INDEX: 39.48 KG/M2 | HEART RATE: 73 BPM | DIASTOLIC BLOOD PRESSURE: 86 MMHG | OXYGEN SATURATION: 99 % | WEIGHT: 282 LBS | TEMPERATURE: 98.3 F | HEIGHT: 71 IN

## 2021-10-19 DIAGNOSIS — M25.78 SPINAL OSTEOPHYTOSIS: ICD-10-CM

## 2021-10-19 DIAGNOSIS — Z98.890 H/O CERVICAL DISCECTOMY: ICD-10-CM

## 2021-10-19 DIAGNOSIS — E66.01 CLASS 2 SEVERE OBESITY WITH SERIOUS COMORBIDITY AND BODY MASS INDEX (BMI) OF 39.0 TO 39.9 IN ADULT, UNSPECIFIED OBESITY TYPE (HCC): ICD-10-CM

## 2021-10-19 DIAGNOSIS — G95.89 MYELOMALACIA OF CERVICAL CORD (HCC): ICD-10-CM

## 2021-10-19 DIAGNOSIS — G47.9 SLEEP DISORDER: Primary | ICD-10-CM

## 2021-10-19 PROCEDURE — 99213 OFFICE O/P EST LOW 20 MIN: CPT | Performed by: NURSE PRACTITIONER

## 2021-10-19 RX ORDER — TRAZODONE HYDROCHLORIDE 50 MG/1
TABLET ORAL
Qty: 30 TABLET | Refills: 2 | Status: SHIPPED | OUTPATIENT
Start: 2021-10-19

## 2021-10-19 RX ORDER — TIZANIDINE 4 MG/1
4 TABLET ORAL NIGHTLY PRN
Qty: 30 TABLET | Refills: 0 | Status: CANCELLED | OUTPATIENT
Start: 2021-10-19

## 2021-10-19 RX ORDER — SEMAGLUTIDE 0.25 MG/.5ML
0.25 INJECTION, SOLUTION SUBCUTANEOUS WEEKLY
Qty: 1 ML | Refills: 1 | Status: SHIPPED | OUTPATIENT
Start: 2021-10-19 | End: 2021-11-29 | Stop reason: SDUPTHER

## 2021-10-19 RX ORDER — TIZANIDINE 4 MG/1
TABLET ORAL
Qty: 60 TABLET | Refills: 1 | Status: SHIPPED | OUTPATIENT
Start: 2021-10-19 | End: 2022-04-18

## 2021-11-08 NOTE — PROGRESS NOTES
Subjective   Cy Blair is a 61 y.o. male who presents to the office for weight gain follow-up.  Seen by cardiology Jack who recommended he followup here as his BP is running high again.  Dr. Santiago raised his BP med.  Mr. Blair says he is unable to exercise due to neck pain.  Has lost nine pounds over the past few visits.  Usually only eats two meals a day.    Has chronic headaches because of previous back surgery which resulted in cadaver bones being placed.  Neurosurgery has referred him to [facility].  Flexeril causes him to be sleepy.  Regarding his sleep issues, Trazodone is helping and he would like a refill of that.  Also tells me that his ear feels stopped up and wants me to take a look.    History of Present Illness     The following portions of the patient's history were reviewed and updated as appropriate: allergies, current medications, past family history, past medical history, past social history, past surgical history and problem list.    Review of Systems   Constitutional: Positive for unexpected weight change. Negative for chills, fatigue and fever.   HENT: Negative for congestion, sneezing, sore throat and trouble swallowing.    Eyes: Negative for visual disturbance.   Respiratory: Negative for cough, chest tightness, shortness of breath and wheezing.    Cardiovascular: Negative for chest pain, palpitations and leg swelling.   Gastrointestinal: Negative for abdominal pain, constipation, diarrhea, nausea and vomiting.   Genitourinary: Negative for dysuria, frequency and urgency.   Musculoskeletal: Negative for neck pain.   Skin: Negative for rash.   Neurological: Positive for headaches. Negative for dizziness and weakness.   Psychiatric/Behavioral: Positive for sleep disturbance.        In the past two weeks the pt has not felt down, depressed, hopeless or lost interest in doing things   All other systems reviewed and are negative.      Objective   Physical Exam  Vitals and nursing note  reviewed.   Constitutional:       Appearance: He is well-developed.   HENT:      Head: Normocephalic and atraumatic.      Right Ear: External ear normal. There is impacted cerumen.      Left Ear: External ear normal. There is impacted cerumen.      Ears:      Comments: Both ears impacted with cerumen, patient tolerated irrigation with half-strength hydrogen peroxide with no complications; encouraged no Q tips, ay use OTC ear wax softeners     Nose: Nose normal.   Eyes:      General: No scleral icterus.        Right eye: No discharge.         Left eye: No discharge.      Conjunctiva/sclera: Conjunctivae normal.      Pupils: Pupils are equal, round, and reactive to light.   Neck:      Thyroid: No thyromegaly.   Cardiovascular:      Rate and Rhythm: Normal rate and regular rhythm.      Heart sounds: Normal heart sounds. No murmur heard.  No friction rub. No gallop.    Pulmonary:      Effort: Pulmonary effort is normal. No respiratory distress.      Breath sounds: Normal breath sounds. No wheezing or rales.   Abdominal:      General: Bowel sounds are normal. There is no distension.      Palpations: Abdomen is soft.      Tenderness: There is no abdominal tenderness. There is no guarding or rebound.   Musculoskeletal:      Cervical back: Neck supple. Tenderness present. Pain with movement present. Decreased range of motion.   Lymphadenopathy:      Cervical: No cervical adenopathy.   Skin:     General: Skin is warm and dry.      Findings: No rash.   Neurological:      Mental Status: He is alert and oriented to person, place, and time.      Cranial Nerves: No cranial nerve deficit.   Psychiatric:         Behavior: Behavior normal.         Thought Content: Thought content normal.         Judgment: Judgment normal.         Assessment/Plan   Diagnoses and all orders for this visit:    1. Sleep disorder (Primary)    2. Spinal osteophytosis    3. Myelomalacia of cervical cord (HCC)    4. H/O cervical discectomy    5. Class 2  severe obesity with serious comorbidity and body mass index (BMI) of 39.0 to 39.9 in adult, unspecified obesity type (HCC)    Other orders  -     traZODone (DESYREL) 50 MG tablet; Take 1/2 to 1 tablet by mouth 30 minutes prior to bedtime as needed for help with sleep  Dispense: 30 tablet; Refill: 2  -     tiZANidine (ZANAFLEX) 4 MG tablet; Take one tablet by mouth twice daily as needed for muscle spasms.  Dispense: 60 tablet; Refill: 1  -     Semaglutide-Weight Management (semaglutide) 0.25 MG/0.5ML solution auto-injector; Inject 0.25 mg under the skin into the appropriate area as directed 1 (One) Time Per Week.  Dispense: 1 mL; Refill: 1    Encouraged to continue with meds as ordered.  Will start on Wegovy with hopeful reduction of his weight.  Insomnia precautions.             This document has been electronically signed by MARTINA Milan on November 7, 2021 23:16 CST

## 2021-11-18 ENCOUNTER — TELEPHONE (OUTPATIENT)
Dept: CARDIOLOGY | Facility: CLINIC | Age: 61
End: 2021-11-18

## 2021-11-18 NOTE — TELEPHONE ENCOUNTER
Contacted PT per Dr. Santiago about medications.  PT was unavailable and left VM.      ----- Message from Kate Santiago MD sent at 2021  9:19 AM CST -----  Regarding: RE: call back  Increase more fluid.  If that does not help he needs to see Hope Vladimir to see if anything else is going on  ----- Message -----  From: Marcie Silva MA  Sent: 2021   9:09 AM CST  To: Kate Santiago MD  Subject: FW: call back                                    Armando, what would you advise?  ----- Message -----  From: Mónica Palomino  Sent: 11/10/2021   4:38 PM CST  To: Marcie Silva MA  Subject: call back                                        Cy Blair  56 stated his Bp is running between 120/80 then drops to 80/59, he stated he is on metoprolol succinate xl 25 mg and edarbyclor 40-25 mg. He wanted to get a sooner appt to see Dr. Santiago, but I told him you will give him a call back @ [phone] to talk about this. Thxs

## 2021-11-24 ENCOUNTER — TELEPHONE (OUTPATIENT)
Dept: CARDIOLOGY | Facility: CLINIC | Age: 61
End: 2021-11-24

## 2021-11-24 DIAGNOSIS — R00.2 PALPITATIONS: Primary | ICD-10-CM

## 2021-11-24 NOTE — TELEPHONE ENCOUNTER
PT called back. Per Dr. Santiago, about bp questions and hr.  Talked with Dr. Santiago, pt is going to get monitor put on Tuesday in [place].  Pt voiced understanding.      ----- Message from Kate Santiago MD sent at 2021  9:19 AM CST -----  Regarding: RE: call back  Increase more fluid.  If that does not help he needs to see Hope Vladimir to see if anything else is going on  ----- Message -----  From: Marcie Silva MA  Sent: 2021   9:09 AM CST  To: Kate Santiago MD  Subject: FW: call back                                     Armando, what would you advise?  ----- Message -----  From: Mónica Palomino  Sent: 11/10/2021   4:38 PM CST  To: Marcie Silva MA  Subject: call back                                         Cy Blair  56 stated his Bp is running between 120/80 then drops to 80/59, he stated he is on metoprolol succinate xl 25 mg and edarbyclor 40-25 mg. He wanted to get a sooner appt to see Dr. Santiago, but I told him you will give him a call back @ [phone] to talk about this. Thxs

## 2021-11-29 ENCOUNTER — OFFICE VISIT (OUTPATIENT)
Dept: FAMILY MEDICINE CLINIC | Facility: CLINIC | Age: 61
End: 2021-11-29

## 2021-11-29 VITALS
OXYGEN SATURATION: 98 % | HEIGHT: 71 IN | HEART RATE: 72 BPM | BODY MASS INDEX: 40.32 KG/M2 | DIASTOLIC BLOOD PRESSURE: 80 MMHG | WEIGHT: 288 LBS | TEMPERATURE: 98.1 F | SYSTOLIC BLOOD PRESSURE: 130 MMHG

## 2021-11-29 DIAGNOSIS — K08.89 PAIN, DENTAL: Primary | ICD-10-CM

## 2021-11-29 DIAGNOSIS — E66.01 CLASS 3 SEVERE OBESITY WITH SERIOUS COMORBIDITY AND BODY MASS INDEX (BMI) OF 40.0 TO 44.9 IN ADULT, UNSPECIFIED OBESITY TYPE (HCC): ICD-10-CM

## 2021-11-29 PROCEDURE — 99213 OFFICE O/P EST LOW 20 MIN: CPT | Performed by: NURSE PRACTITIONER

## 2021-11-29 RX ORDER — AMOXICILLIN 500 MG/1
500 TABLET, FILM COATED ORAL 3 TIMES DAILY
Qty: 30 TABLET | Refills: 0 | Status: SHIPPED | OUTPATIENT
Start: 2021-11-29 | End: 2021-12-09

## 2021-11-29 RX ORDER — SEMAGLUTIDE 0.25 MG/.5ML
0.25 INJECTION, SOLUTION SUBCUTANEOUS WEEKLY
Qty: 1 ML | Refills: 1 | Status: SHIPPED | OUTPATIENT
Start: 2021-11-29

## 2021-11-29 NOTE — PROGRESS NOTES
Subjective   Cy Blair is a 61 y.o. male who presents to the office complaining of dental pain in his LEFT upper jaw that started on Friday.  Pain even woke him up.  Pain particularly significant when he brings upper and lower jaw together.  DMD Fallon will not be able to see him until next week as he is a new patient.  Denies swelling.  Denies fever.    Also tells me that his Wegovy has been approved by his insurance.  History of Present Illness     The following portions of the patient's history were reviewed and updated as appropriate: allergies, current medications, past family history, past medical history, past social history, past surgical history and problem list.    Review of Systems   Constitutional: Negative for chills, fatigue and fever.   HENT: Positive for dental problem. Negative for congestion, sneezing, sore throat and trouble swallowing.    Eyes: Negative for visual disturbance.   Respiratory: Negative for cough, chest tightness, shortness of breath and wheezing.    Cardiovascular: Negative for chest pain, palpitations and leg swelling.   Gastrointestinal: Negative for abdominal pain, constipation, diarrhea, nausea and vomiting.   Genitourinary: Negative for dysuria, frequency and urgency.   Musculoskeletal: Negative for neck pain.   Skin: Negative for rash.   Neurological: Negative for dizziness, weakness and headaches.   Psychiatric/Behavioral:        In the past two weeks the pt has not felt down, depressed, hopeless or lost interest in doing things   All other systems reviewed and are negative.      Objective   Physical Exam  Vitals and nursing note reviewed.   Constitutional:       General: He is not in acute distress.     Appearance: He is well-developed. He is not ill-appearing.   HENT:      Head: Normocephalic and atraumatic.      Right Ear: External ear normal.      Left Ear: External ear normal.      Nose: Nose normal.      Mouth/Throat:      Lips: Pink.      Mouth: Mucous  membranes are moist.      Dentition: Dental tenderness present. No gingival swelling. Dental abscesses: no obvious.      Pharynx: Oropharynx is clear. Uvula midline.   Eyes:      General: No scleral icterus.        Right eye: No discharge.         Left eye: No discharge.      Conjunctiva/sclera: Conjunctivae normal.      Pupils: Pupils are equal, round, and reactive to light.   Neck:      Thyroid: No thyromegaly.   Cardiovascular:      Rate and Rhythm: Normal rate and regular rhythm.      Heart sounds: Normal heart sounds. No murmur heard.  No friction rub. No gallop.    Pulmonary:      Effort: Pulmonary effort is normal. No respiratory distress.      Breath sounds: Normal breath sounds. No wheezing or rales.   Abdominal:      General: Bowel sounds are normal. There is no distension.      Palpations: Abdomen is soft.      Tenderness: There is no abdominal tenderness. There is no guarding or rebound.   Musculoskeletal:         General: Normal range of motion.      Cervical back: Normal range of motion and neck supple.   Lymphadenopathy:      Cervical: No cervical adenopathy.   Skin:     General: Skin is warm and dry.      Capillary Refill: Capillary refill takes less than 2 seconds.      Findings: No rash.   Neurological:      General: No focal deficit present.      Mental Status: He is alert and oriented to person, place, and time.      GCS: GCS eye subscore is 4. GCS verbal subscore is 5. GCS motor subscore is 6.      Cranial Nerves: Cranial nerves are intact. No cranial nerve deficit.   Psychiatric:         Behavior: Behavior normal. Behavior is cooperative.         Thought Content: Thought content normal.         Judgment: Judgment normal.         Assessment/Plan   Diagnoses and all orders for this visit:    1. Pain, dental (Primary)    2. Class 3 severe obesity with serious comorbidity and body mass index (BMI) of 40.0 to 44.9 in adult, unspecified obesity type (HCC)    Other orders  -     amoxicillin (AMOXIL)  500 MG tablet; Take 1 tablet by mouth 3 (Three) Times a Day for 10 days.  Dispense: 30 tablet; Refill: 0  -     Semaglutide-Weight Management (semaglutide) 0.25 MG/0.5ML solution auto-injector; Inject 0.25 mg under the skin into the appropriate area as directed 1 (One) Time Per Week.  Dispense: 1 mL; Refill: 1    Encouraged good mouth care.  May use OTC mouth washes.  Will start on Amoxicillin TID.  Will send in Wegovy, have given him Good Rx cards.               This document has been electronically signed by MARTINA Milan on November 29, 2021 17:07 CST

## 2021-11-30 DIAGNOSIS — R00.2 PALPITATIONS: Primary | ICD-10-CM

## 2021-12-07 ENCOUNTER — TELEPHONE (OUTPATIENT)
Dept: CARDIOLOGY | Facility: CLINIC | Age: 61
End: 2021-12-07

## 2021-12-07 DIAGNOSIS — R00.2 PALPITATIONS: Primary | ICD-10-CM

## 2021-12-07 PROCEDURE — 93000 ELECTROCARDIOGRAM COMPLETE: CPT | Performed by: INTERNAL MEDICINE

## 2021-12-07 NOTE — TELEPHONE ENCOUNTER
"Contacted patient because he had questions about his care, particularly an episode that he had last night with blood pressure \"sherrie rocketing\", his words he informed me that his bp was 180/100, and after calming patient down, he informed me that he has been having pain because of a bad tooth and that he was having a root canal on Tuesday and has been terrified and nervous about it. Patient has on a monitor and was instructed by Dr. Santiago to go ahead and mail it in because patient said that he was in afib, patient doesn't have history, but dad has afib, so he been in a constant daily worry about developing afib as well as a stroke. Patient became tearful explaining all of the medical reasons he has to be scared about. I calmed the patient down and asked him if he could quickly get to [facility] and get an ekg done and he said yes, so patient is headed there now   "

## 2021-12-10 LAB
QT INTERVAL: 404 MS
QTC INTERVAL: 406 MS

## 2021-12-15 LAB
MAXIMAL PREDICTED HEART RATE: 157 BPM
STRESS TARGET HR: 133 BPM

## 2021-12-17 ENCOUNTER — TELEPHONE (OUTPATIENT)
Dept: CARDIOLOGY | Facility: CLINIC | Age: 61
End: 2021-12-17

## 2021-12-17 NOTE — TELEPHONE ENCOUNTER
"Contacted Pt per Dr. Santiago about results. Results are as follows; Most symptoms without any abnormalities so extra heart beats nothing serious. Can send to EP if he likes.  PT voiced understanding.              ----- Message from Rodriguez Pappas sent at 12/17/2021  2:12 PM CST -----  Dr. Santiago     He said he had a message to return  your call, he thinks it is about his test results.     Please call him at [phone]    Thanks    Rodriguez    Per dr. Santiago   \"Most symptoms without any abnormalities so extra heart beats nothing serious. Can send to EP if he likes.\"  "

## 2022-04-08 ENCOUNTER — OFFICE VISIT (OUTPATIENT)
Dept: CARDIOLOGY | Facility: CLINIC | Age: 62
End: 2022-04-08

## 2022-04-08 ENCOUNTER — TELEPHONE (OUTPATIENT)
Dept: CARDIOLOGY | Facility: CLINIC | Age: 62
End: 2022-04-08

## 2022-04-08 ENCOUNTER — LAB (OUTPATIENT)
Dept: LAB | Facility: OTHER | Age: 62
End: 2022-04-08

## 2022-04-08 VITALS
HEART RATE: 84 BPM | TEMPERATURE: 98.6 F | HEIGHT: 71 IN | OXYGEN SATURATION: 96 % | DIASTOLIC BLOOD PRESSURE: 84 MMHG | BODY MASS INDEX: 40.63 KG/M2 | WEIGHT: 290.2 LBS | SYSTOLIC BLOOD PRESSURE: 178 MMHG

## 2022-04-08 DIAGNOSIS — R00.2 PALPITATIONS: ICD-10-CM

## 2022-04-08 DIAGNOSIS — I10 ESSENTIAL HYPERTENSION: ICD-10-CM

## 2022-04-08 DIAGNOSIS — I10 ESSENTIAL HYPERTENSION: Primary | ICD-10-CM

## 2022-04-08 LAB
ANION GAP SERPL CALCULATED.3IONS-SCNC: 8 MMOL/L (ref 5–15)
BUN SERPL-MCNC: 19 MG/DL (ref 7–23)
BUN/CREAT SERPL: 15.4 (ref 7–25)
CALCIUM SPEC-SCNC: 9.6 MG/DL (ref 8.4–10.2)
CHLORIDE SERPL-SCNC: 101 MMOL/L (ref 101–112)
CO2 SERPL-SCNC: 27 MMOL/L (ref 22–30)
CREAT SERPL-MCNC: 1.23 MG/DL (ref 0.7–1.3)
EGFRCR SERPLBLD CKD-EPI 2021: 66 ML/MIN/1.73
GLUCOSE SERPL-MCNC: 129 MG/DL (ref 70–99)
POTASSIUM SERPL-SCNC: 3.7 MMOL/L (ref 3.4–5)
SODIUM SERPL-SCNC: 136 MMOL/L (ref 137–145)
T-UPTAKE NFR SERPL: 1.01 TBI (ref 0.8–1.3)
T4 SERPL-MCNC: 6.94 MCG/DL (ref 4.5–11.7)
TSH SERPL DL<=0.05 MIU/L-ACNC: 2.59 UIU/ML (ref 0.27–4.2)

## 2022-04-08 PROCEDURE — 84479 ASSAY OF THYROID (T3 OR T4): CPT | Performed by: INTERNAL MEDICINE

## 2022-04-08 PROCEDURE — 84436 ASSAY OF TOTAL THYROXINE: CPT | Performed by: INTERNAL MEDICINE

## 2022-04-08 PROCEDURE — 99214 OFFICE O/P EST MOD 30 MIN: CPT | Performed by: INTERNAL MEDICINE

## 2022-04-08 PROCEDURE — 36415 COLL VENOUS BLD VENIPUNCTURE: CPT | Performed by: INTERNAL MEDICINE

## 2022-04-08 PROCEDURE — 80048 BASIC METABOLIC PNL TOTAL CA: CPT | Performed by: INTERNAL MEDICINE

## 2022-04-08 PROCEDURE — 84443 ASSAY THYROID STIM HORMONE: CPT | Performed by: INTERNAL MEDICINE

## 2022-04-08 RX ORDER — METOPROLOL SUCCINATE 50 MG/1
TABLET, EXTENDED RELEASE ORAL
Qty: 30 TABLET | Refills: 11 | Status: SHIPPED | OUTPATIENT
Start: 2022-04-08 | End: 2023-03-20 | Stop reason: SDUPTHER

## 2022-04-08 NOTE — PROGRESS NOTES
Cy Blair  61 y.o. male    04/08/2022  Chief Complaint   Patient presents with   • Palpitations       History of Present Illness  Patient with palpitations and episodic hypertension    -        SUBJECTIVE  Patient states that his palpitations have started to come back.  He stated that he is gaining weight and also yeast still working swing shift.  He had been taking more 5-hour energy to stay away but then at other times he says if he does not do it he will be very sleepy.  He said he did not really notice the palpitations so after he stopped the 5-hour energy.  He is intermittent on the way he takes his blood pressure medicine.  He says at times he will take it without interruption and at other times he will go a week or 1 or more taking it just due to the blood pressure readings at home being normal.  He denies chest pain.    He has had difficulties with his job being able to wear monitors.  He has no true shortness of air.  No Known Allergies      Past Medical History:   Diagnosis Date   • Candidiasis of skin     Candidiasis of skin - has flareups during the summer   • Essential (primary) hypertension          Past Surgical History:   Procedure Laterality Date   • KIDNEY STONE SURGERY     • NECK SURGERY      for herniated discs and spinal stenosis         Family History   Problem Relation Age of Onset   • Heart attack Mother 81        POSSIBLE MI   • Heart disease Father    • Sudden death Other    • Hypertension Brother    • Dementia Maternal Grandmother    • No Known Problems Maternal Grandfather    • No Known Problems Paternal Grandmother    • Clotting disorder Paternal Grandfather          Social History     Socioeconomic History   • Marital status:    Tobacco Use   • Smoking status: Never Smoker   • Smokeless tobacco: Never Used   Vaping Use   • Vaping Use: Never used   Substance and Sexual Activity   • Alcohol use: No   • Drug use: No   • Sexual activity: Defer         Prior to Admission  "medications    Medication Sig Start Date End Date Taking? Authorizing Provider   aspirin 81 MG chewable tablet Chew 81 mg Daily.   Yes Fernando Saldivar MD   Azilsartan-Chlorthalidone (Edarbyclor) 40-25 MG tablet Take 1 tablet by mouth Daily. 4/16/21  Yes Kate Santiago MD   metoprolol succinate XL (TOPROL-XL) 25 MG 24 hr tablet Take 1 tablet by mouth once daily 10/15/21  Yes Kate Santiago MD   Omega-3 Fatty Acids (FISH OIL BURP-LESS) 1200 MG capsule Take  by mouth.   Yes ProviderFernando MD   omeprazole (priLOSEC) 40 MG capsule Take 1 capsule by mouth once daily 4/13/21  Yes Robyn Dennis APRN Prenat-FeCbn-FeBisg-FA-Omega (MULTIVITAMIN/MINERALS PO) Med Name: multivitamin & minerals #11-folic acid oral   Yes Fernando Saldivar MD   Semaglutide-Weight Management (semaglutide) 0.25 MG/0.5ML solution auto-injector Inject 0.25 mg under the skin into the appropriate area as directed 1 (One) Time Per Week. 11/29/21  Yes Sarah Gilbert APRN   sennosides-docusate (PERICOLACE) 8.6-50 MG per tablet 1-2 tablets Daily.   Yes Fernando Saldivar MD   tiZANidine (ZANAFLEX) 4 MG tablet Take one tablet by mouth twice daily as needed for muscle spasms. 10/19/21  Yes Sarah Gilbert APRN   traZODone (DESYREL) 50 MG tablet Take 1/2 to 1 tablet by mouth 30 minutes prior to bedtime as needed for help with sleep 10/19/21  Yes Sarah Gilbert APRN   vitamin C (ASCORBIC ACID) 500 MG tablet Take 1,000 mg by mouth Daily.   Yes Emergency, Nurse Chioma, RN         OBJECTIVE    /84 (BP Location: Left arm, Patient Position: Sitting, Cuff Size: Adult)   Pulse 84   Temp 98.6 °F (37 °C)   Ht 180.3 cm (71\")   Wt 132 kg (290 lb 3.2 oz)   SpO2 96%   BMI 40.47 kg/m²         Review of Systems     Constitutional:  Denies recent weight loss, weight gain, fever or chills, no change in exercise tolerance     HENT:  Denies any hearing loss, epistaxis, hoarseness, or difficulty speaking.     Eyes: Wears eyeglasses or " contact lenses     Respiratory:  Denies dyspnea with exertion,no cough, wheezing, or hemoptysis.     Cardiovascular: Negative for palpations, chest pain, orthopnea, PND, peripheral edema, syncope, or claudication.     Gastrointestinal:  Denies change in bowel habits, dyspepsia, ulcer disease, hematochezia, or melena.     Endocrine: Negative for cold intolerance, heat intolerance, polydipsia, polyphagia and polyuria. Denies any history of weight change, heat/cold intolerance, polydipsia, polyuria     Genitourinary: Negative.      Musculoskeletal: Denies any history of arthritic symptoms or back problems     Skin:  Denies any change in hair or nails, rashes, or skin lesions.     Allergic/Immunologic: Negative.  Negative for environmental allergies, food allergies and immunocompromised state.     Neurological:  Denies any history of recurrent headaches, strokes, TIA, or seizure disorder.     Hematological: Denies any food allergies, seasonal allergies, bleeding disorders, or lymphadenopathy.     Psychiatric/Behavioral: Denies any history of depression, substance abuse, or change in cognitive function. he does appear to have some sleep disturbance possibly secondary to his job and swing shifts.        Physical Exam     Constitutional: Cooperative, alert and oriented, well-developed, well-nourished, in no acute distress.     HENT:   Head: Normocephalic, normal hair patterns, no masses or tenderness.  Ears, Nose, and Throat: No gross abnormalities. No pallor or cyanosis. Dentition good.   Eyes: EOMS intact, PERRL, conjunctivae and lids unremarkable. Fundoscopic exam and visual fields not performed.   Neck: No palpable masses or adenopathy, no thyromegaly, no JVD, carotid pulses are full and equal bilaterally and without  Bruits.     Cardiovascular: Regular rhythm, S1 and S2 normal, no S3 or S4. Apical impulse not displaced. No murmurs, gallops, or rubs detected.     Pulmonary/Chest: Chest: normal symmetry, no tenderness  to palpation, normal respiratory excursion, no intercostal retraction, no use of accessory muscles.            Pulmonary: Normal breath sounds. No rales or ronchi.    Abdominal: Abdomen soft, bowel sounds normoactive, no masses, no hepatosplenomegaly, non-tender, no bruits.     Musculoskeletal: No deformities, clubbing, cyanosis, erythema, or edema observed. There are no spinal abnormalities noted. Normal muscle strength and tone. Pulses full and equal in all extremities, no bruits auscultated.     Neurological: No gross motor or sensory deficits noted, affect appropriate, oriented to time, person, place.     Skin: Warm and dry to the touch, no apparent skin lesions or masses noted.     Psychiatric: He has a normal mood and affect. His behavior is normal. Judgment and thought content normal.         Procedures      Lab Results   Component Value Date    WBC 7.16 12/20/2019    HGB 16.0 12/20/2019    HCT 44.2 12/20/2019    MCV 85.2 12/20/2019     12/20/2019     Lab Results   Component Value Date    GLUCOSE 109 (H) 06/15/2021    BUN 21 06/15/2021    CREATININE 1.32 (H) 06/15/2021    EGFRIFNONA 55 06/15/2021    BCR 15.9 06/15/2021    CO2 30.0 06/15/2021    CALCIUM 9.6 06/15/2021    ALBUMIN 4.90 12/20/2019    AST 23 12/20/2019    ALT 19 12/20/2019     Lab Results   Component Value Date    CHOL 150 12/20/2019    CHOL 138 (L) 06/28/2018    CHOL 150 09/13/2017     Lab Results   Component Value Date    TRIG 97 12/20/2019    TRIG 110 06/28/2018    TRIG 145 09/13/2017     Lab Results   Component Value Date    HDL 42 12/20/2019    HDL 39 (L) 06/28/2018    HDL 39 09/13/2017     No components found for: LDLCALC  Lab Results   Component Value Date    LDL 89 12/20/2019    LDL 77 06/28/2018    LDL 82 09/13/2017     No results found for: HDLLDLRATIO  No components found for: CHOLHDL  Lab Results   Component Value Date    HGBA1C 5.29 12/20/2019     Lab Results   Component Value Date    TSH 1.760 12/20/2019           ASSESSMENT  AND PLAN      Diagnoses and all orders for this visit:    1. Essential hypertension (Primary)  -     CT Angio Abdominal Aorta Bilateral Iliofem Runoff; Future  -     Basic Metabolic Panel; Future  -     Thyroid Panel With TSH; Future  -     Stress Test With Myocardial Perfusion One Day; Future  -     metoprolol succinate XL (TOPROL-XL) 50 MG 24 hr tablet; Take 1 tablet by mouth once daily  Dispense: 30 tablet; Refill: 11    2. Palpitations    He has had a recurrence of the palpitations.  I think this could be secondary to him using energy drinks such as 5-hour energy.  I think we are to do a complete work-up as he is always had episodic blood pressure rises I think this has to do with medication compliance but I think to be for sure a CT to rule out renal artery stenosis a stress test to make sure there is no significant coronary disease though he has no symptoms of chest pain.  We will repeat his metabolic tests and will increase his Toprol to 50 mg a day.    He is agreeable to this.    Patient's Body mass index is 40.47 kg/m². indicating that he is morbidly obese (BMI > 40 or > 35 with obesity - related health condition). Obesity-related health conditions include the following: hypertension. Obesity is worsening. BMI is Knee would be a good candidate for either pharmacological or surgical therapy.  I have advised him to talk to Shawna Gilbert concerning this.. We discussed increasing exercise and He would be a good candidate for pharmacological or possibly bariatric surgery.  I have encouraged him to talk to Shawna Gilbert concerning this...      Also feel that if all this work-up is negative a work-up for insomnia and sleep apnea would be appropriate under the direction of his primary care physician.          Kate Santiago MD  4/8/2022  09:44 CDT

## 2022-04-08 NOTE — TELEPHONE ENCOUNTER
Contacted PT per Dr. Santiago about results. Results are as follows; Labs are good.  PT voiced understanding.        ----- Message from Kate Santiago MD sent at 4/8/2022 11:20 AM CDT -----  Regarding: Labs  Labs are good.  ----- Message -----  From: Lab, Background User  Sent: 4/8/2022  10:15 AM CDT  To: Kate Santiago MD

## 2022-04-18 RX ORDER — TIZANIDINE 4 MG/1
TABLET ORAL
Qty: 60 TABLET | Refills: 0 | Status: SHIPPED | OUTPATIENT
Start: 2022-04-18 | End: 2022-09-09

## 2022-05-04 RX ORDER — AZILSARTAN KAMEDOXOMIL AND CHLORTHALIDONE 40; 25 MG/1; MG/1
TABLET ORAL
Qty: 90 TABLET | Refills: 3 | Status: SHIPPED | OUTPATIENT
Start: 2022-05-04 | End: 2023-03-20

## 2022-05-09 ENCOUNTER — APPOINTMENT (OUTPATIENT)
Dept: CT IMAGING | Facility: HOSPITAL | Age: 62
End: 2022-05-09

## 2022-09-09 RX ORDER — TIZANIDINE 4 MG/1
TABLET ORAL
Qty: 30 TABLET | Refills: 0 | Status: SHIPPED | OUTPATIENT
Start: 2022-09-09

## 2023-03-20 ENCOUNTER — OFFICE VISIT (OUTPATIENT)
Dept: CARDIOLOGY | Facility: CLINIC | Age: 63
End: 2023-03-20
Payer: COMMERCIAL

## 2023-03-20 ENCOUNTER — TELEPHONE (OUTPATIENT)
Dept: CARDIOLOGY | Facility: CLINIC | Age: 63
End: 2023-03-20

## 2023-03-20 VITALS
OXYGEN SATURATION: 98 % | BODY MASS INDEX: 42.36 KG/M2 | HEART RATE: 71 BPM | WEIGHT: 302.6 LBS | DIASTOLIC BLOOD PRESSURE: 80 MMHG | HEIGHT: 71 IN | SYSTOLIC BLOOD PRESSURE: 148 MMHG

## 2023-03-20 DIAGNOSIS — R06.09 DOE (DYSPNEA ON EXERTION): ICD-10-CM

## 2023-03-20 DIAGNOSIS — I10 ESSENTIAL HYPERTENSION: ICD-10-CM

## 2023-03-20 DIAGNOSIS — R00.2 PALPITATIONS: Primary | ICD-10-CM

## 2023-03-20 DIAGNOSIS — R60.0 EDEMA LEG: ICD-10-CM

## 2023-03-20 LAB
QT INTERVAL: 374 MS
QTC INTERVAL: 406 MS

## 2023-03-20 RX ORDER — HYDROCHLOROTHIAZIDE 25 MG/1
25 TABLET ORAL DAILY
Qty: 90 TABLET | Refills: 3 | Status: SHIPPED | OUTPATIENT
Start: 2023-03-20

## 2023-03-20 RX ORDER — VALSARTAN 160 MG/1
160 TABLET ORAL DAILY
Qty: 90 TABLET | Refills: 3 | Status: SHIPPED | OUTPATIENT
Start: 2023-03-20 | End: 2023-03-30 | Stop reason: SINTOL

## 2023-03-20 RX ORDER — MULTIPLE VITAMINS W/ MINERALS TAB 9MG-400MCG
1 TAB ORAL DAILY
COMMUNITY

## 2023-03-20 RX ORDER — METOPROLOL SUCCINATE 50 MG/1
TABLET, EXTENDED RELEASE ORAL
Qty: 30 TABLET | Refills: 6 | Status: SHIPPED | OUTPATIENT
Start: 2023-03-20

## 2023-03-20 RX ORDER — DIPHENHYDRAMINE HCL 25 MG
25 CAPSULE ORAL AS NEEDED
COMMUNITY

## 2023-03-20 NOTE — PROGRESS NOTES
[facility]  OFFICE NOTE    Cardiovascular Medicine          No referring provider defined for this encounter.    Thank you for asking me to see Cy Blair for SALGUERO    History of Present Illness  This is a 62 y.o. male with SALGUERO noted with intermittent activity and rare palp that does wake him up but better wince on lopressor report no Cp no syncope or near syncope   Rare AM winded, less often than prior   Noted increase in weight , no exercise   Compliant with meds  Wants off the edarbiclor as it is too strong and he takes 1/2   And would prefer a generic medication due to cost    Review of Systems - ROS  Constitution: Negative for weakness, weight gain and weight loss.   HENT: Negative for congestion.    Eyes: Negative for blurred vision.   Cardiovascular: As mentioned above  Respiratory: Negative for cough and hemoptysis.    Endocrine: Negative for polydipsia and polyuria.   Hematologic/Lymphatic: Negative for bleeding problem. Does not bruise/bleed easily.   Skin: Negative for flushing.   Musculoskeletal: Negative for neck pain and stiffness.   Gastrointestinal: Negative for abdominal pain, diarrhea, jaundice, melena, nausea and vomiting.   Genitourinary: Negative for dysuria and hematuria.   Neurological: Negative for dizziness, focal weakness and numbness.   Psychiatric/Behavioral: Negative for altered mental status and depression.          All other systems were reviewed and were negative.    family history includes Clotting disorder in his paternal grandfather; Dementia in his maternal grandmother; Heart attack (age of onset: 81) in his mother; Heart disease in his father; Hypertension in his brother; No Known Problems in his maternal grandfather and paternal grandmother; Sudden death in an other family member.     reports that he has never smoked. He has never used smokeless tobacco. He reports that he does not drink alcohol and does not use drugs.    No Known  Allergies      Current Outpatient Medications:   •  aspirin 81 MG chewable tablet, Chew 1 tablet Daily., Disp: , Rfl:   •  Bismuth Subsalicylate (STOMACH RELIEF PO), Take  by mouth As Needed., Disp: , Rfl:   •  diphenhydrAMINE (BENADRYL) 25 mg capsule, Take 1 capsule by mouth As Needed for Itching., Disp: , Rfl:   •  Edarbyclor 40-25 MG tablet, TAKE 1 TABLET BY MOUTH ONCE DAILY (Patient taking differently: 1/2 tab daily), Disp: 90 tablet, Rfl: 3  •  metoprolol succinate XL (TOPROL-XL) 50 MG 24 hr tablet, Take 1 tablet by mouth once daily, Disp: 30 tablet, Rfl: 11  •  multivitamin with minerals tablet tablet, Take 1 tablet by mouth Daily., Disp: , Rfl:   •  Omega-3 Fatty Acids (FISH OIL BURP-LESS) 1200 MG capsule, Take 1,000 mg by mouth Daily., Disp: , Rfl:   •  omeprazole (priLOSEC) 40 MG capsule, Take 1 capsule by mouth once daily (Patient taking differently: Take 20 mg by mouth Daily.), Disp: 30 capsule, Rfl: 0  •  sennosides-docusate (PERICOLACE) 8.6-50 MG per tablet, 1-2 tablets Daily., Disp: , Rfl:   •  tiZANidine (ZANAFLEX) 4 MG tablet, TAKE 1 TABLET BY MOUTH TWICE DAILY AS NEEDED FOR MUSCLE SPASM, Disp: 30 tablet, Rfl: 0  •  vitamin C (ASCORBIC ACID) 500 MG tablet, Take 2 tablets by mouth Daily., Disp: , Rfl:   •  Nhbjdm-FwDsj-BvMtbg-FA-Omega (MULTIVITAMIN/MINERALS PO), Med Name: multivitamin & minerals #11-folic acid oral (Patient not taking: Reported on 3/20/2023), Disp: , Rfl:   •  Semaglutide-Weight Management (semaglutide) 0.25 MG/0.5ML solution auto-injector, Inject 0.25 mg under the skin into the appropriate area as directed 1 (One) Time Per Week. (Patient not taking: Reported on 3/20/2023), Disp: 1 mL, Rfl: 1  •  traZODone (DESYREL) 50 MG tablet, Take 1/2 to 1 tablet by mouth 30 minutes prior to bedtime as needed for help with sleep (Patient not taking: Reported on 3/20/2023), Disp: 30 tablet, Rfl: 2    Physical Exam:  Vitals:    03/20/23 1048   BP: 148/80   BP Location: Left arm   Patient Position:  "Sitting   Cuff Size: Large Adult   Pulse: 71   SpO2: 98%   Weight: (!) 137 kg (302 lb 9.6 oz)   Height: 180.3 cm (71\")   PainSc: 0-No pain   PainLoc: Chest     Current Pain Level: none  Pulse Ox: Normal  on room air  General: alert, appears stated age and cooperative     Body Habitus: well-nourished    HEENT: Head: Normocephalic, no lesions, without obvious abnormality. No arcus senilis, xanthelasma or xanthomas.    Neuro: alert, oriented x3  Pulses: 2+ and symmetric  JVP: Volume/Pulsation: Normal.  Normal waveforms.   Appropriate inspiratory decrease.  No Kussmaul's. No Bárbara's.   Carotid Exam: no bruit normal pulsation bilaterally   Carotid Volume: normal.     Respirations: no increased work of breathing   Chest:  Normal    Pulmonary:Normal   Precordium: Normal impulses. P2 is not palpable.  RV Heave: absent  LV Heave: absent  Amana:  normal size and placement  Palpable S4: absent.  Heart rate: normal    Heart Rhythm: regular     Heart Sounds: S1: normal  S2: normal  S3: absent   S4: absent  Opening Snap: absent    Pericardial Rub:  Absent: .    Abdomen:   Appearance: normal .  Palpation: Soft, non-tender to palpation, bowel sounds positive in all four quadrants; no guarding or rebound tenderness  Extremity: 1+ edema.   LE Skin: no rashes  LE Hair:  normal  LE Pulses: well perfused with normal pulses in the distal extremities  Pallor on elevation: Absent. Rubor on dependency: None      DATA REVIEWED:     EKG. I personally reviewed and interpreted the EKG.  NSR    ECG/EMG Results (all)     Procedure Component Value Units Date/Time    ECG 12 Lead [[id number]] Collected: 03/20/23 1045     Updated: 03/20/23 1046     QT Interval 374 ms      QTC Interval 406 ms     Narrative:      Test Reason : palpitations  Blood Pressure :   */*   mmHG  Vent. Rate :  71 BPM     Atrial Rate :  71 BPM     P-R Int : 178 ms          QRS Dur : 110 ms      QT Int : 374 ms       P-R-T Axes :  74  72  71 degrees     QTc Int : 406 " ms    Normal sinus rhythm  RSR' or QR pattern in V1 suggests right ventricular conduction delay  Borderline ECG  When compared with ECG of 07-DEC-2021 12:58,  No significant change was found    Referred By:            Confirmed By:         ---------------------------------------------------  TTE/HERSON:  Results for orders placed in visit on 03/06/20    Adult Transthoracic Echo Complete W/ Cont if Necessary Per Protocol    Interpretation Summary  · Mild tricuspid valve regurgitation is present.  · Normal EF and wall motion.      --------------------------------------------------------------------------------------------------  LABS:     The CVD Risk score (Juhi, et al., 2008) failed to calculate for the following reasons:    Cannot find a previous HDL lab    Cannot find a previous total cholesterol lab         Lab Results   Component Value Date    GLUCOSE 129 (H) 04/08/2022    BUN 19 04/08/2022    CREATININE 1.23 04/08/2022    EGFRIFNONA 55 06/15/2021    BCR 15.4 04/08/2022    K 3.7 04/08/2022    CO2 27.0 04/08/2022    CALCIUM 9.6 04/08/2022    ALBUMIN 4.90 12/20/2019    AST 23 12/20/2019    ALT 19 12/20/2019     Lab Results   Component Value Date    WBC 7.16 12/20/2019    HGB 16.0 12/20/2019    HCT 44.2 12/20/2019    MCV 85.2 12/20/2019     12/20/2019     Lab Results   Component Value Date    CHOL 150 12/20/2019    TRIG 97 12/20/2019    HDL 42 12/20/2019    LDL 89 12/20/2019     Lab Results   Component Value Date    TSH 2.590 04/08/2022    X0YMQWX 6.94 04/08/2022     No results found for: CKTOTAL, CKMB, CKMBINDEX, TROPONINI, TROPONINT  Lab Results   Component Value Date    HGBA1C 5.29 12/20/2019     No results found for: DDIMER  Lab Results   Component Value Date    ALT 19 12/20/2019     Lab Results   Component Value Date    HGBA1C 5.29 12/20/2019    HGBA1C 5.2 06/28/2018    HGBA1C 5.0 09/13/2017     Lab Results   Component Value Date    CREATININE 1.23 04/08/2022     No results found for: IRON, TIBC,  FERRITIN  No results found for: INR, PROTIME   Diagnosis Plan   1. Palpitations  ECG 12 Lead      2. Essential hypertension        3. SALGUERO (dyspnea on exertion)        4. Edema leg            ASSESSMENT & PLAN  1. plap controlled on BB  2. SALGUERO worsen and concerning complte prior order for stress test   3. Edema resume HCTZ 25 mg daily and keep bp log   Stop edarbi start diovan / HCTZ    This document has been electronically signed by Samuel Archuleta DO on March 20, 2023 11:09 CDT

## 2023-03-20 NOTE — TELEPHONE ENCOUNTER
Delmi Lucas, Heriberto Rep  P [facility] [place]  Cy Blair is needing a refill on his metoprolol called into [facility] in [place]. Stated he forget to tell Dr. Archuleta about that when he was in the room with him. Also would like a call when it has been sent in. Return number is [phone]. Thanks.    Voicemail left letting the patient know metoprolol was sent to his pharmacy.

## 2023-03-30 ENCOUNTER — HOSPITAL ENCOUNTER (OUTPATIENT)
Dept: NUCLEAR MEDICINE | Facility: HOSPITAL | Age: 63
Discharge: HOME | End: 2023-03-30
Payer: COMMERCIAL

## 2023-03-30 ENCOUNTER — OFFICE VISIT (OUTPATIENT)
Dept: CARDIOLOGY | Facility: CLINIC | Age: 63
End: 2023-03-30

## 2023-03-30 VITALS
BODY MASS INDEX: 42.04 KG/M2 | SYSTOLIC BLOOD PRESSURE: 124 MMHG | HEART RATE: 68 BPM | HEIGHT: 71 IN | WEIGHT: 300.3 LBS | DIASTOLIC BLOOD PRESSURE: 80 MMHG | OXYGEN SATURATION: 98 %

## 2023-03-30 DIAGNOSIS — R06.09 DOE (DYSPNEA ON EXERTION): ICD-10-CM

## 2023-03-30 DIAGNOSIS — I10 ESSENTIAL HYPERTENSION: Primary | ICD-10-CM

## 2023-03-30 PROCEDURE — A9500 TC99M SESTAMIBI: HCPCS | Performed by: INTERNAL MEDICINE

## 2023-03-30 PROCEDURE — 0 TECHNETIUM SESTAMIBI: Performed by: INTERNAL MEDICINE

## 2023-03-30 PROCEDURE — 99214 OFFICE O/P EST MOD 30 MIN: CPT | Performed by: NURSE PRACTITIONER

## 2023-03-30 PROCEDURE — 93017 CV STRESS TEST TRACING ONLY: CPT

## 2023-03-30 PROCEDURE — 78452 HT MUSCLE IMAGE SPECT MULT: CPT

## 2023-03-30 RX ORDER — AMLODIPINE BESYLATE 5 MG/1
5 TABLET ORAL DAILY
Qty: 30 TABLET | Refills: 6 | Status: SHIPPED | OUTPATIENT
Start: 2023-03-30 | End: 2023-05-12 | Stop reason: SINTOL

## 2023-03-30 RX ADMIN — TECHNETIUM TC 99M SESTAMIBI 1 DOSE: 1 INJECTION INTRAVENOUS at 07:36

## 2023-03-30 NOTE — PROGRESS NOTES
Palpitations      History of Present Illness    Mr. Cy Blair is a 63 year old male with medical history of palpitations, GERD, and hypertension.    In the past he has followed with Dr. Santiago.  He has done a CT to rule out renal artery stenosis due to resistant hypertension.  He is on metoprolol XL 50 mg daily and Edarbyclor 40-25 mg (Azilsartan-Chlorthalidone) for hypertension.  Wore Holter monitor in 2021 showing rare PACs and PVCs.  Atrial tachycardia with atypical winky block.  Times have improved with beta-blocker therapy.    He saw locum Dr. Archuleta 3/20/2023. Wanted off Edarbyclor due to cost. He wants to retire and this medication is too costly. Stopped Edarbyclor and started him on  Diovan/HCTZ.  Continued BB therapy for palpitations. Reordered echocardiogram and myocardial stress test that was not completed between visits.    Today, he presents for follow up. New mediation is not controlling B/P and he is having worsening LLE. Needing to find medications that he can afforded after FPC.  Myocardial nuclear stress test results given today.  LVEF preserved at 70%.  No evidence of poor perfusion.  Normal ECG during stress.  Low risk study for CAD.    The ASCVD Risk score (Alameda DK, et al., 2019) failed to calculate for the following reasons:    Cannot find a previous HDL lab    Cannot find a previous total cholesterol lab    Cardiac Risk Factors:  hypertension, Sedentary life style and Obesity    Past Medical History:   Diagnosis Date   • Candidiasis of skin     Candidiasis of skin - has flareups during the summer   • Essential (primary) hypertension      Past Surgical History:   Procedure Laterality Date   • KIDNEY STONE SURGERY     • NECK SURGERY      for herniated discs and spinal stenosis     Social History     Socioeconomic History   • Marital status:    Tobacco Use   • Smoking status: Never   • Smokeless tobacco: Never   Vaping Use   • Vaping Use: Never used   Substance and Sexual  Activity   • Alcohol use: No   • Drug use: No   • Sexual activity: Defer     Family History   Problem Relation Age of Onset   • Heart attack Mother 81        POSSIBLE MI   • Heart disease Father    • Sudden death Other    • Hypertension Brother    • Dementia Maternal Grandmother    • No Known Problems Maternal Grandfather    • No Known Problems Paternal Grandmother    • Clotting disorder Paternal Grandfather        ALLERGIES:  No Known Allergies      Review of Systems   Constitutional: Positive for malaise/fatigue. Negative for diaphoresis and night sweats.   Cardiovascular: Positive for dyspnea on exertion and leg swelling. Negative for chest pain, near-syncope, orthopnea, palpitations, paroxysmal nocturnal dyspnea and syncope.   Respiratory: Negative for cough and shortness of breath.    Endocrine: Negative for polydipsia, polyphagia and polyuria.   Hematologic/Lymphatic: Negative for bleeding problem. Does not bruise/bleed easily.   Musculoskeletal: Negative for muscle cramps, muscle weakness and myalgias.   Gastrointestinal: Negative for bloating, abdominal pain, nausea and vomiting.   Neurological: Positive for weakness. Negative for dizziness, light-headedness and loss of balance.       Current Outpatient Medications   Medication Sig Dispense Refill   • aspirin 81 MG chewable tablet Chew 1 tablet Daily.     • Bismuth Subsalicylate (STOMACH RELIEF PO) Take  by mouth As Needed.     • diphenhydrAMINE (BENADRYL) 25 mg capsule Take 1 capsule by mouth As Needed for Itching.     • hydroCHLOROthiazide (HYDRODIURIL) 25 MG tablet Take 1 tablet by mouth Daily. 90 tablet 3   • metoprolol succinate XL (TOPROL-XL) 50 MG 24 hr tablet Take 1 tablet by mouth once daily 30 tablet 6   • multivitamin with minerals tablet tablet Take 1 tablet by mouth Daily.     • Omega-3 Fatty Acids (FISH OIL BURP-LESS) 1200 MG capsule Take 1,000 mg by mouth Daily.     • omeprazole (priLOSEC) 40 MG capsule Take 1 capsule by mouth once daily  (Patient taking differently: Take 20 mg by mouth Daily.) 30 capsule 0   • Ypepir-VeIgp-UdPmjh-FA-Omega (MULTIVITAMIN/MINERALS PO) Med Name: multivitamin & minerals #11-folic acid oral (Patient not taking: Reported on 3/20/2023)     • Semaglutide-Weight Management (semaglutide) 0.25 MG/0.5ML solution auto-injector Inject 0.25 mg under the skin into the appropriate area as directed 1 (One) Time Per Week. (Patient not taking: Reported on 3/20/2023) 1 mL 1   • sennosides-docusate (PERICOLACE) 8.6-50 MG per tablet 1-2 tablets Daily.     • tiZANidine (ZANAFLEX) 4 MG tablet TAKE 1 TABLET BY MOUTH TWICE DAILY AS NEEDED FOR MUSCLE SPASM 30 tablet 0   • traZODone (DESYREL) 50 MG tablet Take 1/2 to 1 tablet by mouth 30 minutes prior to bedtime as needed for help with sleep (Patient not taking: Reported on 3/20/2023) 30 tablet 2   • valsartan (DIOVAN) 160 MG tablet Take 1 tablet by mouth Daily. 90 tablet 3   • vitamin C (ASCORBIC ACID) 500 MG tablet Take 2 tablets by mouth Daily.       No current facility-administered medications for this visit.       OBJECTIVE:    Physical Exam:   Vitals reviewed.   Constitutional:       Appearance: Healthy appearance. Well-developed, well-groomed and not in distress. Morbidly obese. Not ill-appearing or diaphoretic.   Eyes:      General: Lids are normal.         Right eye: No discharge.         Left eye: No discharge.      Conjunctiva/sclera: Conjunctivae normal.      Pupils: Pupils are equal, round, and reactive to light.   HENT:      Head: Normocephalic and atraumatic.    Mouth/Throat:      Lips: Pink.      Mouth: Mucous membranes are moist.   Neck:      Thyroid: No thyromegaly.      Vascular: No JVD. JVD normal.      Trachea: Trachea normal.   Pulmonary:      Effort: Pulmonary effort is normal.      Breath sounds: Normal breath sounds and air entry. No wheezing.   Cardiovascular:      PMI at left midclavicular line. Normal rate. Regular rhythm. Normal S1 with normal intensity. Normal S2  "with normal intensity.      Murmurs: There is no murmur.      No gallop. No click.   Edema:     Peripheral edema absent.   Skin:     General: Skin is warm and dry.      Capillary Refill: Capillary refill takes less than 2 seconds.   Neurological:      Mental Status: Alert, oriented to person, place, and time and oriented to person, place and time.   Psychiatric:         Attention and Perception: Attention normal.         Mood and Affect: Mood normal.         Speech: Speech normal.         Thought Content: Thought content normal.       Vitals:    03/30/23 1119   Height: 180.3 cm (71\")       DATA REVIEWED:   Results for orders placed in visit on 03/06/20    Adult Transthoracic Echo Complete W/ Cont if Necessary Per Protocol    Interpretation Summary  · Mild tricuspid valve regurgitation is present.  · Normal EF and wall motion.      No radiology results for the last 30 days.  CXR:     CT:     Labs: BMP, CBC, LIPID, TSH  Lab Results   Component Value Date    GLUCOSE 129 (H) 04/08/2022    CALCIUM 9.6 04/08/2022     (L) 04/08/2022    K 3.7 04/08/2022    CO2 27.0 04/08/2022     04/08/2022    BUN 19 04/08/2022    CREATININE 1.23 04/08/2022    EGFRIFNONA 55 06/15/2021    BCR 15.4 04/08/2022    ANIONGAP 8.0 04/08/2022     Lab Results   Component Value Date    WBC 7.16 12/20/2019    HGB 16.0 12/20/2019    HCT 44.2 12/20/2019    MCV 85.2 12/20/2019     12/20/2019     Lab Results   Component Value Date    CHOL 150 12/20/2019    CHOL 138 (L) 06/28/2018    CHOL 150 09/13/2017     Lab Results   Component Value Date    TRIG 97 12/20/2019    TRIG 110 06/28/2018    TRIG 145 09/13/2017     Lab Results   Component Value Date    HDL 42 12/20/2019    HDL 39 (L) 06/28/2018    HDL 39 09/13/2017     No components found for: LDLCALC  Lab Results   Component Value Date    LDL 89 12/20/2019    LDL 77 06/28/2018    LDL 82 09/13/2017     No results found for: HDLLDLRATIO  No components found for: CHOLHDL  Lab Results "   Component Value Date    TSH 2.590 04/08/2022     No results found for: PROBNP  EKG:     TTE: 6/10/2020  Interetation Summary    • Mild tricuspid valve regurgitation is present.  • Normal EF and wall motion.    Stress tests:  6/20/2020  Stress Procedure    Rest ECG Baseline ECG of normal sinus rhythm noted.   There was no ST segment deviation noted.   Stress Description A stress test was performed following the Rigoberto protocol.   The patient reached the end of the protocol.   The patient reported no symptoms during the stress test.   Blood pressure demonstrated a hypertensive response to stress.   Stress ECG Stress ECG of normal sinus rhythm noted. Non-specific ST-T wave changes noted.   Stress Findings No ECG evidence of myocardial ischemia.Negative clinical evidence of myocardial ischemia. Findings consistent with a normal ECG stress test.     Holter 2021   Interpretation Summary    • A relatively benign monitor study.  • Rare Pac's and PVC's and Atrial tachycardia with atypical wenkebach.    The following portions of the patient's history were reviewed and updated as appropriate: allergies, current medications, past family history, past medical history, past social history, past surgical history and problem list.  Old records reviewed and pertinent information is included in the above objective data.     ASSESSMENT/PLAN:     Diagnosis Plan   1. Essential hypertension          1.  Hypertension, essential.  He is a former Dr. Santiago patient.  He put him on a metoprolol XL for palpitations and blood pressure.  He also put him on a combination medication called Edarbyclor (ARB/thiazide diuretic).  He is concerned about the cost of medication and wants to try a new medication    Nuclear stress test low risk for ischemic heart disease.  LVEF preserved at 70%.  ECG during stress without evidence of ischemia.    Stop Edarbyclor  Start amlodipine 5 mg daily can uptitrate to 10mg if tolerating.  Continue metoprolol XL 50 mg  daily  Discussed decreasing dietary salt intake, avoiding extra salt with saltshaker and using salt substitute.  Discussed regular exercise such as brisk walking 30 to 40 minutes/4-5 times weekly    I spent 32 minutes caring for Cy on this date of service. This time includes time spent by me in the following activities: preparing for the visit, reviewing tests, obtaining and/or reviewing a separately obtained history, performing a medically appropriate examination and/or evaluation, counseling and educating the patient/family/caregiver, ordering medications, tests, or procedures, documenting information in the medical record and care coordination  Return in about 6 weeks (around 5/11/2023) for Recheck.        Seen on 3/30/2023, chart completed late  This document has been electronically signed by MARTINA Smith on May 29, 2023 11:19 CDT   Electronically signed by MARTINA Smith, 05/29/23, 11:19 AM CDT.

## 2023-03-31 ENCOUNTER — HOSPITAL ENCOUNTER (OUTPATIENT)
Dept: NUCLEAR MEDICINE | Facility: HOSPITAL | Age: 63
Discharge: HOME | End: 2023-03-31
Payer: COMMERCIAL

## 2023-03-31 ENCOUNTER — HOSPITAL ENCOUNTER (OUTPATIENT)
Dept: CARDIOLOGY | Facility: HOSPITAL | Age: 63
Discharge: HOME | End: 2023-03-31
Payer: COMMERCIAL

## 2023-03-31 LAB
BH CV REST NUCLEAR ISOTOPE DOSE: 36.9 MCI
BH CV STRESS COMMENTS STAGE 1: NORMAL
BH CV STRESS DOSE REGADENOSON STAGE 1: 0.4
BH CV STRESS DURATION MIN STAGE 1: 0
BH CV STRESS DURATION SEC STAGE 1: 10
BH CV STRESS HR STAGE 1: 56
BH CV STRESS NUCLEAR ISOTOPE DOSE: 35.4 MCI
BH CV STRESS PROTOCOL 1: NORMAL
BH CV STRESS RECOVERY BP: NORMAL MMHG
BH CV STRESS RECOVERY HR: 72 BPM
BH CV STRESS STAGE 1: 1
LV EF NUC BP: 70 %
MAXIMAL PREDICTED HEART RATE: 156 BPM
PERCENT MAX PREDICTED HR: 50 %
STRESS BASELINE BP: NORMAL MMHG
STRESS BASELINE HR: 59 BPM
STRESS PERCENT HR: 59 %
STRESS POST EXERCISE DUR SEC: 45 SEC
STRESS POST PEAK BP: NORMAL MMHG
STRESS POST PEAK HR: 78 BPM
STRESS TARGET HR: 133 BPM

## 2023-03-31 PROCEDURE — 25010000002 REGADENOSON 0.4 MG/5ML SOLUTION: Performed by: INTERNAL MEDICINE

## 2023-03-31 PROCEDURE — 0 TECHNETIUM SESTAMIBI: Performed by: INTERNAL MEDICINE

## 2023-03-31 PROCEDURE — A9500 TC99M SESTAMIBI: HCPCS | Performed by: INTERNAL MEDICINE

## 2023-03-31 RX ORDER — SODIUM CHLORIDE 0.9 % (FLUSH) 0.9 %
10 SYRINGE (ML) INJECTION ONCE
Status: COMPLETED | OUTPATIENT
Start: 2023-03-31 | End: 2023-03-31

## 2023-03-31 RX ADMIN — TECHNETIUM TC 99M SESTAMIBI 1 DOSE: 1 INJECTION INTRAVENOUS at 08:11

## 2023-03-31 RX ADMIN — Medication 10 ML: at 08:10

## 2023-03-31 RX ADMIN — REGADENOSON 0.4 MG: 0.08 INJECTION, SOLUTION INTRAVENOUS at 08:10

## 2023-04-06 ENCOUNTER — TELEPHONE (OUTPATIENT)
Dept: CARDIOLOGY | Facility: CLINIC | Age: 63
End: 2023-04-06
Payer: COMMERCIAL

## 2023-04-06 NOTE — TELEPHONE ENCOUNTER
Contacted patient per Josseline Oro about results. Patient was unavailable and left voicemail.    ----- Message from MARTINA Smith sent at 4/6/2023  2:57 PM CDT -----  Can you call and let him know that his stress test was low risk for heart blockages.  This is a good finding.  ----- Message -----  From: Zena Taveras MD  Sent: 3/31/2023   5:47 PM CDT  To: MARTINA Smith    Looks like your patient that [facility] ordered stress test. Lwo risk

## 2023-05-12 ENCOUNTER — TELEPHONE (OUTPATIENT)
Dept: CARDIOLOGY | Facility: CLINIC | Age: 63
End: 2023-05-12
Payer: COMMERCIAL

## 2023-05-12 DIAGNOSIS — I10 ESSENTIAL HYPERTENSION: Primary | ICD-10-CM

## 2023-05-12 RX ORDER — OLMESARTAN MEDOXOMIL 20 MG/1
20 TABLET ORAL DAILY
Qty: 30 TABLET | Refills: 0 | Status: SHIPPED | OUTPATIENT
Start: 2023-05-12 | End: 2023-05-12

## 2023-05-12 RX ORDER — OLMESARTAN MEDOXOMIL 20 MG/1
20 TABLET ORAL DAILY
Qty: 30 TABLET | Refills: 0 | Status: SHIPPED | OUTPATIENT
Start: 2023-05-12

## 2023-05-12 NOTE — TELEPHONE ENCOUNTER
We discussed medications.  Amlodipine he had side effects of feeling hot and apical swelling.  He has stopped this medication and restarted his Edarbi.  Edarbi is too expensive for him to continue.  He wants another medication.  He also during an episode on Sunday had elevated heart rate.  Reported a heart rate up in the 120s for about 12 hours.    We discussed changing his Austin core to olmesartan 20 mg in the morning with HCTZ 25 mg daily.  Continuing his Toprol XL 50 mg daily.  If his elevated heart rate returns.  He can take a and another Toprol XL to see if this relieves heart rate.    He is okay with this.  I asked him to call me midweek next week to let me know how he is doing.  I plan to see him the following week when I am in office

## 2023-06-05 ENCOUNTER — OFFICE VISIT (OUTPATIENT)
Dept: CARDIOLOGY | Facility: CLINIC | Age: 63
End: 2023-06-05
Payer: MEDICARE

## 2023-06-05 VITALS
WEIGHT: 306.1 LBS | SYSTOLIC BLOOD PRESSURE: 145 MMHG | OXYGEN SATURATION: 98 % | HEART RATE: 72 BPM | HEIGHT: 71 IN | BODY MASS INDEX: 42.85 KG/M2 | DIASTOLIC BLOOD PRESSURE: 86 MMHG

## 2023-06-05 DIAGNOSIS — I10 ESSENTIAL HYPERTENSION: Primary | ICD-10-CM

## 2023-06-05 DIAGNOSIS — R00.2 PALPITATIONS: ICD-10-CM

## 2023-06-05 DIAGNOSIS — E66.01 MORBID OBESITY WITH BMI OF 40.0-44.9, ADULT: ICD-10-CM

## 2023-06-05 NOTE — PROGRESS NOTES
Essential hypertension      History of Present Illness    Mr. Cy Blair is a 63 year old male with medical history of palpitations, GERD, and hypertension.    In the past he has followed with Dr. Santiago.  He has done a CT to rule out renal artery stenosis due to resistant hypertension.  He is on metoprolol XL 50 mg daily and Edarbyclor 40-25 mg (Azilsartan-Chlorthalidone) for hypertension.  Wore Holter monitor in 2021 showing rare PACs and PVCs.  Atrial tachycardia with atypical winky block.  Times have improved with beta-blocker therapy.    He saw locum Dr. Archuleta 3/20/2023. Wanted off Edarbyclor due to cost. He wants to retire and this medication is too costly. Stopped Edarbyclor and started him on  Diovan/HCTZ.  Continued BB therapy for palpitations. Reordered echocardiogram and myocardial stress test that was not completed between visits.    3/30/23, he presents for follow up. New mediation is not controlling B/P and he is having worsening LLE. Needing to find medications that he can afforded after half-way.  Myocardial nuclear stress test results given today.  LVEF preserved at 70%.  No evidence of poor perfusion.  Normal ECG during stress.  Low risk study for CAD.    Today, he presents for first visit after changing a Edarbyclor to olmesartan.  He is retired now and adjusting to this new life.  He presents with blood pressure log most pressures are normotensive except for 1 day he developed hypotension in the evening.  It was after he had been taking over-the-counter bowel prep and Benadryl the night before to help him sleep.  He may of vasovagal and because he experienced palpitations and heart racing after he had an episode of diarrhea.  First from IBS- C.  Not had any other episode.  But this is the same type of situation that led him to seek Dr. Santiago's expertise.    Still having some palpitations.  During episode of hypotension felt like his heart rate was taking off.  Took another metoprolol before  realizing that his blood pressure had dropped.      The ASCVD Risk score (Michael DK, et al., 2019) failed to calculate for the following reasons:    Cannot find a previous HDL lab    Cannot find a previous total cholesterol lab    Cardiac Risk Factors:  hypertension, Sedentary life style and Obesity    Past Medical History:   Diagnosis Date    Candidiasis of skin     Candidiasis of skin - has flareups during the summer    Essential (primary) hypertension      Past Surgical History:   Procedure Laterality Date    KIDNEY STONE SURGERY      NECK SURGERY      for herniated discs and spinal stenosis     Social History     Socioeconomic History    Marital status:    Tobacco Use    Smoking status: Never    Smokeless tobacco: Never   Vaping Use    Vaping Use: Never used   Substance and Sexual Activity    Alcohol use: No    Drug use: No    Sexual activity: Defer     Family History   Problem Relation Age of Onset    Heart attack Mother 81        POSSIBLE MI    Heart disease Father     Sudden death Other     Hypertension Brother     Dementia Maternal Grandmother     No Known Problems Maternal Grandfather     No Known Problems Paternal Grandmother     Clotting disorder Paternal Grandfather        ALLERGIES:  No Known Allergies      Review of Systems   Constitutional: Positive for malaise/fatigue. Negative for diaphoresis and night sweats.   Eyes:         Vision changes during hypotension of episode   Cardiovascular:  Positive for irregular heartbeat and palpitations. Negative for chest pain, dyspnea on exertion, leg swelling, orthopnea and paroxysmal nocturnal dyspnea.   Respiratory:  Negative for shortness of breath and sleep disturbances due to breathing.    Endocrine: Negative for polydipsia, polyphagia and polyuria.   Musculoskeletal:  Negative for muscle cramps and myalgias.   Gastrointestinal:  Positive for constipation. Negative for abdominal pain and nausea.   Neurological:  Negative for dizziness,  light-headedness and loss of balance.     Current Outpatient Medications   Medication Sig Dispense Refill    aspirin 81 MG chewable tablet Chew 1 tablet Daily.      Bismuth Subsalicylate (STOMACH RELIEF PO) Take  by mouth As Needed.      diphenhydrAMINE (BENADRYL) 25 mg capsule Take 1 capsule by mouth As Needed for Itching.      hydroCHLOROthiazide (HYDRODIURIL) 25 MG tablet Take 1 tablet by mouth Daily. 90 tablet 3    metoprolol succinate XL (TOPROL-XL) 50 MG 24 hr tablet Take 1 tablet by mouth once daily 30 tablet 6    multivitamin with minerals tablet tablet Take 1 tablet by mouth Daily.      olmesartan (Benicar) 20 MG tablet Take 1 tablet by mouth Daily. 30 tablet 0    Omega-3 Fatty Acids (FISH OIL BURP-LESS) 1200 MG capsule Take 1,000 mg by mouth Daily.      omeprazole (priLOSEC) 40 MG capsule Take 1 capsule by mouth once daily (Patient taking differently: Take 20 mg by mouth Daily.) 30 capsule 0    vitamin C (ASCORBIC ACID) 500 MG tablet Take 2 tablets by mouth Daily.      Yyzcav-TxHfd-PcXale-FA-Omega (MULTIVITAMIN/MINERALS PO) Med Name: multivitamin & minerals #11-folic acid oral (Patient not taking: Reported on 6/5/2023)      Semaglutide-Weight Management (semaglutide) 0.25 MG/0.5ML solution auto-injector Inject 0.25 mg under the skin into the appropriate area as directed 1 (One) Time Per Week. (Patient not taking: Reported on 6/5/2023) 1 mL 1    sennosides-docusate (PERICOLACE) 8.6-50 MG per tablet 1-2 tablets Daily. (Patient not taking: Reported on 6/5/2023)      tiZANidine (ZANAFLEX) 4 MG tablet TAKE 1 TABLET BY MOUTH TWICE DAILY AS NEEDED FOR MUSCLE SPASM (Patient not taking: Reported on 6/5/2023) 30 tablet 0    traZODone (DESYREL) 50 MG tablet Take 1/2 to 1 tablet by mouth 30 minutes prior to bedtime as needed for help with sleep (Patient not taking: Reported on 6/5/2023) 30 tablet 2     No current facility-administered medications for this visit.       OBJECTIVE:    Physical Exam:   Vitals reviewed.  "  Constitutional:       Appearance: Healthy appearance. Well-developed, well-groomed and not in distress. Morbidly obese.   Eyes:      General: Lids are normal.         Right eye: No discharge.         Left eye: No discharge.      Conjunctiva/sclera: Conjunctivae normal.   HENT:      Head: Normocephalic and atraumatic.      Right Ear: External ear normal.      Left Ear: External ear normal.      Nose: Nose normal.    Mouth/Throat:      Lips: Pink.      Mouth: Mucous membranes are moist.   Neck:      Vascular: No JVD.      Trachea: Trachea normal.   Pulmonary:      Effort: Pulmonary effort is normal.      Breath sounds: Normal breath sounds and air entry.   Cardiovascular:      Normal rate. Regular rhythm. Normal S1. Normal S2.       Murmurs: There is no murmur.      No gallop.    Edema:     Peripheral edema absent.   Skin:     General: Skin is warm and dry.      Capillary Refill: Capillary refill takes less than 2 seconds.      Coloration: Skin is not pale.   Neurological:      Mental Status: Alert, oriented to person, place, and time and oriented to person, place and time.      Gait: Gait is intact.   Psychiatric:         Attention and Perception: Attention normal.         Mood and Affect: Mood normal.         Speech: Speech normal.         Thought Content: Thought content normal.     Vitals:    06/05/23 1517   BP: 145/86   BP Location: Left arm   Patient Position: Sitting   Cuff Size: Adult   Pulse: 72   SpO2: 98%   Weight: (!) 139 kg (306 lb 1.6 oz)   Height: 180.3 cm (71\")       DATA REVIEWED:   Results for orders placed in visit on 03/06/20    Adult Transthoracic Echo Complete W/ Cont if Necessary Per Protocol    Interpretation Summary  · Mild tricuspid valve regurgitation is present.  · Normal EF and wall motion.      No radiology results for the last 30 days.  CXR:     CT:     Labs: BMP, CBC, LIPID, TSH  Lab Results   Component Value Date    GLUCOSE 129 (H) 04/08/2022    CALCIUM 9.6 04/08/2022     (L) " 04/08/2022    K 3.7 04/08/2022    CO2 27.0 04/08/2022     04/08/2022    BUN 19 04/08/2022    CREATININE 1.23 04/08/2022    EGFRIFNONA 55 06/15/2021    BCR 15.4 04/08/2022    ANIONGAP 8.0 04/08/2022     Lab Results   Component Value Date    WBC 7.16 12/20/2019    HGB 16.0 12/20/2019    HCT 44.2 12/20/2019    MCV 85.2 12/20/2019     12/20/2019     Lab Results   Component Value Date    CHOL 150 12/20/2019    CHOL 138 (L) 06/28/2018    CHOL 150 09/13/2017     Lab Results   Component Value Date    TRIG 97 12/20/2019    TRIG 110 06/28/2018    TRIG 145 09/13/2017     Lab Results   Component Value Date    HDL 42 12/20/2019    HDL 39 (L) 06/28/2018    HDL 39 09/13/2017     No components found for: LDLCALC  Lab Results   Component Value Date    LDL 89 12/20/2019    LDL 77 06/28/2018    LDL 82 09/13/2017     No results found for: HDLLDLRATIO  No components found for: CHOLHDL  Lab Results   Component Value Date    TSH 2.590 04/08/2022     No results found for: PROBNP  EKG:     TTE: 6/10/2020  Interetation Summary    Mild tricuspid valve regurgitation is present.  Normal EF and wall motion.    Stress tests:  6/20/2020  Stress Procedure    Rest ECG Baseline ECG of normal sinus rhythm noted.   There was no ST segment deviation noted.   Stress Description A stress test was performed following the Rigoberto protocol.   The patient reached the end of the protocol.   The patient reported no symptoms during the stress test.   Blood pressure demonstrated a hypertensive response to stress.   Stress ECG Stress ECG of normal sinus rhythm noted. Non-specific ST-T wave changes noted.   Stress Findings No ECG evidence of myocardial ischemia.Negative clinical evidence of myocardial ischemia. Findings consistent with a normal ECG stress test.     Holter 2021   Interpretation Summary    A relatively benign monitor study.  Rare Pac's and PVC's and Atrial tachycardia with atypical wenkebach.    The following portions of the patient's  history were reviewed and updated as appropriate: allergies, current medications, past family history, past medical history, past social history, past surgical history and problem list.  Old records reviewed and pertinent information is included in the above objective data.     ASSESSMENT/PLAN:     Diagnosis Plan   1. Essential hypertension        2. Palpitations          1./2.  Hypertension, essential/Palpitations He is a former Dr. Sanitago patient.  He put him on a metoprolol XL for palpitations and blood pressure.  He also put him on a combination medication called Edarbyclor (ARB/thiazide diuretic).  He is concerned about the cost of medication and wants to try a new medication    Nuclear stress test low risk for ischemic heart disease.  LVEF preserved at 70%.  ECG during stress without evidence of ischemia.    Intolerant of amlodipine due to side effects  Stop Edarbyclor due to cost    He is retired now.    Had 1 episode of hypotension after bowel prep and Benadryl the night before to help him sleep.  He suffers from IBS- C.  During diarrhea or evacuating, his heart rate elevated and he took another 1/2 pill (25mg) of metoprolol.  Took blood pressure after that and was hypotensive.  He did not feel well and also had vision changes.    It is most likely a transient situation related to vasovagal during bowel movement.    Continue current antihypertensive medication with olmesartan 20 mg daily   We will increase metoprolol XL to 75 mg daily for 1 week.    Directed him to call the office for update in 1 week regarding palpitations.    2.  Morbid Obesity, BMI 40-44.9.      Newly retired.  Encouraged him to set a routine that allows for routine scheduled healthy meals and activity.    Encouraged him to incorporate activity such as swimming or stationary bicycling daily and to cut calories in order to lose weight.    I spent 26 minutes caring for Cy on this date of service. This time includes time spent by me in the  following activities: preparing for the visit, reviewing tests, obtaining and/or reviewing a separately obtained history, performing a medically appropriate examination and/or evaluation, counseling and educating the patient/family/caregiver, ordering medications, tests, or procedures, documenting information in the medical record and care coordination  Return in about 3 months (around 9/5/2023) for Recheck.        This document has been electronically signed by MARTINA Smith on June 5, 2023 16:33 CDT   Electronically signed by MARTINA Smith, 06/05/23, 4:33 PM CDT.

## 2023-06-06 DIAGNOSIS — I10 ESSENTIAL HYPERTENSION: ICD-10-CM

## 2023-06-06 RX ORDER — OLMESARTAN MEDOXOMIL 20 MG/1
20 TABLET ORAL DAILY
Qty: 30 TABLET | Refills: 5 | Status: SHIPPED | OUTPATIENT
Start: 2023-06-06

## 2023-06-13 ENCOUNTER — LAB (OUTPATIENT)
Dept: LAB | Facility: OTHER | Age: 63
End: 2023-06-13
Payer: MEDICARE

## 2023-06-13 DIAGNOSIS — I10 ESSENTIAL HYPERTENSION: ICD-10-CM

## 2023-06-13 DIAGNOSIS — R79.9 ABNORMAL FINDING OF BLOOD CHEMISTRY, UNSPECIFIED: ICD-10-CM

## 2023-06-13 DIAGNOSIS — Z13.1 DIABETES MELLITUS SCREENING: ICD-10-CM

## 2023-06-13 DIAGNOSIS — Z12.5 PROSTATE CANCER SCREENING: ICD-10-CM

## 2023-06-13 LAB
ALBUMIN SERPL-MCNC: 4.2 G/DL (ref 3.5–5)
ALBUMIN/GLOB SERPL: 1.3 G/DL (ref 1.1–1.8)
ALP SERPL-CCNC: 73 U/L (ref 38–126)
ALT SERPL W P-5'-P-CCNC: 20 U/L
ANION GAP SERPL CALCULATED.3IONS-SCNC: 7 MMOL/L (ref 5–15)
AST SERPL-CCNC: 21 U/L (ref 17–59)
BILIRUB SERPL-MCNC: 0.8 MG/DL (ref 0.2–1.3)
BILIRUB UR QL STRIP: NEGATIVE
BUN SERPL-MCNC: 17 MG/DL (ref 7–23)
BUN/CREAT SERPL: 14.7 (ref 7–25)
CALCIUM SPEC-SCNC: 9.3 MG/DL (ref 8.4–10.2)
CHLORIDE SERPL-SCNC: 99 MMOL/L (ref 101–112)
CHOLEST SERPL-MCNC: 160 MG/DL (ref 150–200)
CLARITY UR: CLEAR
CO2 SERPL-SCNC: 31 MMOL/L (ref 22–30)
COLOR UR: YELLOW
CREAT SERPL-MCNC: 1.16 MG/DL (ref 0.7–1.3)
DEPRECATED RDW RBC AUTO: 43 FL (ref 37–54)
EGFRCR SERPLBLD CKD-EPI 2021: 69.9 ML/MIN/1.73
EOSINOPHIL # BLD MANUAL: 0.18 10*3/MM3 (ref 0–0.4)
EOSINOPHIL NFR BLD MANUAL: 3 % (ref 0.3–6.2)
ERYTHROCYTE [DISTWIDTH] IN BLOOD BY AUTOMATED COUNT: 13.4 % (ref 12.3–15.4)
GLOBULIN UR ELPH-MCNC: 3.2 GM/DL (ref 2.3–3.5)
GLUCOSE SERPL-MCNC: 101 MG/DL (ref 70–99)
GLUCOSE UR STRIP-MCNC: NEGATIVE MG/DL
HCT VFR BLD AUTO: 44.4 % (ref 37.5–51)
HDLC SERPL-MCNC: 41 MG/DL (ref 40–59)
HGB BLD-MCNC: 15.7 G/DL (ref 13–17.7)
HGB UR QL STRIP.AUTO: NEGATIVE
KETONES UR QL STRIP: NEGATIVE
LDLC SERPL CALC-MCNC: 97 MG/DL
LDLC/HDLC SERPL: 2.3 {RATIO} (ref 0–3.55)
LEUKOCYTE ESTERASE UR QL STRIP.AUTO: NEGATIVE
LYMPHOCYTES # BLD MANUAL: 1.66 10*3/MM3 (ref 0.7–3.1)
LYMPHOCYTES NFR BLD MANUAL: 6 % (ref 5–12)
MCH RBC QN AUTO: 32 PG (ref 26.6–33)
MCHC RBC AUTO-ENTMCNC: 35.4 G/DL (ref 31.5–35.7)
MCV RBC AUTO: 90.4 FL (ref 79–97)
MONOCYTES # BLD: 0.37 10*3/MM3 (ref 0.1–0.9)
NEUTROPHILS # BLD AUTO: 3.93 10*3/MM3 (ref 1.7–7)
NEUTROPHILS NFR BLD MANUAL: 64 % (ref 42.7–76)
NITRITE UR QL STRIP: NEGATIVE
PH UR STRIP.AUTO: 7.5 [PH] (ref 5.5–8)
PLATELET # BLD AUTO: 206 10*3/MM3 (ref 140–450)
PMV BLD AUTO: 10.3 FL (ref 6–12)
POTASSIUM SERPL-SCNC: 4.2 MMOL/L (ref 3.4–5)
PROT SERPL-MCNC: 7.4 G/DL (ref 6.3–8.6)
PROT UR QL STRIP: NEGATIVE
RBC # BLD AUTO: 4.91 10*6/MM3 (ref 4.14–5.8)
RBC MORPH BLD: NORMAL
SMALL PLATELETS BLD QL SMEAR: ADEQUATE
SODIUM SERPL-SCNC: 137 MMOL/L (ref 137–145)
SP GR UR STRIP: 1.02 (ref 1–1.03)
TRIGL SERPL-MCNC: 124 MG/DL
UROBILINOGEN UR QL STRIP: NORMAL
VARIANT LYMPHS NFR BLD MANUAL: 27 % (ref 19.6–45.3)
VLDLC SERPL-MCNC: 22 MG/DL (ref 5–40)
WBC MORPH BLD: NORMAL
WBC NRBC COR # BLD: 6.14 10*3/MM3 (ref 3.4–10.8)

## 2023-06-13 PROCEDURE — 83036 HEMOGLOBIN GLYCOSYLATED A1C: CPT | Performed by: NURSE PRACTITIONER

## 2023-06-13 PROCEDURE — 82043 UR ALBUMIN QUANTITATIVE: CPT | Performed by: NURSE PRACTITIONER

## 2023-06-13 PROCEDURE — G0103 PSA SCREENING: HCPCS | Performed by: NURSE PRACTITIONER

## 2023-06-13 PROCEDURE — 80061 LIPID PANEL: CPT | Performed by: NURSE PRACTITIONER

## 2023-06-13 PROCEDURE — 85025 COMPLETE CBC W/AUTO DIFF WBC: CPT | Performed by: NURSE PRACTITIONER

## 2023-06-13 PROCEDURE — 84439 ASSAY OF FREE THYROXINE: CPT | Performed by: NURSE PRACTITIONER

## 2023-06-13 PROCEDURE — 81003 URINALYSIS AUTO W/O SCOPE: CPT | Performed by: NURSE PRACTITIONER

## 2023-06-13 PROCEDURE — 80053 COMPREHEN METABOLIC PANEL: CPT | Performed by: NURSE PRACTITIONER

## 2023-06-13 PROCEDURE — 36415 COLL VENOUS BLD VENIPUNCTURE: CPT | Performed by: NURSE PRACTITIONER

## 2023-06-13 PROCEDURE — 82570 ASSAY OF URINE CREATININE: CPT | Performed by: NURSE PRACTITIONER

## 2023-06-13 PROCEDURE — 84443 ASSAY THYROID STIM HORMONE: CPT | Performed by: NURSE PRACTITIONER

## 2023-06-14 DIAGNOSIS — I10 ESSENTIAL HYPERTENSION: Primary | ICD-10-CM

## 2023-06-14 DIAGNOSIS — I10 ESSENTIAL HYPERTENSION: ICD-10-CM

## 2023-06-14 LAB
ALBUMIN UR-MCNC: <1.2 MG/DL
CREAT UR-MCNC: 162 MG/DL
HBA1C MFR BLD: 5.2 % (ref 4.8–5.6)
MICROALBUMIN/CREAT UR: NORMAL MG/G{CREAT}
PSA SERPL-MCNC: 0.33 NG/ML (ref 0–4)
T4 FREE SERPL-MCNC: 1.39 NG/DL (ref 0.93–1.7)
TSH SERPL DL<=0.05 MIU/L-ACNC: 1.73 UIU/ML (ref 0.27–4.2)

## 2023-06-14 RX ORDER — METOPROLOL SUCCINATE 50 MG/1
75 TABLET, EXTENDED RELEASE ORAL DAILY
Qty: 180 TABLET | Refills: 2 | Status: SHIPPED | OUTPATIENT
Start: 2023-06-14

## 2023-06-14 RX ORDER — METOPROLOL SUCCINATE 50 MG/1
75 TABLET, EXTENDED RELEASE ORAL DAILY
Qty: 135 TABLET | Refills: 3 | Status: SHIPPED | OUTPATIENT
Start: 2023-06-14 | End: 2023-06-14

## 2023-09-12 ENCOUNTER — OFFICE VISIT (OUTPATIENT)
Dept: CARDIOLOGY | Facility: CLINIC | Age: 63
End: 2023-09-12
Payer: MEDICARE

## 2023-09-12 VITALS
SYSTOLIC BLOOD PRESSURE: 146 MMHG | OXYGEN SATURATION: 99 % | HEART RATE: 64 BPM | HEIGHT: 71 IN | BODY MASS INDEX: 43.48 KG/M2 | DIASTOLIC BLOOD PRESSURE: 92 MMHG | WEIGHT: 310.6 LBS

## 2023-09-12 DIAGNOSIS — I10 ESSENTIAL HYPERTENSION: Primary | ICD-10-CM

## 2023-09-12 RX ORDER — HYDROCHLOROTHIAZIDE 12.5 MG/1
12.5 CAPSULE, GELATIN COATED ORAL DAILY
Qty: 30 CAPSULE | Refills: 6 | Status: SHIPPED | OUTPATIENT
Start: 2023-09-12

## 2023-09-12 NOTE — PROGRESS NOTES
Essential hypertension      History of Present Illness    Mr. Cy Blair is a 63 year old male with medical history of palpitations, GERD, and hypertension.    In the past he has followed with Dr. Santiago.  He has done a CT to rule out renal artery stenosis due to resistant hypertension.  He is on metoprolol XL 50 mg daily and Edarbyclor 40-25 mg (Azilsartan-Chlorthalidone) for hypertension.  Wore Holter monitor in 2021 showing rare PACs and PVCs.  Atrial tachycardia with atypical winky block.  Times have improved with beta-blocker therapy.    He saw locum Dr. Archuleta 3/20/2023. Wanted off Edarbyclor due to cost. He wants to retire and this medication is too costly. Stopped Edarbyclor and started him on  Diovan/HCTZ.  Continued BB therapy for palpitations. Reordered echocardiogram and myocardial stress test that was not completed between visits.    3/30/23, he presents for follow up. New mediation is not controlling B/P and he is having worsening LLE. Needing to find medications that he can afforded after longterm.  Myocardial nuclear stress test results given today.  LVEF preserved at 70%.  No evidence of poor perfusion.  Normal ECG during stress.  Low risk study for CAD.    6/5/23, he presents for first visit after changing  Edarbyclor to olmesartan.  He is retired now and adjusting to this new life.  He presents with blood pressure log most pressures are normotensive except for 1 day he developed hypotension in the evening.  It was after he had been taking over-the-counter bowel prep and Benadryl the night before to help him sleep.  He may of vasovagal and because he experienced palpitations and heart racing after he had an episode of diarrhea.  First from IBS- C.  Not had any other episode.  But this is the same type of situation that led him to seek Dr. Santiago's expertise.    Still having some palpitations.  During episode of hypotension felt like his heart rate was taking off.  Took another metoprolol before  "realizing that his blood pressure had dropped.    Today,  he tells me that after starting new medication, having heart racing on olmesartan, ankle swelling up until 3 weeks ago. Every 1 1/2 week will have heart palpitations     Sunday feeling funny around 2 pm \"funny headed\" bright lights\" systolic 90's and diastolic 56-62    Hasn't taken any sleep medicine in 1 month. Still consumes energy drinks.    Fatigue and tired, takes 2-3 hours to get \"life\" into him. Last 3 days \"dead\" no energy    The 10-year ASCVD risk score (Michael BREAUX, et al., 2019) is: 17.6%    Values used to calculate the score:      Age: 63 years      Sex: Male      Is Non- : No      Diabetic: No      Tobacco smoker: No      Systolic Blood Pressure: 146 mmHg      Is BP treated: Yes      HDL Cholesterol: 41 mg/dL      Total Cholesterol: 160 mg/dL    Cardiac Risk Factors:  hypertension, Sedentary life style and Obesity    Past Medical History:   Diagnosis Date    Candidiasis of skin     Candidiasis of skin - has flareups during the summer    Essential (primary) hypertension      Past Surgical History:   Procedure Laterality Date    KIDNEY STONE SURGERY      NECK SURGERY      for herniated discs and spinal stenosis     Social History     Socioeconomic History    Marital status:    Tobacco Use    Smoking status: Never    Smokeless tobacco: Never   Vaping Use    Vaping Use: Never used   Substance and Sexual Activity    Alcohol use: No    Drug use: No    Sexual activity: Defer     Family History   Problem Relation Age of Onset    Heart attack Mother 81        POSSIBLE MI    Heart disease Father     Sudden death Other     Hypertension Brother     Dementia Maternal Grandmother     No Known Problems Maternal Grandfather     No Known Problems Paternal Grandmother     Clotting disorder Paternal Grandfather        ALLERGIES:  No Known Allergies      Review of Systems   Constitutional: Positive for malaise/fatigue. Negative for " diaphoresis and night sweats.   Eyes:         Vision changes during hypotension of episode   Cardiovascular:  Positive for irregular heartbeat and palpitations. Negative for chest pain, dyspnea on exertion, leg swelling, orthopnea and paroxysmal nocturnal dyspnea.   Respiratory:  Negative for shortness of breath and sleep disturbances due to breathing.    Endocrine: Negative for polydipsia, polyphagia and polyuria.   Musculoskeletal:  Negative for muscle cramps and myalgias.   Gastrointestinal:  Positive for constipation. Negative for abdominal pain and nausea.   Neurological:  Negative for dizziness, light-headedness and loss of balance.     Current Outpatient Medications   Medication Sig Dispense Refill    aspirin 81 MG chewable tablet Chew 1 tablet Daily.      Doxepin HCl 3 MG tablet Take 3 mg by mouth At Night As Needed (insomnia). 30 tablet 2    Lansoprazole (PREVACID PO) Take  by mouth.      metoprolol succinate XL (TOPROL-XL) 50 MG 24 hr tablet Take 1.5 tablets by mouth Daily. 180 tablet 2    multivitamin with minerals tablet tablet Take 1 tablet by mouth Daily.      olmesartan (Benicar) 20 MG tablet Take 1 tablet by mouth Daily. 30 tablet 5    Omega-3 Fatty Acids (FISH OIL BURP-LESS) 1200 MG capsule Take 1,000 mg by mouth Daily.      omeprazole (priLOSEC) 40 MG capsule Take 1 capsule by mouth once daily 30 capsule 0    Oretlp-GjDth-HmLytp-FA-Omega (MULTIVITAMIN/MINERALS PO) Med Name: multivitamin & minerals #11-folic acid oral      Semaglutide-Weight Management (semaglutide) 0.25 MG/0.5ML solution auto-injector Inject 0.25 mg under the skin into the appropriate area as directed 1 (One) Time Per Week. 1 mL 1    sennosides-docusate (PERICOLACE) 8.6-50 MG per tablet 1-2 tablets Daily.      tiZANidine (ZANAFLEX) 4 MG tablet TAKE 1 TABLET BY MOUTH TWICE DAILY AS NEEDED FOR MUSCLE SPASM 30 tablet 0    vitamin C (ASCORBIC ACID) 500 MG tablet Take 2 tablets by mouth Daily.      hydroCHLOROthiazide (MICROZIDE) 12.5  "MG capsule Take 1 capsule by mouth Daily. 30 capsule 6     No current facility-administered medications for this visit.       OBJECTIVE:    Physical Exam:   Vitals reviewed.   Constitutional:       Appearance: Healthy appearance. Well-developed, well-groomed and not in distress. Morbidly obese.   Eyes:      General: Lids are normal.         Right eye: No discharge.         Left eye: No discharge.      Conjunctiva/sclera: Conjunctivae normal.   HENT:      Head: Normocephalic and atraumatic.      Right Ear: External ear normal.      Left Ear: External ear normal.      Nose: Nose normal.    Mouth/Throat:      Lips: Pink.      Mouth: Mucous membranes are moist.   Neck:      Vascular: No JVD.      Trachea: Trachea normal.   Pulmonary:      Effort: Pulmonary effort is normal.      Breath sounds: Normal breath sounds and air entry.   Cardiovascular:      Normal rate. Regular rhythm. Normal S1. Normal S2.       Murmurs: There is no murmur.      No gallop.    Edema:     Peripheral edema absent.   Skin:     General: Skin is warm and dry.      Capillary Refill: Capillary refill takes less than 2 seconds.      Coloration: Skin is not pale.   Neurological:      Mental Status: Alert, oriented to person, place, and time and oriented to person, place and time.      Gait: Gait is intact.   Psychiatric:         Attention and Perception: Attention normal.         Mood and Affect: Mood normal.         Speech: Speech normal.         Thought Content: Thought content normal.     Vitals:    09/12/23 1351   BP: 146/92   BP Location: Left arm   Patient Position: Sitting   Cuff Size: Adult   Pulse: 64   SpO2: 99%   Weight: (!) 141 kg (310 lb 9.6 oz)   Height: 180.3 cm (70.98\")       DATA REVIEWED:   Results for orders placed in visit on 03/06/20    Adult Transthoracic Echo Complete W/ Cont if Necessary Per Protocol    Interpretation Summary  · Mild tricuspid valve regurgitation is present.  · Normal EF and wall motion.      No radiology " results for the last 30 days.  CXR:     CT:     Labs: BMP, CBC, LIPID, TSH  Lab Results   Component Value Date    GLUCOSE 101 (H) 06/13/2023    CALCIUM 9.3 06/13/2023     06/13/2023    K 4.2 06/13/2023    CO2 31.0 (H) 06/13/2023    CL 99 (L) 06/13/2023    BUN 17 06/13/2023    CREATININE 1.16 06/13/2023    EGFRIFNONA 55 06/15/2021    BCR 14.7 06/13/2023    ANIONGAP 7.0 06/13/2023     Lab Results   Component Value Date    WBC 6.14 06/13/2023    HGB 15.7 06/13/2023    HCT 44.4 06/13/2023    MCV 90.4 06/13/2023     06/13/2023     Lab Results   Component Value Date    CHOL 160 06/13/2023    CHOL 150 12/20/2019    CHOL 138 (L) 06/28/2018     Lab Results   Component Value Date    TRIG 124 06/13/2023    TRIG 97 12/20/2019    TRIG 110 06/28/2018     Lab Results   Component Value Date    HDL 41 06/13/2023    HDL 42 12/20/2019    HDL 39 (L) 06/28/2018     No components found for: LDLCALC  Lab Results   Component Value Date    LDL 97 06/13/2023    LDL 89 12/20/2019    LDL 77 06/28/2018     No results found for: HDLLDLRATIO  No components found for: CHOLHDL  Lab Results   Component Value Date    TSH 1.730 06/13/2023     No results found for: PROBNP  EKG:     TTE: 6/10/2020  Interetation Summary    Mild tricuspid valve regurgitation is present.  Normal EF and wall motion.    Stress tests:  6/20/2020  Stress Procedure    Rest ECG Baseline ECG of normal sinus rhythm noted.   There was no ST segment deviation noted.   Stress Description A stress test was performed following the Rigoberto protocol.   The patient reached the end of the protocol.   The patient reported no symptoms during the stress test.   Blood pressure demonstrated a hypertensive response to stress.   Stress ECG Stress ECG of normal sinus rhythm noted. Non-specific ST-T wave changes noted.   Stress Findings No ECG evidence of myocardial ischemia.Negative clinical evidence of myocardial ischemia. Findings consistent with a normal ECG stress test.     Holter  2021   Interpretation Summary    A relatively benign monitor study.  Rare Pac's and PVC's and Atrial tachycardia with atypical wenkebach.    The following portions of the patient's history were reviewed and updated as appropriate: allergies, current medications, past family history, past medical history, past social history, past surgical history and problem list.  Old records reviewed and pertinent information is included in the above objective data.     ASSESSMENT/PLAN:     Diagnosis Plan   1. Essential hypertension  US renal artery complete    hydroCHLOROthiazide (MICROZIDE) 12.5 MG capsule        1..  Hypertension, essential.  He is a former Dr. Santiago patient.  He put him on a metoprolol XL for palpitations and blood pressure.  He also put him on a combination medication called Edarbyclor (ARB/thiazide diuretic).  He is concerned about the cost of medication and wants to try a new medication    Nuclear stress test low risk for ischemic heart disease.  LVEF preserved at 70%.  ECG during stress without evidence of ischemia.    Intolerant of amlodipine due to side effects  Stop Edarbyclor due to cost    He is retired now.  Having transient lower blood pressures in the 90s and does not feel well.  Sees bright lights and head feels funny.  Having fluctuations in blood pressure.    Continue current antihypertensive medication with olmesartan 20 mg daily   Continue Toprol XL XL to 75 mg daily.  Decrease HCTZ  to 12.5 mg daily    Renal ultrasound to assess renal artery stenosis and kidney disease. Liable blood pressures    I spent 31 minutes caring for Cy on this date of service. This time includes time spent by me in the following activities: preparing for the visit, reviewing tests, obtaining and/or reviewing a separately obtained history, performing a medically appropriate examination and/or evaluation, counseling and educating the patient/family/caregiver, ordering medications, tests, or procedures, documenting  information in the medical record and care coordination  Return in about 3 months (around 12/12/2023) for Recheck.      Seen on September 12 at 2 PM  This document has been electronically signed by MARTINA Smith on September 30, 2023 19:05 CDT   Electronically signed by MARTINA Smith, 09/30/23, 7:05 PM CDT.

## 2024-03-25 NOTE — PROGRESS NOTES
Cy Blair  1960  57 y.o. male       Patient presents today for 2 week recheck- right great toe.      Chief Complaint   Patient presents with   • Right Foot - Follow-up           History of Present Illness    Cy Blair is a 57 y.o. male who presents for Recheck of right hallux partial permanent nail avulsion.  At last visit we did perform a matrixectomy to the medial border of his right hallux.  He states since that visit the lateral border of the same nail has begun having the same problem.  Otherwise she is doing well.    Past Medical History:   Diagnosis Date   • Candidiasis of skin     Candidiasis of skin - has flareups during the summer   • Essential (primary) hypertension          Past Surgical History:   Procedure Laterality Date   • KIDNEY STONE SURGERY     • NECK SURGERY      for herniated discs and spinal stenosis         Family History   Problem Relation Age of Onset   • Heart attack Mother 81        POSSIBLE MI   • Heart disease Father    • Sudden death Other    • Hypertension Brother    • Dementia Maternal Grandmother    • No Known Problems Maternal Grandfather    • No Known Problems Paternal Grandmother    • Clotting disorder Paternal Grandfather          Social History     Social History   • Marital status: Unknown     Spouse name: N/A   • Number of children: N/A   • Years of education: N/A     Occupational History   • Not on file.     Social History Main Topics   • Smoking status: Never Smoker   • Smokeless tobacco: Never Used   • Alcohol use No   • Drug use: No   • Sexual activity: Defer     Other Topics Concern   • Not on file     Social History Narrative   • No narrative on file         Current Outpatient Prescriptions   Medication Sig Dispense Refill   • aspirin 81 MG chewable tablet Chew 81 mg Daily.     • diphenhydrAMINE (BENADRYL) 25 mg capsule Take 25 mg by mouth Daily As Needed.     • fluticasone (FLONASE) 50 MCG/ACT nasal spray 2 sprays into each nostril Daily.     • lisinopril  Yes. I do.   "(PRINIVIL,ZESTRIL) 10 MG tablet Take one-half tablet by mouth daily. 30 tablet 2   • losartan-hydrochlorothiazide (HYZAAR) 100-12.5 MG per tablet Take 1 tablet by mouth Daily. 90 tablet 1   • Omega-3 Fatty Acids (FISH OIL BURP-LESS) 1200 MG capsule Take  by mouth.     • omeprazole (priLOSEC) 20 MG capsule Take 20 mg by mouth Daily.     • Cxsguc-FhVgj-KeHfap-FA-Omega (MULTIVITAMIN/MINERALS PO) Med Name: multivitamin & minerals #11-folic acid oral     • senna-docusate (SENOKOT S) 8.6-50 MG per tablet 1-2 tablets Daily.       No current facility-administered medications for this visit.          OBJECTIVE    Ht 182.9 cm (72\")   Wt 126 kg (278 lb)   BMI 37.70 kg/m²       Review of Systems   Constitutional:  Denies recent weight loss, weight gain, fever or chills, no change in exercise tolerance  Musculoskeletal: Toe/foot pain.   Skin: No wounds or lesions  Neurological: Denies paresthesias.  Psychiatric/Behavioral: Denies depression        Physical Exam   Constitutional: he appears well-developed and well-nourished.   CV: No chest pain. Normal RR  Resp: Non labored respirations  Psychiatric: he has a normal mood and affect. her behavior is normal.      Lower Extremity Exam:  Vascular: DP/PT pulses palpable 2+.   Right hallux edema  Toes warm  Neuro: Protective sensation intact, b/l.  DTRs intact  Integument: No open wounds.  Ingrown lateral nail border, right hallux. Mild erythema, edema. +tenderness to palpation.  Web spaces c/d/i  No skin lesions  Musculoskeletal: LE muscle strength 5/5.   Gait normal  Ankle ROM full without pain or crepitus  STJ ROM full without pain or crepitus  No digital deformities        Nail Removal  Date/Time: 5/17/2018 2:48 PM  Performed by: TERRIE ELLER  Authorized by: TERRIE ELLER   Consent: Written consent obtained.  Risks and benefits: risks, benefits and alternatives were discussed  Consent given by: patient  Location: right foot  Location details: right big toe  Anesthesia: " digital block    Anesthesia:  Local Anesthetic: lidocaine 2% without epinephrine  Anesthetic total: 3 mL  Preparation: skin prepped with Betadine  Amount removed: partial  Side: lateral  Wedge excision of skin of nail fold: no  Nail bed sutured: no  Nail matrix removed: partial  Removed nail replaced and anchored: no  Dressing: 4x4, antibiotic ointment and gauze roll  Patient tolerance: Patient tolerated the procedure well with no immediate complications  Comments: Matrixectomy with 10% NaOH. Neutralized with acetic acid.                  ASSESSMENT AND PLAN    Cy was seen today for follow-up.    Diagnoses and all orders for this visit:    Cellulitis of toe, right    Ingrown right big toenail      -Comprehensive foot and ankle exam performed  -Diagnosis, prevention, and treatment of ingrown toe nails discussed with patient, including risks and potential benefits of nail avulsion both temporary and permanent versus simple debridement.  -Pt elected for partial permanent avulsion of right hallux  -Aftercare instructions for soapy ramos soaks BID  -Recheck 2 weeks            This document has been electronically signed by Griselda Gutiérrez MA on May 17, 2018 9:28 AM     EMR Dragon/Transcription disclaimer:   Much of this encounter note is an electronic transcription/translation of spoken language to printed text. The electronic translation of spoken language may permit erroneous, or at times, nonsensical words or phrases to be inadvertently transcribed; Although I have reviewed the note for such errors, some may still exist.    Griselda Gutiérrez MA  5/17/2018  9:28 AM